# Patient Record
Sex: MALE | Race: WHITE | NOT HISPANIC OR LATINO | URBAN - METROPOLITAN AREA
[De-identification: names, ages, dates, MRNs, and addresses within clinical notes are randomized per-mention and may not be internally consistent; named-entity substitution may affect disease eponyms.]

---

## 2017-04-07 ENCOUNTER — OUTPATIENT (OUTPATIENT)
Dept: OUTPATIENT SERVICES | Facility: HOSPITAL | Age: 77
LOS: 1 days | Discharge: HOME | End: 2017-04-07

## 2017-06-27 DIAGNOSIS — R79.9 ABNORMAL FINDING OF BLOOD CHEMISTRY, UNSPECIFIED: ICD-10-CM

## 2017-07-31 ENCOUNTER — OUTPATIENT (OUTPATIENT)
Dept: OUTPATIENT SERVICES | Facility: HOSPITAL | Age: 77
LOS: 1 days | Discharge: HOME | End: 2017-07-31

## 2017-07-31 DIAGNOSIS — G81.10 SPASTIC HEMIPLEGIA AFFECTING UNSPECIFIED SIDE: ICD-10-CM

## 2017-07-31 DIAGNOSIS — I69.320 APHASIA FOLLOWING CEREBRAL INFARCTION: ICD-10-CM

## 2017-07-31 DIAGNOSIS — I69.390 APRAXIA FOLLOWING CEREBRAL INFARCTION: ICD-10-CM

## 2017-08-02 ENCOUNTER — INPATIENT (INPATIENT)
Facility: HOSPITAL | Age: 77
LOS: 7 days | Discharge: NURSING, REHAB OR LONG-TERM CARE | End: 2017-08-10
Attending: HOSPITALIST | Admitting: FAMILY MEDICINE

## 2017-08-02 DIAGNOSIS — J18.9 PNEUMONIA, UNSPECIFIED ORGANISM: ICD-10-CM

## 2017-08-10 PROBLEM — Z00.00 ENCOUNTER FOR PREVENTIVE HEALTH EXAMINATION: Status: ACTIVE | Noted: 2017-08-10

## 2017-08-15 DIAGNOSIS — J69.0 PNEUMONITIS DUE TO INHALATION OF FOOD AND VOMIT: ICD-10-CM

## 2017-08-15 DIAGNOSIS — E78.5 HYPERLIPIDEMIA, UNSPECIFIED: ICD-10-CM

## 2017-08-15 DIAGNOSIS — R06.02 SHORTNESS OF BREATH: ICD-10-CM

## 2017-08-15 DIAGNOSIS — N40.0 BENIGN PROSTATIC HYPERPLASIA WITHOUT LOWER URINARY TRACT SYMPTOMS: ICD-10-CM

## 2017-08-15 DIAGNOSIS — I69.320 APHASIA FOLLOWING CEREBRAL INFARCTION: ICD-10-CM

## 2017-08-15 DIAGNOSIS — I12.9 HYPERTENSIVE CHRONIC KIDNEY DISEASE WITH STAGE 1 THROUGH STAGE 4 CHRONIC KIDNEY DISEASE, OR UNSPECIFIED CHRONIC KIDNEY DISEASE: ICD-10-CM

## 2017-08-15 DIAGNOSIS — E78.00 PURE HYPERCHOLESTEROLEMIA, UNSPECIFIED: ICD-10-CM

## 2017-08-15 DIAGNOSIS — I70.223 ATHEROSCLEROSIS OF NATIVE ARTERIES OF EXTREMITIES WITH REST PAIN, BILATERAL LEGS: ICD-10-CM

## 2017-08-15 DIAGNOSIS — R31.9 HEMATURIA, UNSPECIFIED: ICD-10-CM

## 2017-08-15 DIAGNOSIS — I69.351 HEMIPLEGIA AND HEMIPARESIS FOLLOWING CEREBRAL INFARCTION AFFECTING RIGHT DOMINANT SIDE: ICD-10-CM

## 2017-08-15 DIAGNOSIS — N18.2 CHRONIC KIDNEY DISEASE, STAGE 2 (MILD): ICD-10-CM

## 2017-08-15 DIAGNOSIS — Z87.891 PERSONAL HISTORY OF NICOTINE DEPENDENCE: ICD-10-CM

## 2017-08-15 DIAGNOSIS — M54.89 OTHER DORSALGIA: ICD-10-CM

## 2017-08-15 DIAGNOSIS — N17.9 ACUTE KIDNEY FAILURE, UNSPECIFIED: ICD-10-CM

## 2017-08-16 DIAGNOSIS — Z78.1 PHYSICAL RESTRAINT STATUS: ICD-10-CM

## 2017-08-22 ENCOUNTER — OUTPATIENT (OUTPATIENT)
Dept: OUTPATIENT SERVICES | Facility: HOSPITAL | Age: 77
LOS: 1 days | Discharge: HOME | End: 2017-08-22

## 2017-08-22 DIAGNOSIS — I10 ESSENTIAL (PRIMARY) HYPERTENSION: ICD-10-CM

## 2017-08-22 DIAGNOSIS — J18.9 PNEUMONIA, UNSPECIFIED ORGANISM: ICD-10-CM

## 2017-08-23 ENCOUNTER — APPOINTMENT (OUTPATIENT)
Dept: VASCULAR SURGERY | Facility: CLINIC | Age: 77
End: 2017-08-23

## 2017-12-10 ENCOUNTER — EMERGENCY (EMERGENCY)
Facility: HOSPITAL | Age: 77
LOS: 0 days | Discharge: HOME | End: 2017-12-10

## 2017-12-10 DIAGNOSIS — R21 RASH AND OTHER NONSPECIFIC SKIN ERUPTION: ICD-10-CM

## 2017-12-10 DIAGNOSIS — Z79.01 LONG TERM (CURRENT) USE OF ANTICOAGULANTS: ICD-10-CM

## 2017-12-10 DIAGNOSIS — I10 ESSENTIAL (PRIMARY) HYPERTENSION: ICD-10-CM

## 2017-12-10 DIAGNOSIS — Z79.82 LONG TERM (CURRENT) USE OF ASPIRIN: ICD-10-CM

## 2017-12-10 DIAGNOSIS — J18.9 PNEUMONIA, UNSPECIFIED ORGANISM: ICD-10-CM

## 2017-12-10 DIAGNOSIS — L22 DIAPER DERMATITIS: ICD-10-CM

## 2017-12-10 DIAGNOSIS — E78.00 PURE HYPERCHOLESTEROLEMIA, UNSPECIFIED: ICD-10-CM

## 2017-12-10 DIAGNOSIS — Z88.0 ALLERGY STATUS TO PENICILLIN: ICD-10-CM

## 2017-12-10 DIAGNOSIS — E78.5 HYPERLIPIDEMIA, UNSPECIFIED: ICD-10-CM

## 2017-12-10 DIAGNOSIS — Z79.899 OTHER LONG TERM (CURRENT) DRUG THERAPY: ICD-10-CM

## 2018-01-03 ENCOUNTER — OUTPATIENT (OUTPATIENT)
Dept: OUTPATIENT SERVICES | Facility: HOSPITAL | Age: 78
LOS: 1 days | Discharge: HOME | End: 2018-01-03

## 2018-01-03 DIAGNOSIS — I69.020 APHASIA FOLLOWING NONTRAUMATIC SUBARACHNOID HEMORRHAGE: ICD-10-CM

## 2018-01-03 DIAGNOSIS — I69.320 APHASIA FOLLOWING CEREBRAL INFARCTION: ICD-10-CM

## 2018-01-03 DIAGNOSIS — G81.90 HEMIPLEGIA, UNSPECIFIED AFFECTING UNSPECIFIED SIDE: ICD-10-CM

## 2018-01-05 ENCOUNTER — INPATIENT (INPATIENT)
Facility: HOSPITAL | Age: 78
LOS: 6 days | Discharge: HOME HEALTH | End: 2018-01-12
Attending: HOSPITALIST | Admitting: FAMILY MEDICINE

## 2018-01-05 DIAGNOSIS — I10 ESSENTIAL (PRIMARY) HYPERTENSION: ICD-10-CM

## 2018-01-05 DIAGNOSIS — J18.9 PNEUMONIA, UNSPECIFIED ORGANISM: ICD-10-CM

## 2018-01-05 DIAGNOSIS — R21 RASH AND OTHER NONSPECIFIC SKIN ERUPTION: ICD-10-CM

## 2018-01-17 DIAGNOSIS — I50.33 ACUTE ON CHRONIC DIASTOLIC (CONGESTIVE) HEART FAILURE: ICD-10-CM

## 2018-01-17 DIAGNOSIS — R13.10 DYSPHAGIA, UNSPECIFIED: ICD-10-CM

## 2018-01-17 DIAGNOSIS — R21 RASH AND OTHER NONSPECIFIC SKIN ERUPTION: ICD-10-CM

## 2018-01-17 DIAGNOSIS — Z79.82 LONG TERM (CURRENT) USE OF ASPIRIN: ICD-10-CM

## 2018-01-17 DIAGNOSIS — I07.1 RHEUMATIC TRICUSPID INSUFFICIENCY: ICD-10-CM

## 2018-01-17 DIAGNOSIS — Z88.0 ALLERGY STATUS TO PENICILLIN: ICD-10-CM

## 2018-01-17 DIAGNOSIS — I11.0 HYPERTENSIVE HEART DISEASE WITH HEART FAILURE: ICD-10-CM

## 2018-01-17 DIAGNOSIS — Z79.01 LONG TERM (CURRENT) USE OF ANTICOAGULANTS: ICD-10-CM

## 2018-01-17 DIAGNOSIS — E86.0 DEHYDRATION: ICD-10-CM

## 2018-01-17 DIAGNOSIS — M19.90 UNSPECIFIED OSTEOARTHRITIS, UNSPECIFIED SITE: ICD-10-CM

## 2018-01-17 DIAGNOSIS — N40.0 BENIGN PROSTATIC HYPERPLASIA WITHOUT LOWER URINARY TRACT SYMPTOMS: ICD-10-CM

## 2018-01-17 DIAGNOSIS — K59.00 CONSTIPATION, UNSPECIFIED: ICD-10-CM

## 2018-01-17 DIAGNOSIS — L40.9 PSORIASIS, UNSPECIFIED: ICD-10-CM

## 2018-01-17 DIAGNOSIS — E78.5 HYPERLIPIDEMIA, UNSPECIFIED: ICD-10-CM

## 2018-01-17 DIAGNOSIS — Z74.01 BED CONFINEMENT STATUS: ICD-10-CM

## 2018-01-17 DIAGNOSIS — I73.9 PERIPHERAL VASCULAR DISEASE, UNSPECIFIED: ICD-10-CM

## 2018-01-17 DIAGNOSIS — R00.0 TACHYCARDIA, UNSPECIFIED: ICD-10-CM

## 2018-01-17 DIAGNOSIS — D72.829 ELEVATED WHITE BLOOD CELL COUNT, UNSPECIFIED: ICD-10-CM

## 2018-01-17 DIAGNOSIS — Z87.891 PERSONAL HISTORY OF NICOTINE DEPENDENCE: ICD-10-CM

## 2018-01-17 DIAGNOSIS — J96.01 ACUTE RESPIRATORY FAILURE WITH HYPOXIA: ICD-10-CM

## 2018-01-17 DIAGNOSIS — I69.351 HEMIPLEGIA AND HEMIPARESIS FOLLOWING CEREBRAL INFARCTION AFFECTING RIGHT DOMINANT SIDE: ICD-10-CM

## 2018-01-17 DIAGNOSIS — R50.9 FEVER, UNSPECIFIED: ICD-10-CM

## 2018-01-17 DIAGNOSIS — J81.1 CHRONIC PULMONARY EDEMA: ICD-10-CM

## 2018-01-31 ENCOUNTER — OUTPATIENT (OUTPATIENT)
Dept: OUTPATIENT SERVICES | Facility: HOSPITAL | Age: 78
LOS: 1 days | Discharge: HOME | End: 2018-01-31

## 2018-01-31 DIAGNOSIS — G81.90 HEMIPLEGIA, UNSPECIFIED AFFECTING UNSPECIFIED SIDE: ICD-10-CM

## 2018-01-31 DIAGNOSIS — I69.320 APHASIA FOLLOWING CEREBRAL INFARCTION: ICD-10-CM

## 2018-01-31 DIAGNOSIS — I69.020 APHASIA FOLLOWING NONTRAUMATIC SUBARACHNOID HEMORRHAGE: ICD-10-CM

## 2018-02-04 DIAGNOSIS — J18.9 PNEUMONIA, UNSPECIFIED ORGANISM: ICD-10-CM

## 2018-02-04 DIAGNOSIS — I10 ESSENTIAL (PRIMARY) HYPERTENSION: ICD-10-CM

## 2018-02-04 DIAGNOSIS — R21 RASH AND OTHER NONSPECIFIC SKIN ERUPTION: ICD-10-CM

## 2018-03-31 ENCOUNTER — INPATIENT (INPATIENT)
Facility: HOSPITAL | Age: 78
LOS: 4 days | Discharge: HOME | End: 2018-04-05
Attending: INTERNAL MEDICINE

## 2018-03-31 VITALS
HEIGHT: 66 IN | RESPIRATION RATE: 20 BRPM | DIASTOLIC BLOOD PRESSURE: 100 MMHG | SYSTOLIC BLOOD PRESSURE: 180 MMHG | HEART RATE: 90 BPM | WEIGHT: 169.98 LBS

## 2018-03-31 DIAGNOSIS — E78.00 PURE HYPERCHOLESTEROLEMIA, UNSPECIFIED: ICD-10-CM

## 2018-03-31 DIAGNOSIS — I10 ESSENTIAL (PRIMARY) HYPERTENSION: ICD-10-CM

## 2018-03-31 DIAGNOSIS — R56.9 UNSPECIFIED CONVULSIONS: ICD-10-CM

## 2018-03-31 DIAGNOSIS — N40.0 BENIGN PROSTATIC HYPERPLASIA WITHOUT LOWER URINARY TRACT SYMPTOMS: ICD-10-CM

## 2018-03-31 LAB
ALBUMIN SERPL ELPH-MCNC: 4.1 G/DL — SIGNIFICANT CHANGE UP (ref 3.5–5.2)
ALP SERPL-CCNC: 85 U/L — SIGNIFICANT CHANGE UP (ref 30–115)
ALT FLD-CCNC: 30 U/L — SIGNIFICANT CHANGE UP (ref 0–41)
ANION GAP SERPL CALC-SCNC: 19 MMOL/L — HIGH (ref 7–14)
APTT BLD: 31.4 SEC — SIGNIFICANT CHANGE UP (ref 27–39.2)
AST SERPL-CCNC: 13 U/L — SIGNIFICANT CHANGE UP (ref 0–41)
BASE EXCESS BLDV CALC-SCNC: 0.4 MMOL/L — SIGNIFICANT CHANGE UP (ref -2–2)
BASOPHILS # BLD AUTO: 0.02 K/UL — SIGNIFICANT CHANGE UP (ref 0–0.2)
BASOPHILS NFR BLD AUTO: 0.1 % — SIGNIFICANT CHANGE UP (ref 0–1)
BILIRUB SERPL-MCNC: 0.7 MG/DL — SIGNIFICANT CHANGE UP (ref 0.2–1.2)
BUN SERPL-MCNC: 39 MG/DL — HIGH (ref 10–20)
CA-I SERPL-SCNC: 1.24 MMOL/L — SIGNIFICANT CHANGE UP (ref 1.12–1.3)
CALCIUM SERPL-MCNC: 9.7 MG/DL — SIGNIFICANT CHANGE UP (ref 8.5–10.1)
CHLORIDE SERPL-SCNC: 99 MMOL/L — SIGNIFICANT CHANGE UP (ref 98–110)
CHOLEST SERPL-MCNC: 160 MG/DL — SIGNIFICANT CHANGE UP (ref 100–200)
CK MB BLD-MCNC: 6 % — HIGH (ref 0–4)
CK MB CFR SERPL CALC: 2.1 NG/ML — SIGNIFICANT CHANGE UP (ref 0.6–6.3)
CK SERPL-CCNC: 38 U/L — SIGNIFICANT CHANGE UP (ref 0–225)
CO2 SERPL-SCNC: 22 MMOL/L — SIGNIFICANT CHANGE UP (ref 17–32)
CREAT SERPL-MCNC: 1.3 MG/DL — SIGNIFICANT CHANGE UP (ref 0.7–1.5)
EOSINOPHIL # BLD AUTO: 0.08 K/UL — SIGNIFICANT CHANGE UP (ref 0–0.7)
EOSINOPHIL NFR BLD AUTO: 0.6 % — SIGNIFICANT CHANGE UP (ref 0–8)
GAS PNL BLDV: 136 MMOL/L — SIGNIFICANT CHANGE UP (ref 136–145)
GAS PNL BLDV: SIGNIFICANT CHANGE UP
GLUCOSE SERPL-MCNC: 144 MG/DL — HIGH (ref 70–99)
HCO3 BLDV-SCNC: 27 MMOL/L — SIGNIFICANT CHANGE UP (ref 22–29)
HCT VFR BLD CALC: 40.7 % — LOW (ref 42–52)
HCT VFR BLD CALC: 45.5 % — SIGNIFICANT CHANGE UP (ref 42–52)
HCT VFR BLDA CALC: 51.9 % — HIGH (ref 34–44)
HGB BLD CALC-MCNC: 16.9 G/DL — SIGNIFICANT CHANGE UP (ref 14–18)
HGB BLD-MCNC: 14 G/DL — SIGNIFICANT CHANGE UP (ref 14–18)
HGB BLD-MCNC: 15.4 G/DL — SIGNIFICANT CHANGE UP (ref 14–18)
HOROWITZ INDEX BLDV+IHG-RTO: 21 — SIGNIFICANT CHANGE UP
IMM GRANULOCYTES NFR BLD AUTO: 1 % — HIGH (ref 0.1–0.3)
INR BLD: 1.03 RATIO — SIGNIFICANT CHANGE UP (ref 0.65–1.3)
LACTATE BLDV-MCNC: 3.5 MMOL/L — HIGH (ref 0.5–1.6)
LYMPHOCYTES # BLD AUTO: 1.61 K/UL — SIGNIFICANT CHANGE UP (ref 1.2–3.4)
LYMPHOCYTES # BLD AUTO: 12.1 % — LOW (ref 20.5–51.1)
MAGNESIUM SERPL-MCNC: 1.9 MG/DL — SIGNIFICANT CHANGE UP (ref 1.8–2.4)
MCHC RBC-ENTMCNC: 30.4 PG — SIGNIFICANT CHANGE UP (ref 27–31)
MCHC RBC-ENTMCNC: 30.6 PG — SIGNIFICANT CHANGE UP (ref 27–31)
MCHC RBC-ENTMCNC: 33.8 G/DL — SIGNIFICANT CHANGE UP (ref 32–37)
MCHC RBC-ENTMCNC: 34.4 G/DL — SIGNIFICANT CHANGE UP (ref 32–37)
MCV RBC AUTO: 88.9 FL — SIGNIFICANT CHANGE UP (ref 80–94)
MCV RBC AUTO: 89.7 FL — SIGNIFICANT CHANGE UP (ref 80–94)
MONOCYTES # BLD AUTO: 0.62 K/UL — HIGH (ref 0.1–0.6)
MONOCYTES NFR BLD AUTO: 4.6 % — SIGNIFICANT CHANGE UP (ref 1.7–9.3)
NEUTROPHILS # BLD AUTO: 10.9 K/UL — HIGH (ref 1.4–6.5)
NEUTROPHILS NFR BLD AUTO: 81.6 % — HIGH (ref 42.2–75.2)
NRBC # BLD: 0 /100 WBCS — SIGNIFICANT CHANGE UP (ref 0–0)
NRBC # BLD: 0 /100 WBCS — SIGNIFICANT CHANGE UP (ref 0–0)
PCO2 BLDV: 47 MMHG — SIGNIFICANT CHANGE UP (ref 41–51)
PH BLDV: 7.36 — SIGNIFICANT CHANGE UP (ref 7.26–7.43)
PLATELET # BLD AUTO: 310 K/UL — SIGNIFICANT CHANGE UP (ref 130–400)
PLATELET # BLD AUTO: 395 K/UL — SIGNIFICANT CHANGE UP (ref 130–400)
PO2 BLDV: 43 MMHG — HIGH (ref 20–40)
POTASSIUM BLDV-SCNC: 4.4 MMOL/L — SIGNIFICANT CHANGE UP (ref 3.3–5.6)
POTASSIUM SERPL-MCNC: 5.1 MMOL/L — HIGH (ref 3.5–5)
POTASSIUM SERPL-SCNC: 5.1 MMOL/L — HIGH (ref 3.5–5)
PROT SERPL-MCNC: 6.3 G/DL — SIGNIFICANT CHANGE UP (ref 6–8)
PROTHROM AB SERPL-ACNC: 11.1 SEC — SIGNIFICANT CHANGE UP (ref 9.95–12.87)
RBC # BLD: 4.58 M/UL — LOW (ref 4.7–6.1)
RBC # BLD: 5.07 M/UL — SIGNIFICANT CHANGE UP (ref 4.7–6.1)
RBC # FLD: 14.3 % — SIGNIFICANT CHANGE UP (ref 11.5–14.5)
RBC # FLD: 14.4 % — SIGNIFICANT CHANGE UP (ref 11.5–14.5)
SAO2 % BLDV: 77 % — SIGNIFICANT CHANGE UP
SODIUM SERPL-SCNC: 140 MMOL/L — SIGNIFICANT CHANGE UP (ref 135–146)
TROPONIN T SERPL-MCNC: <0.01 NG/ML — SIGNIFICANT CHANGE UP
WBC # BLD: 13.36 K/UL — HIGH (ref 4.8–10.8)
WBC # BLD: 16.93 K/UL — HIGH (ref 4.8–10.8)
WBC # FLD AUTO: 13.36 K/UL — HIGH (ref 4.8–10.8)
WBC # FLD AUTO: 16.93 K/UL — HIGH (ref 4.8–10.8)

## 2018-03-31 RX ORDER — LEVETIRACETAM 250 MG/1
500 TABLET, FILM COATED ORAL EVERY 12 HOURS
Qty: 0 | Refills: 0 | Status: DISCONTINUED | OUTPATIENT
Start: 2018-03-31 | End: 2018-04-02

## 2018-03-31 RX ORDER — CLOPIDOGREL BISULFATE 75 MG/1
75 TABLET, FILM COATED ORAL DAILY
Qty: 0 | Refills: 0 | Status: DISCONTINUED | OUTPATIENT
Start: 2018-03-31 | End: 2018-04-01

## 2018-03-31 RX ORDER — TRAMADOL HYDROCHLORIDE 50 MG/1
25 TABLET ORAL
Qty: 0 | Refills: 0 | Status: DISCONTINUED | OUTPATIENT
Start: 2018-03-31 | End: 2018-04-05

## 2018-03-31 RX ORDER — SODIUM CHLORIDE 9 MG/ML
1000 INJECTION INTRAMUSCULAR; INTRAVENOUS; SUBCUTANEOUS
Qty: 0 | Refills: 0 | Status: DISCONTINUED | OUTPATIENT
Start: 2018-03-31 | End: 2018-04-05

## 2018-03-31 RX ORDER — BACLOFEN 100 %
10 POWDER (GRAM) MISCELLANEOUS DAILY
Qty: 0 | Refills: 0 | Status: DISCONTINUED | OUTPATIENT
Start: 2018-03-31 | End: 2018-04-01

## 2018-03-31 RX ORDER — ATORVASTATIN CALCIUM 80 MG/1
40 TABLET, FILM COATED ORAL AT BEDTIME
Qty: 0 | Refills: 0 | Status: DISCONTINUED | OUTPATIENT
Start: 2018-03-31 | End: 2018-04-05

## 2018-03-31 RX ORDER — AMLODIPINE BESYLATE 2.5 MG/1
10 TABLET ORAL ONCE
Qty: 0 | Refills: 0 | Status: COMPLETED | OUTPATIENT
Start: 2018-03-31 | End: 2018-03-31

## 2018-03-31 RX ORDER — LOSARTAN POTASSIUM 100 MG/1
100 TABLET, FILM COATED ORAL ONCE
Qty: 0 | Refills: 0 | Status: COMPLETED | OUTPATIENT
Start: 2018-03-31 | End: 2018-03-31

## 2018-03-31 RX ORDER — HYDROCHLOROTHIAZIDE 25 MG
25 TABLET ORAL DAILY
Qty: 0 | Refills: 0 | Status: DISCONTINUED | OUTPATIENT
Start: 2018-03-31 | End: 2018-04-05

## 2018-03-31 RX ORDER — ASPIRIN/CALCIUM CARB/MAGNESIUM 324 MG
81 TABLET ORAL ONCE
Qty: 0 | Refills: 0 | Status: COMPLETED | OUTPATIENT
Start: 2018-03-31 | End: 2018-03-31

## 2018-03-31 RX ORDER — ENOXAPARIN SODIUM 100 MG/ML
40 INJECTION SUBCUTANEOUS AT BEDTIME
Qty: 0 | Refills: 0 | Status: DISCONTINUED | OUTPATIENT
Start: 2018-03-31 | End: 2018-04-01

## 2018-03-31 RX ORDER — FINASTERIDE 5 MG/1
5 TABLET, FILM COATED ORAL DAILY
Qty: 0 | Refills: 0 | Status: DISCONTINUED | OUTPATIENT
Start: 2018-03-31 | End: 2018-04-05

## 2018-03-31 RX ORDER — LEVETIRACETAM 250 MG/1
1000 TABLET, FILM COATED ORAL ONCE
Qty: 0 | Refills: 0 | Status: DISCONTINUED | OUTPATIENT
Start: 2018-03-31 | End: 2018-03-31

## 2018-03-31 RX ORDER — LOSARTAN POTASSIUM 100 MG/1
100 TABLET, FILM COATED ORAL DAILY
Qty: 0 | Refills: 0 | Status: DISCONTINUED | OUTPATIENT
Start: 2018-03-31 | End: 2018-04-05

## 2018-03-31 RX ORDER — HYDROCHLOROTHIAZIDE 25 MG
12.5 TABLET ORAL ONCE
Qty: 0 | Refills: 0 | Status: COMPLETED | OUTPATIENT
Start: 2018-03-31 | End: 2018-03-31

## 2018-03-31 RX ORDER — AMLODIPINE BESYLATE 2.5 MG/1
10 TABLET ORAL DAILY
Qty: 0 | Refills: 0 | Status: DISCONTINUED | OUTPATIENT
Start: 2018-03-31 | End: 2018-04-05

## 2018-03-31 RX ORDER — ASPIRIN/CALCIUM CARB/MAGNESIUM 324 MG
81 TABLET ORAL DAILY
Qty: 0 | Refills: 0 | Status: DISCONTINUED | OUTPATIENT
Start: 2018-03-31 | End: 2018-04-05

## 2018-03-31 RX ADMIN — CLOPIDOGREL BISULFATE 75 MILLIGRAM(S): 75 TABLET, FILM COATED ORAL at 08:41

## 2018-03-31 RX ADMIN — AMLODIPINE BESYLATE 10 MILLIGRAM(S): 2.5 TABLET ORAL at 10:33

## 2018-03-31 RX ADMIN — Medication 1 MILLIGRAM(S): at 07:11

## 2018-03-31 RX ADMIN — Medication 10 MILLIGRAM(S): at 21:20

## 2018-03-31 RX ADMIN — ATORVASTATIN CALCIUM 40 MILLIGRAM(S): 80 TABLET, FILM COATED ORAL at 21:20

## 2018-03-31 RX ADMIN — LOSARTAN POTASSIUM 100 MILLIGRAM(S): 100 TABLET, FILM COATED ORAL at 10:33

## 2018-03-31 RX ADMIN — SODIUM CHLORIDE 50 MILLILITER(S): 9 INJECTION INTRAMUSCULAR; INTRAVENOUS; SUBCUTANEOUS at 19:32

## 2018-03-31 RX ADMIN — Medication 81 MILLIGRAM(S): at 08:41

## 2018-03-31 RX ADMIN — TRAMADOL HYDROCHLORIDE 25 MILLIGRAM(S): 50 TABLET ORAL at 17:19

## 2018-03-31 RX ADMIN — Medication 12.5 MILLIGRAM(S): at 10:33

## 2018-03-31 RX ADMIN — Medication 120 MILLIGRAM(S): at 08:41

## 2018-03-31 NOTE — H&P ADULT - ASSESSMENT
78y male presented from home with constant tremors x 1 day releived  with ativan , history signfiicant for cva 	  eeg, neuro eval for additional studies     dnr / dni

## 2018-03-31 NOTE — H&P ADULT - PROBLEM SELECTOR PROBLEM 1
ACUTE RENAL FAILURE: due to urosepsis , uti continue with fluid and iv abx   Serum creatinine is increased    , approximating GFR is decreased    ml/min.   [There is  progression]. [No uremic symptoms]   [No evidence of anemia].  Fluid status stable.  Will continue to avoid nephrotoxic drugs.  Patient remains asymptomatic.   Continue current therapy.  hold   diuretic.  hold   ACE inhibitor.  hold   ARB. Seizure

## 2018-03-31 NOTE — STROKE CODE NOTE - NIH STROKE SCALE: 8. SENSORY, QM
(2) Severe to total sensory loss; patient is not aware of being touched in the face, arm, and leg (1) Mild-to-moderate sensory loss; patient feels pinprick is less sharp or is dull on the affected side; or there is a loss of superficial pain with pinprick, but patient is aware of being touched

## 2018-03-31 NOTE — STROKE CODE NOTE - NIH STROKE SCALE: 4. FACIAL PALSY, QM
(3) Complete paralysis of one or both sides (absence of facial movement in the upper and lower face) (1) Minor paralysis (flattened nasolabial fold, asymmetry on smiling)

## 2018-03-31 NOTE — ED PROVIDER NOTE - PROGRESS NOTE DETAILS
case dw Dr Mijares, pts prior hx precludes tpa or intervention at this time. pt to be admitted for further eval. CT pending. Pt present to the ED with Right sided facial droop and twitching. Stroke code was called. Hx of CNS bleed about one year ago. Pt has chronic right sided weakness. Care taker is wife. New symptoms are right facial weakness. This happen about 3-4 weeks ago per the daughter. CTscan is showing subacute cva changes. Of not pt has a right facial twitch and right body twitching. He is awake and answer question appropriately with a not. Smiles. Turns head. Pt is cooperative. Was given Ativan 1 mg earlier. List of meds, losartan 100 mg qd, asa 81 mg qd, baclofen 10 mg qd, Norvasc 10 mg qd, Finesteride 5 mg qd, Lipitor 40 mg qd, Plavix 75 mg qd, HCTZ 25 mg qd, prednisone in January. On exam S1S2 rrr, lungs clear, abdomen is soft nontender. Diaper.  Twitching may be seizures on the right side-partial as pt is awake, will give Keppra. Admission pending. Swallowing test >>if ok give daily meds. Wife states pt had keppra in the past and had a reaction. Will change to dilantin. Pt still with intermittent twitching. Pt is awake and alert and responds to commands. Blood pressure elevated. Will give his morning medication. Dilantin is infusing at this time. Spoke to Dr. Castañeda advised of admission>> stroke tele floor. Decrease in twitching. Pt is awake and alert. Will discuss with neuro.

## 2018-03-31 NOTE — ED PROVIDER NOTE - OBJECTIVE STATEMENT
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 As per wife pt was last seen without facial droop and tremors at 5am. No head injury or trauma. No fever or chills. No difficulty breathing.

## 2018-03-31 NOTE — CONSULT NOTE ADULT - SUBJECTIVE AND OBJECTIVE BOX
Neurology Consult    Patient is a 78y old  Male who presents with a chief complaint of Right side tremors (31 Mar 2018 14:42)    HPI:  79 yo M with pmhx CVA in 2/2018 with residual right hemiplegia and aphasia hemorrhagic stroke as per family presenting after family noted what looked like a right facial droop along with tremors to right upper extremity and right lower extremity. As per wife pt was last seen without facial droop and tremors at 5am. No head injury or trauma. No fever or chills. No difficulty breathing. Tremor was constant of right arm and leg , without relieving or aggravating factors, associted with right facial droop ,  	  baseline bedbound, and constant right sided 0/5  since cva (31 Mar 2018 12:56)    PAST MEDICAL & SURGICAL HISTORY:  Right sided weakness  High cholesterol  Enlarged prostate  HTN (hypertension)  Hemorrhagic stroke    FAMILY HISTORY:    Social History: (-) x 3    Allergies    penicillins (Unknown)    Intolerances    MEDICATIONS  (STANDING):  amLODIPine   Tablet 10 milliGRAM(s) Oral daily  aspirin  chewable 81 milliGRAM(s) Oral daily  atorvastatin 40 milliGRAM(s) Oral at bedtime  baclofen 10 milliGRAM(s) Oral daily  clopidogrel Tablet 75 milliGRAM(s) Oral daily  enoxaparin Injectable 40 milliGRAM(s) SubCutaneous at bedtime  finasteride 5 milliGRAM(s) Oral daily  hydrochlorothiazide 25 milliGRAM(s) Oral daily  losartan 100 milliGRAM(s) Oral daily    MEDICATIONS  (PRN):      Review of systems:    Constitutional: No fever, weight loss or fatigue    Eyes: No eye pain or discharge  ENMT:  No difficulty hearing; No sinus or throat pain  Neck: No pain or stiffness  Respiratory: No cough, wheezing, chills or hemoptysis  Cardiovascular: No chest pain, palpitations, shortness of breath, dyspnea on exertion  Gastrointestinal: No abdominal pain, nausea, vomiting or hematemesis; No diarrhea or constipation.   Genitourinary: No dysuria, frequency, hematuria or incontinence  Neurological: As per HPI  Skin: No rashes or lesions   Endocrine: No heat or cold intolerance; No hair loss  Musculoskeletal: No joint pain or swelling  Psychiatric: No depression, anxiety, mood swings  Heme/Lymph: No easy bruising or bleeding gums    Vital Signs Last 24 Hrs  T(C): 35.8 (31 Mar 2018 14:27), Max: 36.7 (31 Mar 2018 07:06)  T(F): 96.4 (31 Mar 2018 14:27), Max: 98.1 (31 Mar 2018 07:06)  HR: 74 (31 Mar 2018 14:27) (70 - 98)  BP: 137/55 (31 Mar 2018 14:27) (106/62 - 180/100)  BP(mean): --  RR: 16 (31 Mar 2018 14:27) (16 - 20)  SpO2: 96% (31 Mar 2018 10:48) (96% - 98%)    Neurologic Examination:  General:  Appearance is consistent with chronologic age.  No abnormal facies.   General: The patient is oriented to person, place, time and date.  Recent and remote memory intact.  Fund of knowledge is intact and normal.  Language with normal repetition, comprehension and naming.  Nondysarthric.    Cranial nerves: intact VA, VFF.  EOMI w/o nystagmus, skew or reported double vision.  PERRL.  No ptosis/weakness of eyelid closure.  Facial sensation is normal with normal bite.  No facial asymmetry.  Hearing grossly intact b/l.  Palate elevates midline.  Tongue midline.  Motor examination:   Normal tone, bulk and range of motion.  No tenderness, twitching, tremors or involuntary movements.  Formal Muscle Strength Testing: (MRC grade R/L) 5/5 UE; 5/5 LE.  No observable drift.  Reflexes:   2+ b/l pectoralis, biceps, triceps, brachioradialis, patella and Achilles.  Plantar response downgoing b/l.  Jaw jerk, Elizabeth, clonus absent.  Sensory examination:   Intact to light touch and pinprick, pain, temperature and proprioception and vibration in all extremities.  Cerebellum:   FTN/HKS intact with normal VIKAS in all limbs.  No dysmetria or dysdiadokinesia.  Gait narrow based and normal.    Labs:   CBC Full  -  ( 31 Mar 2018 07:10 )  WBC Count : 13.36 K/uL  Hemoglobin : 15.4 g/dL  Hematocrit : 45.5 %  Platelet Count - Automated : 395 K/uL  Mean Cell Volume : 89.7 fL  Mean Cell Hemoglobin : 30.4 pg  Mean Cell Hemoglobin Concentration : 33.8 g/dL  Auto Neutrophil # : 10.90 K/uL  Auto Lymphocyte # : 1.61 K/uL  Auto Monocyte # : 0.62 K/uL  Auto Eosinophil # : 0.08 K/uL  Auto Basophil # : 0.02 K/uL  Auto Neutrophil % : 81.6 %  Auto Lymphocyte % : 12.1 %  Auto Monocyte % : 4.6 %  Auto Eosinophil % : 0.6 %  Auto Basophil % : 0.1 %    03-31    140  |  99  |  39<H>  ----------------------------<  144<H>  5.1<H>   |  22  |  1.3    Ca    9.7      31 Mar 2018 07:10  Mg     1.9     03-31    TPro  6.3  /  Alb  4.1  /  TBili  0.7  /  DBili  x   /  AST  13  /  ALT  30  /  AlkPhos  85  03-31    LIVER FUNCTIONS - ( 31 Mar 2018 07:10 )  Alb: 4.1 g/dL / Pro: 6.3 g/dL / ALK PHOS: 85 U/L / ALT: 30 U/L / AST: 13 U/L / GGT: x           PT/INR - ( 31 Mar 2018 07:10 )   PT: 11.10 sec;   INR: 1.03 ratio         PTT - ( 31 Mar 2018 07:10 )  PTT:31.4 sec    Neuroimaging:  EXAM:  CT BRAIN        PROCEDURE DATE:  03/31/2018    INTERPRETATION:  CLINICAL HISTORY/REASON FOR EXAM: Stroke code.    TECHNIQUE: Contiguous axial CT images of the head were obtained from the   base of the skull to the vertex without IV stroke contrast.    COMPARISON: 2/9/2017. MRI brain 2/6/2017    FINDINGS: Study limited by motion artifact.     Compared to the study from one year prior, there is notable progression   of cerebral atrophy, particularly of the left cerebral convexity, with   extensive white matter tract disease consistent with the widespread   multifocal infarcts seenin February 2017.    There are small age-indeterminate strokes of the anterior and posterior   left frontal region, with no large territorial infarct.    Stable left occipital periventricular infarct.    No acute intracranial hemorrhage or midline shift or extra-axial fluid   collection    There is exvacuodilatation of the bilateral lateral ventricles.    Bones, paranasal sinuses, globes stable.    IMPRESSION:    Wedge-shaped age-indeterminate, likely subacute infarcts of the left   anterior and posterior frontal regions superimposed on severe cortical   and white matter atrophy secondary to left hemispheric multifocal   infarcts seen in February 2017. No acute intracranial    Further evaluation with MRI would be of benefit.    Dr. Joon Hessdiscussed preliminary findings with MCKENZIE MAYBERRY PA   on 3/31/2018 6:57 AM with readback.       (03-31-18 @ 06:38)        Assessment:  This is a 78y Male with h/o     Plan:   -   03-31-18 @ 16:26 Neurology Consult    Patient is a 78y old  Male who presents with a chief complaint of Right side tremors (31 Mar 2018 14:42)    WIFE NOTED SHAKING OF THE RUE SPREADING TO R FACE AND LEG LASTING HOURS FOLLOWED BY SPREAD TO CONTRALATERAL SIDE.  CURRENTLY BACK TO BASELINE AFTER ATIVAN.    HPI:  79 yo M with pmhx CVA in 2/2018 with residual right hemiplegia and aphasia hemorrhagic stroke as per family presenting after family noted what looked like a right facial droop along with tremors to right upper extremity and right lower extremity. As per wife pt was last seen without facial droop and tremors at 5am. No head injury or trauma. No fever or chills. No difficulty breathing. Tremor was constant of right arm and leg , without relieving or aggravating factors, associted with right facial droop ,  	  baseline bedbound, and constant right sided 0/5  since cva (31 Mar 2018 12:56)      PAST MEDICAL & SURGICAL HISTORY:  Right sided weakness  High cholesterol  Enlarged prostate  HTN (hypertension)  Hemorrhagic stroke    FAMILY HISTORY:    Social History: (-) x 3    Allergies    penicillins (Unknown)    Intolerances    MEDICATIONS  (STANDING):  amLODIPine   Tablet 10 milliGRAM(s) Oral daily  aspirin  chewable 81 milliGRAM(s) Oral daily  atorvastatin 40 milliGRAM(s) Oral at bedtime  baclofen 10 milliGRAM(s) Oral daily  clopidogrel Tablet 75 milliGRAM(s) Oral daily  enoxaparin Injectable 40 milliGRAM(s) SubCutaneous at bedtime  finasteride 5 milliGRAM(s) Oral daily  hydrochlorothiazide 25 milliGRAM(s) Oral daily  losartan 100 milliGRAM(s) Oral daily    MEDICATIONS  (PRN):      Review of systems:    Constitutional: No fever, weight loss or fatigue    Eyes: No eye pain or discharge  ENMT:  No difficulty hearing; No sinus or throat pain  Neck: No pain or stiffness  Respiratory: No cough, wheezing, chills or hemoptysis  Cardiovascular: No chest pain, palpitations, shortness of breath, dyspnea on exertion  Gastrointestinal: No abdominal pain, nausea, vomiting or hematemesis; No diarrhea or constipation.   Genitourinary: No dysuria, frequency, hematuria or incontinence  Neurological: As per HPI  Skin: No rashes or lesions   Endocrine: No heat or cold intolerance; No hair loss  Musculoskeletal: No joint pain or swelling  Psychiatric: No depression, anxiety, mood swings  Heme/Lymph: No easy bruising or bleeding gums    Vital Signs Last 24 Hrs  T(C): 35.8 (31 Mar 2018 14:27), Max: 36.7 (31 Mar 2018 07:06)  T(F): 96.4 (31 Mar 2018 14:27), Max: 98.1 (31 Mar 2018 07:06)  HR: 74 (31 Mar 2018 14:27) (70 - 98)  BP: 137/55 (31 Mar 2018 14:27) (106/62 - 180/100)  BP(mean): --  RR: 16 (31 Mar 2018 14:27) (16 - 20)  SpO2: 96% (31 Mar 2018 10:48) (96% - 98%)    Neurologic Examination:  General:  Appearance is consistent with chronologic age.  No abnormal facies.   General: single word sentences with preseveration.  follows commands.  severe aphasia. moderate dysarthria.  Cranial nerves: intact VA, VFF.  EOMI w/o nystagmus, skew or reported double vision.  PERRL.  No ptosis/weakness of eyelid closure.  Facial sensation is normal with normal bite.  R NLF.  Motor examination:   spastic R hemiplegia.  LUE/LLE 5/5  Reflexes:   3+/2+ b/l pectoralis, biceps, triceps, brachioradialis, patella and Achilles.  Plantar response upgoing b/l.  Jaw jerk, Elizabeth, clonus absent.  Sensory examination:   grossly intact  bedbound    Labs:   CBC Full  -  ( 31 Mar 2018 07:10 )  WBC Count : 13.36 K/uL  Hemoglobin : 15.4 g/dL  Hematocrit : 45.5 %  Platelet Count - Automated : 395 K/uL  Mean Cell Volume : 89.7 fL  Mean Cell Hemoglobin : 30.4 pg  Mean Cell Hemoglobin Concentration : 33.8 g/dL  Auto Neutrophil # : 10.90 K/uL  Auto Lymphocyte # : 1.61 K/uL  Auto Monocyte # : 0.62 K/uL  Auto Eosinophil # : 0.08 K/uL  Auto Basophil # : 0.02 K/uL  Auto Neutrophil % : 81.6 %  Auto Lymphocyte % : 12.1 %  Auto Monocyte % : 4.6 %  Auto Eosinophil % : 0.6 %  Auto Basophil % : 0.1 %    03-31    140  |  99  |  39<H>  ----------------------------<  144<H>  5.1<H>   |  22  |  1.3    Ca    9.7      31 Mar 2018 07:10  Mg     1.9     03-31    TPro  6.3  /  Alb  4.1  /  TBili  0.7  /  DBili  x   /  AST  13  /  ALT  30  /  AlkPhos  85  03-31    LIVER FUNCTIONS - ( 31 Mar 2018 07:10 )  Alb: 4.1 g/dL / Pro: 6.3 g/dL / ALK PHOS: 85 U/L / ALT: 30 U/L / AST: 13 U/L / GGT: x           PT/INR - ( 31 Mar 2018 07:10 )   PT: 11.10 sec;   INR: 1.03 ratio         PTT - ( 31 Mar 2018 07:10 )  PTT:31.4 sec    Neuroimaging:  EXAM:  CT BRAIN        PROCEDURE DATE:  03/31/2018    INTERPRETATION:  CLINICAL HISTORY/REASON FOR EXAM: Stroke code.    TECHNIQUE: Contiguous axial CT images of the head were obtained from the   base of the skull to the vertex without IV stroke contrast.    COMPARISON: 2/9/2017. MRI brain 2/6/2017    FINDINGS: Study limited by motion artifact.     Compared to the study from one year prior, there is notable progression   of cerebral atrophy, particularly of the left cerebral convexity, with   extensive white matter tract disease consistent with the widespread   multifocal infarcts seenin February 2017.  There are small age-indeterminate strokes of the anterior and posterior   left frontal region, with no large territorial infarct.  Stable left occipital periventricular infarct.  No acute intracranial hemorrhage or midline shift or extra-axial fluid   collection  There is exvacuodilatation of the bilateral lateral ventricles.  Bones, paranasal sinuses, globes stable.  IMPRESSION:  Wedge-shaped age-indeterminate, likely subacute infarcts of the left   anterior and posterior frontal regions superimposed on severe cortical   and white matter atrophy secondary to left hemispheric multifocal   infarcts seen in February 2017. No acute intracranial  Further evaluation with MRI would be of benefit.  Dr. Joon Hessdiscussed preliminary findings with MCKENZIE MAYBERRY PA   on 3/31/2018 6:57 AM with readback.  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  Doubt new stroke since pt back to baseline.     Plan:   - Start Keppra 500 mg IV/PO BID  - cont ASA/Plavix for now  - f/u REEG results  - seizure precautions  - if EEG (-) and pt baseline, may d/c with outpt f/u  - replete Mg, keep > 2.0    03-31-18 @ 16:26

## 2018-03-31 NOTE — ED ADULT NURSE NOTE - PMH
Enlarged prostate    Hemorrhagic stroke    High cholesterol    HTN (hypertension)    Right sided weakness

## 2018-03-31 NOTE — ED PROVIDER NOTE - ATTENDING CONTRIBUTION TO CARE
pt bib ems for right sided facial droop and twitching. started at 5am in front of family, it resolved then came back again with ems. pt has phx of hemorraghic stroke per family last year. he has a residual right sided hemiplegia and aphasia. no trauma. no fevers. pt in nad, +right sided facial twitching and tremor to right arm and leg. follows commands but is non verbal. right sided hemiplegia, ctab, rrr, ab soft, nd. no rash. will get CT head, labs, dw neuro, monitor.

## 2018-03-31 NOTE — H&P ADULT - NSHPPHYSICALEXAM_GEN_ALL_CORE
Lying in no acute distress  Pupils equal and reactive to light and accommodation  Supple Neck  Clear to auscultation bilaterally  s1s2 regular rate and rhythm  + bowel sounds nontnender  no cyanosis or edema  awake alert and oriented x 3  0/5 right ue 0/5 right le 3/5 left ue 3/5 left le  cn II-XII intact

## 2018-03-31 NOTE — H&P ADULT - NSHPLABSRESULTS_GEN_ALL_CORE
15.4   13.36 )-----------( 395      ( 31 Mar 2018 07:10 )             45.5   03-31    140  |  99  |  39<H>  ----------------------------<  144<H>  5.1<H>   |  22  |  1.3    Ca    9.7      31 Mar 2018 07:10  Mg     1.9     03-31    TPro  6.3  /  Alb  4.1  /  TBili  0.7  /  DBili  x   /  AST  13  /  ALT  30  /  AlkPhos  85  03-31      < from: CT Head No Cont (03.31.18 @ 06:38) >    IMPRESSION:    Wedge-shaped age-indeterminate, likely subacute infarcts of the left   anterior and posterior frontal regions superimposed on severe cortical   and white matter atrophy secondary to left hemispheric multifocal   infarcts seen in February 2017. No acute intracranial    Further evaluation with MRI would be of benefit.    Dr. Joon Hessdiscussed preliminary findings with MCKENZIE MAYBERRY PA   on 3/31/2018 6:57 AM with readback.    < from: Xray Chest 1 View- PORTABLE-Routine (03.31.18 @ 07:20) >    No radiographic evidence of acute cardiopulmonary disease.    EKG < from: 12 Lead ECG (03.31.18 @ 07:02) >    Diagnosis Line Normal sinus rhythm with sinus arrhythmia  Nonspecific ST abnormality  Abnormal ECG    < end of copied text >

## 2018-03-31 NOTE — ED ADULT NURSE NOTE - OBJECTIVE STATEMENT
Pt non-verbal; spouse present at bedside; verbalized that pt started shaking uncontrollable; spouse gave 2 Tylenol. Pt is alert and oriented.

## 2018-03-31 NOTE — ED PROVIDER NOTE - NS ED ROS FT
Review of Systems:  	•	CONSTITUTIONAL - no fever, no diaphoresis, no chills  	•	SKIN - no rash  	•	HEMATOLOGIC - no bleeding, no bruising  	•	EYES - no eye pain, no blurry vision  	•	ENT - no congestion  	•	RESPIRATORY - no shortness of breath, no cough  	•	CARDIAC - no chest pain  	•	GI - no abd pain, no vomiting, no diarrhea, no constipation  	•	GENITO-URINARY - no foul urine odor  	•	MUSCULOSKELETAL - no swelling, no redness  	•	NEUROLOGIC - +aphasic and right sided weakness which is chronic as per family, +twitching, +right facial droop, no headache, no paresthesias, no LOC

## 2018-03-31 NOTE — ED PROVIDER NOTE - PHYSICAL EXAMINATION
VITAL SIGNS: I have reviewed nursing notes and confirm.  CONSTITUTIONAL: Well-developed; well-nourished; in no acute distress.  SKIN: Skin exam is warm and dry, no acute rash.  HEAD: Normocephalic; atraumatic.  EYES: PERRL, EOM intact; conjunctiva and sclera clear.  ENT: No nasal discharge; airway clear.   NECK: Supple; non tender.  CARD: S1, S2 normal; no murmurs, gallops, or rubs. Regular rate and rhythm.  RESP: Clear to auscultation bilaterally. No wheezes, rales or rhonchi.  ABD: Normal bowel sounds; soft; non-distended; non-tender.   EXT: Right upper extremity and lower extremity contracted. No edema.  LYMPH: No acute cervical adenopathy.  NEURO: Awake and alert following commands however non-verbal. +Aphasic. +Right facial twitching. +Tremors to RUE and RLE. +Right sided oswaldo-plegia. Decreased motor and sensation to right sided (chronic).

## 2018-03-31 NOTE — H&P ADULT - HISTORY OF PRESENT ILLNESS
77 yo M with pmhx CVA in 2/2018 with residual right hemiplegia and aphasia hemorrhagic stroke as per family presenting after family noted what looked like a right facial droop along with tremors to right upper extremity and right lower extremity. As per wife pt was last seen without facial droop and tremors at 5am. No head injury or trauma. No fever or chills. No difficulty breathing. Tremor was constant of right arm and leg , without relieving or aggravating factors, associted with right facial droop ,  	  baseline bedbound, and constant right sided 0/5  since cva

## 2018-04-01 DIAGNOSIS — K92.0 HEMATEMESIS: ICD-10-CM

## 2018-04-01 LAB
ALBUMIN SERPL ELPH-MCNC: 3.8 G/DL — SIGNIFICANT CHANGE UP (ref 3.5–5.2)
ALP SERPL-CCNC: 79 U/L — SIGNIFICANT CHANGE UP (ref 30–115)
ALT FLD-CCNC: 25 U/L — SIGNIFICANT CHANGE UP (ref 0–41)
ANION GAP SERPL CALC-SCNC: 14 MMOL/L — SIGNIFICANT CHANGE UP (ref 7–14)
AST SERPL-CCNC: 13 U/L — SIGNIFICANT CHANGE UP (ref 0–41)
BILIRUB SERPL-MCNC: 1.2 MG/DL — SIGNIFICANT CHANGE UP (ref 0.2–1.2)
BUN SERPL-MCNC: 20 MG/DL — SIGNIFICANT CHANGE UP (ref 10–20)
CALCIUM SERPL-MCNC: 9.2 MG/DL — SIGNIFICANT CHANGE UP (ref 8.5–10.1)
CHLORIDE SERPL-SCNC: 97 MMOL/L — LOW (ref 98–110)
CO2 SERPL-SCNC: 28 MMOL/L — SIGNIFICANT CHANGE UP (ref 17–32)
CREAT SERPL-MCNC: 0.9 MG/DL — SIGNIFICANT CHANGE UP (ref 0.7–1.5)
GLUCOSE SERPL-MCNC: 94 MG/DL — SIGNIFICANT CHANGE UP (ref 70–99)
HCT VFR BLD CALC: 42.9 % — SIGNIFICANT CHANGE UP (ref 42–52)
HCT VFR BLD CALC: 43 % — SIGNIFICANT CHANGE UP (ref 42–52)
HGB BLD-MCNC: 14.4 G/DL — SIGNIFICANT CHANGE UP (ref 14–18)
HGB BLD-MCNC: 14.7 G/DL — SIGNIFICANT CHANGE UP (ref 14–18)
MAGNESIUM SERPL-MCNC: 1.9 MG/DL — SIGNIFICANT CHANGE UP (ref 1.8–2.4)
MCHC RBC-ENTMCNC: 29.7 PG — SIGNIFICANT CHANGE UP (ref 27–31)
MCHC RBC-ENTMCNC: 30.4 PG — SIGNIFICANT CHANGE UP (ref 27–31)
MCHC RBC-ENTMCNC: 33.6 G/DL — SIGNIFICANT CHANGE UP (ref 32–37)
MCHC RBC-ENTMCNC: 34.2 G/DL — SIGNIFICANT CHANGE UP (ref 32–37)
MCV RBC AUTO: 88.5 FL — SIGNIFICANT CHANGE UP (ref 80–94)
MCV RBC AUTO: 88.8 FL — SIGNIFICANT CHANGE UP (ref 80–94)
NRBC # BLD: 0 /100 WBCS — SIGNIFICANT CHANGE UP (ref 0–0)
NRBC # BLD: 0 /100 WBCS — SIGNIFICANT CHANGE UP (ref 0–0)
PLATELET # BLD AUTO: 302 K/UL — SIGNIFICANT CHANGE UP (ref 130–400)
PLATELET # BLD AUTO: 317 K/UL — SIGNIFICANT CHANGE UP (ref 130–400)
POTASSIUM SERPL-MCNC: 3.8 MMOL/L — SIGNIFICANT CHANGE UP (ref 3.5–5)
POTASSIUM SERPL-SCNC: 3.8 MMOL/L — SIGNIFICANT CHANGE UP (ref 3.5–5)
PROT SERPL-MCNC: 5.7 G/DL — LOW (ref 6–8)
RBC # BLD: 4.84 M/UL — SIGNIFICANT CHANGE UP (ref 4.7–6.1)
RBC # BLD: 4.85 M/UL — SIGNIFICANT CHANGE UP (ref 4.7–6.1)
RBC # FLD: 14.1 % — SIGNIFICANT CHANGE UP (ref 11.5–14.5)
RBC # FLD: 14.3 % — SIGNIFICANT CHANGE UP (ref 11.5–14.5)
SODIUM SERPL-SCNC: 139 MMOL/L — SIGNIFICANT CHANGE UP (ref 135–146)
WBC # BLD: 12.38 K/UL — HIGH (ref 4.8–10.8)
WBC # BLD: 14.45 K/UL — HIGH (ref 4.8–10.8)
WBC # FLD AUTO: 12.38 K/UL — HIGH (ref 4.8–10.8)
WBC # FLD AUTO: 14.45 K/UL — HIGH (ref 4.8–10.8)

## 2018-04-01 RX ORDER — PANTOPRAZOLE SODIUM 20 MG/1
40 TABLET, DELAYED RELEASE ORAL ONCE
Qty: 0 | Refills: 0 | Status: COMPLETED | OUTPATIENT
Start: 2018-04-01 | End: 2018-04-01

## 2018-04-01 RX ORDER — ENOXAPARIN SODIUM 100 MG/ML
40 INJECTION SUBCUTANEOUS AT BEDTIME
Qty: 0 | Refills: 0 | Status: DISCONTINUED | OUTPATIENT
Start: 2018-04-01 | End: 2018-04-05

## 2018-04-01 RX ORDER — BACLOFEN 100 %
10 POWDER (GRAM) MISCELLANEOUS EVERY 12 HOURS
Qty: 0 | Refills: 0 | Status: DISCONTINUED | OUTPATIENT
Start: 2018-04-01 | End: 2018-04-05

## 2018-04-01 RX ORDER — PANTOPRAZOLE SODIUM 20 MG/1
40 TABLET, DELAYED RELEASE ORAL
Qty: 0 | Refills: 0 | Status: DISCONTINUED | OUTPATIENT
Start: 2018-04-01 | End: 2018-04-05

## 2018-04-01 RX ADMIN — Medication 10 MILLIGRAM(S): at 21:17

## 2018-04-01 RX ADMIN — PANTOPRAZOLE SODIUM 40 MILLIGRAM(S): 20 TABLET, DELAYED RELEASE ORAL at 13:42

## 2018-04-01 RX ADMIN — LEVETIRACETAM 420 MILLIGRAM(S): 250 TABLET, FILM COATED ORAL at 05:56

## 2018-04-01 RX ADMIN — ATORVASTATIN CALCIUM 40 MILLIGRAM(S): 80 TABLET, FILM COATED ORAL at 21:16

## 2018-04-01 RX ADMIN — FINASTERIDE 5 MILLIGRAM(S): 5 TABLET, FILM COATED ORAL at 11:39

## 2018-04-01 RX ADMIN — PANTOPRAZOLE SODIUM 40 MILLIGRAM(S): 20 TABLET, DELAYED RELEASE ORAL at 18:22

## 2018-04-01 RX ADMIN — ENOXAPARIN SODIUM 40 MILLIGRAM(S): 100 INJECTION SUBCUTANEOUS at 21:17

## 2018-04-01 RX ADMIN — Medication 81 MILLIGRAM(S): at 11:39

## 2018-04-01 RX ADMIN — LOSARTAN POTASSIUM 100 MILLIGRAM(S): 100 TABLET, FILM COATED ORAL at 05:56

## 2018-04-01 RX ADMIN — AMLODIPINE BESYLATE 10 MILLIGRAM(S): 2.5 TABLET ORAL at 05:55

## 2018-04-01 RX ADMIN — Medication 10 MILLIGRAM(S): at 11:39

## 2018-04-01 RX ADMIN — Medication 25 MILLIGRAM(S): at 05:56

## 2018-04-01 RX ADMIN — LEVETIRACETAM 420 MILLIGRAM(S): 250 TABLET, FILM COATED ORAL at 17:54

## 2018-04-01 NOTE — PROGRESS NOTE ADULT - ASSESSMENT
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     Plan:   - Increase Keppra to 1000 mg PO BID  - cont ASA/Plavix for now  - repeat REEG in AM (4/2)  - seizure precautions

## 2018-04-01 NOTE — CONSULT NOTE ADULT - PROBLEM SELECTOR RECOMMENDATION 9
NPO after midnight in case patient changes his mind regarding upper endoscopy  PPI, f/u CBC, Continue ASA and plavix ASAP after endoscopy

## 2018-04-01 NOTE — PROGRESS NOTE ADULT - SUBJECTIVE AND OBJECTIVE BOX
SUBJECTIVE:    Patient is a 78y old  Male who presents with a chief complaint of Right side tremors (31 Mar 2018 14:42)    Currently admitted to medicine with the primary diagnosis of CVA (cerebral vascular accident)     Today is hospital day 1d. This morning he is resting comfortably in bed and reports no new issues or overnight events.     PAST MEDICAL & SURGICAL HISTORY  PAST MEDICAL & SURGICAL HISTORY:  Right sided weakness  High cholesterol  Enlarged prostate  HTN (hypertension)  Hemorrhagic stroke    SOCIAL HISTORY:    ALLERGIES:  penicillins (Unknown)    MEDICATIONS:  STANDING MEDICATIONS  amLODIPine   Tablet 10 milliGRAM(s) Oral daily  aspirin  chewable 81 milliGRAM(s) Oral daily  atorvastatin 40 milliGRAM(s) Oral at bedtime  baclofen 10 milliGRAM(s) Oral daily  clopidogrel Tablet 75 milliGRAM(s) Oral daily  enoxaparin Injectable 40 milliGRAM(s) SubCutaneous at bedtime  finasteride 5 milliGRAM(s) Oral daily  hydrochlorothiazide 25 milliGRAM(s) Oral daily  levETIRAcetam  IVPB 500 milliGRAM(s) IV Intermittent every 12 hours  losartan 100 milliGRAM(s) Oral daily  sodium chloride 0.9%. 1000 milliLiter(s) IV Continuous <Continuous>    PRN MEDICATIONS  traMADol 25 milliGRAM(s) Oral two times a day PRN    VITALS:   T(F): 96  HR: 65  BP: 152/70  RR: 18  SpO2: 96%    LABS:                        14.7   12.38 )-----------( 302      ( 01 Apr 2018 08:05 )             43.0     04-01    139  |  97<L>  |  20  ----------------------------<  94  3.8   |  28  |  0.9    Ca    9.2      01 Apr 2018 08:05  Mg     1.9     04-01    TPro  5.7<L>  /  Alb  3.8  /  TBili  1.2  /  DBili  x   /  AST  13  /  ALT  25  /  AlkPhos  79  04-01    PT/INR - ( 31 Mar 2018 07:10 )   PT: 11.10 sec;   INR: 1.03 ratio         PTT - ( 31 Mar 2018 07:10 )  PTT:31.4 sec          CARDIAC MARKERS ( 31 Mar 2018 07:10 )  x     / <0.01 ng/mL / 38 U/L / x     / 2.1 ng/mL      RADIOLOGY:    CT H NC: 1.  Wedge-shaped age-indeterminate, likely subacute to chronic infarcts   of the left anterior and posterior frontal regions superimposed on severe   cortical and white matter atrophy secondary to left hemispheric   multifocal infarcts seen in February 2017.     2.  No acute mass effect or intracranial hemorrhage.    3.  Further evaluation with MRI may be helpful if clinically indicated    EEG: Abnormal routine EEG due to the presence of left hemispheric focal   slowing and left hemispheric sharp transients    PHYSICAL EXAM:  GEN: No acute distress  HEENT:   LUNGS: Clear to auscultation bilaterally   HEART: S1/S2 present. RRR.   ABD: Soft, non-tender, non-distended. Bowel sounds present  EXT: no p. edema  NEURO:  single word sentences.  follows commands.  severe aphasia.  Cranial nerves: EOMI w/o nystagmus.  PERRLA.  No ptosis/weakness of eyelid closure.  Facial sensation is normal with normal bite.  Motor examination:   spastic R hemiplegia.  LUE/LLE 5/5 SUBJECTIVE:    Patient is a 78y old  Male who presents with a chief complaint of Right side tremors (31 Mar 2018 14:42)    Currently admitted to medicine with the primary diagnosis of CVA (cerebral vascular accident)     Today is hospital day 1d. This morning he is resting comfortably in bed. Pt had 1 episode of vomiting bright red blood per family. Repeat cbc done without change.     PAST MEDICAL & SURGICAL HISTORY  PAST MEDICAL & SURGICAL HISTORY:  Right sided weakness  High cholesterol  Enlarged prostate  HTN (hypertension)  Hemorrhagic stroke  B/L carotid stenosis     SOCIAL HISTORY:    ALLERGIES:  penicillins (Unknown)    MEDICATIONS:  STANDING MEDICATIONS  amLODIPine   Tablet 10 milliGRAM(s) Oral daily  aspirin  chewable 81 milliGRAM(s) Oral daily  atorvastatin 40 milliGRAM(s) Oral at bedtime  baclofen 10 milliGRAM(s) Oral daily  clopidogrel Tablet 75 milliGRAM(s) Oral daily  enoxaparin Injectable 40 milliGRAM(s) SubCutaneous at bedtime  finasteride 5 milliGRAM(s) Oral daily  hydrochlorothiazide 25 milliGRAM(s) Oral daily  levETIRAcetam  IVPB 500 milliGRAM(s) IV Intermittent every 12 hours  losartan 100 milliGRAM(s) Oral daily  sodium chloride 0.9%. 1000 milliLiter(s) IV Continuous <Continuous>    PRN MEDICATIONS  traMADol 25 milliGRAM(s) Oral two times a day PRN    VITALS:   T(F): 96  HR: 65  BP: 152/70  RR: 18  SpO2: 96%    LABS:                        14.7   12.38 )-----------( 302      ( 01 Apr 2018 08:05 )             43.0     04-01    139  |  97<L>  |  20  ----------------------------<  94  3.8   |  28  |  0.9    Ca    9.2      01 Apr 2018 08:05  Mg     1.9     04-01    TPro  5.7<L>  /  Alb  3.8  /  TBili  1.2  /  DBili  x   /  AST  13  /  ALT  25  /  AlkPhos  79  04-01    PT/INR - ( 31 Mar 2018 07:10 )   PT: 11.10 sec;   INR: 1.03 ratio         PTT - ( 31 Mar 2018 07:10 )  PTT:31.4 sec          CARDIAC MARKERS ( 31 Mar 2018 07:10 )  x     / <0.01 ng/mL / 38 U/L / x     / 2.1 ng/mL      RADIOLOGY:    CT H NC: 1.  Wedge-shaped age-indeterminate, likely subacute to chronic infarcts   of the left anterior and posterior frontal regions superimposed on severe   cortical and white matter atrophy secondary to left hemispheric   multifocal infarcts seen in February 2017.     2.  No acute mass effect or intracranial hemorrhage.    3.  Further evaluation with MRI may be helpful if clinically indicated    EEG: Abnormal routine EEG due to the presence of left hemispheric focal   slowing and left hemispheric sharp transients    PHYSICAL EXAM:  GEN: No acute distress  HEENT:   LUNGS: Clear to auscultation bilaterally   HEART: S1/S2 present. RRR.   ABD: Soft, non-tender, non-distended. Bowel sounds present  EXT: no p. edema  NEURO:  single word sentences.  follows commands.  severe aphasia.  Cranial nerves: EOMI w/o nystagmus.  PERRLA.  Right facial droop + . Facial sensation is normal with normal bite.  Motor examination:   spastic R hemiplegia.  LUE/LLE 5/5

## 2018-04-01 NOTE — CONSULT NOTE ADULT - ASSESSMENT
Hematemesis one time with stable VS and HGB no abdominal pain, patient is high risk for CVA of Plavix and upper endoscopy is recommended( patient is refusing for the moment decision made in his wife presence)

## 2018-04-01 NOTE — CONSULT NOTE ADULT - SUBJECTIVE AND OBJECTIVE BOX
Chief Complaint:  Patient is a 78y old  Male who presents with h/o hematemesis, patient on plavix with h/o CVA and bilateral carotid stenosis      HPI:    Allergies:  penicillins (Unknown)      Home Medications:    Hospital Medications:  amLODIPine   Tablet 10 milliGRAM(s) Oral daily  aspirin  chewable 81 milliGRAM(s) Oral daily  atorvastatin 40 milliGRAM(s) Oral at bedtime  baclofen 10 milliGRAM(s) Oral daily  finasteride 5 milliGRAM(s) Oral daily  hydrochlorothiazide 25 milliGRAM(s) Oral daily  levETIRAcetam  IVPB 500 milliGRAM(s) IV Intermittent every 12 hours  losartan 100 milliGRAM(s) Oral daily  pantoprazole  Injectable 40 milliGRAM(s) IV Push two times a day  pantoprazole  Injectable 40 milliGRAM(s) IV Push once  sodium chloride 0.9%. 1000 milliLiter(s) IV Continuous <Continuous>  traMADol 25 milliGRAM(s) Oral two times a day PRN      PMHX/PSHX:  Right sided weakness  High cholesterol  Enlarged prostate  HTN (hypertension)  Hemorrhagic stroke      Family history:      Social History:     ROS:     General:  No wt loss, fevers, chills, night sweats, fatigue,   Eyes:  Good vision, no reported pain  ENT:  No sore throat, pain, runny nose, dysphagia  CV:  No pain, palpitations, hypo/hypertension  Resp:  No dyspnea, cough, tachypnea, wheezing  GI:  See HPI no melena, no abdominal pain  :  No pain, bleeding, incontinence, nocturia  Muscle:  No pain, weakness  Neuro: see H&P  Psych:  No fatigue, insomnia, mood problems, depression  Endocrine:  No polyuria, polydipsia, cold/heat intolerance  Heme:  No petechiae, ecchymosis, easy bruisability  Skin:  No rash, edema      PHYSICAL EXAM:     GENERAL:  Appears stated age, well-groomed, well-nourished, no distress  HEENT:  NC/AT,  conjunctivae clear and pink,  no JVD  CHEST:  Full & symmetric excursion, no increased effort, breath sounds clear  HEART:  Regular rhythm, S1, S2, no murmur/rub/S3/S4, no abdominal bruit, no edema  ABDOMEN:  Soft, non-tender, non-distended, normoactive bowel sounds,  no masses ,  EXTREMITIES:  no cyanosis ,clubbing or edema  SKIN:  No rash/erythema/ecchymoses/petechiae/wounds/abscess/warm/dry  NEURO:  Alert, oriented    Vital Signs:  Vital Signs Last 24 Hrs  T(C): 35.6 (01 Apr 2018 05:47), Max: 36.1 (31 Mar 2018 22:10)  T(F): 96 (01 Apr 2018 05:47), Max: 97 (31 Mar 2018 22:10)  HR: 65 (01 Apr 2018 05:47) (65 - 74)  BP: 152/70 (01 Apr 2018 05:47) (132/68 - 152/70)  BP(mean): --  RR: 18 (01 Apr 2018 05:47) (16 - 18)  SpO2: --  Daily     Daily     LABS:                        14.7   12.38 )-----------( 302      ( 01 Apr 2018 08:05 )             43.0     04-01    139  |  97<L>  |  20  ----------------------------<  94  3.8   |  28  |  0.9    Ca    9.2      01 Apr 2018 08:05  Mg     1.9     04-01    TPro  5.7<L>  /  Alb  3.8  /  TBili  1.2  /  DBili  x   /  AST  13  /  ALT  25  /  AlkPhos  79  04-01    LIVER FUNCTIONS - ( 01 Apr 2018 08:05 )  Alb: 3.8 g/dL / Pro: 5.7 g/dL / ALK PHOS: 79 U/L / ALT: 25 U/L / AST: 13 U/L / GGT: x           PT/INR - ( 31 Mar 2018 07:10 )   PT: 11.10 sec;   INR: 1.03 ratio         PTT - ( 31 Mar 2018 07:10 )  PTT:31.4 sec        Imaging:

## 2018-04-01 NOTE — PROGRESS NOTE ADULT - PROBLEM SELECTOR PLAN 5
-1 episode overnight  - Hemodynamically stable, no drop in Hb  - start IV Protonix 40 bid  - Hold plavix and lovenox   - SCD's for PPx   - GI f/u   -Repeat CBC -1 episode overnight  - Hemodynamically stable, no drop in Hb  - start IV Protonix 40 bid  - NPO  - GI recommending EGD pt wants to think about it till tomorrow. Kept NPO.  - CBC at 3pm and 4am.  - Hold plavix and lovenox   - SCD's for PPx   - GI f/u   -Repeat CBC

## 2018-04-01 NOTE — PROGRESS NOTE ADULT - SUBJECTIVE AND OBJECTIVE BOX
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 As per wife pt was last seen without facial droop and tremors at 5am. No head injury or trauma. No fever or chills. No difficulty breathing. Tremor was constant of right arm and leg , without relieving or aggravating factors, associted with right facial droop ,  	  baseline bedbound, and constant right sided 0/5  since cva (31 Mar 2018 12:56)    PAST MEDICAL & SURGICAL HISTORY:  Right sided weakness  High cholesterol  Enlarged prostate  HTN (hypertension)  Hemorrhagic stroke    Vital Signs Last 24 Hrs  T(C): 35.6 (01 Apr 2018 05:47), Max: 36.1 (31 Mar 2018 22:10)  T(F): 96 (01 Apr 2018 05:47), Max: 97 (31 Mar 2018 22:10)  HR: 65 (01 Apr 2018 05:47) (65 - 74)  BP: 152/70 (01 Apr 2018 05:47) (132/68 - 152/70)  BP(mean): --  RR: 18 (01 Apr 2018 05:47) (16 - 18)  SpO2: --    Neurological Exam:   Mental status: Awake, alert and oriented x3.  Recent and remote memory intact.  Naming, repetition and comprehension intact.  Attention/concentration intact.  No dysarthria, no aphasia.  Fund of knowledge appropriate.    Cranial nerves: Pupils equally round and reactive to light, visual fields full, no nystagmus, extraocular muscles intact, V1 through V3 intact bilaterally and symmetric, face symmetric, hearing intact to finger rub, palate elevation symmetric, tongue was midline.  Motor:  MRC grading 5/5 b/l UE/LE.   strength 5/5.  Normal tone and bulk.  No abnormal movements.    Sensation: Intact to light touch, proprioception, and pinprick.   Coordination: No dysmetria on finger-to-nose and heel-to-shin.  No dysdiadokinesia.  Reflexes: 2+ in bilateral UE/LE, downgoing toes bilaterally. (-) Stout.  Gait: Narrow and steady. No ataxia.  Romberg negative    amLODIPine   Tablet 10 milliGRAM(s) Oral daily  aspirin  chewable 81 milliGRAM(s) Oral daily  atorvastatin 40 milliGRAM(s) Oral at bedtime  baclofen 10 milliGRAM(s) Oral daily  enoxaparin Injectable 40 milliGRAM(s) SubCutaneous at bedtime  finasteride 5 milliGRAM(s) Oral daily  hydrochlorothiazide 25 milliGRAM(s) Oral daily  levETIRAcetam  IVPB 500 milliGRAM(s) IV Intermittent every 12 hours  losartan 100 milliGRAM(s) Oral daily  pantoprazole  Injectable 40 milliGRAM(s) IV Push two times a day  sodium chloride 0.9%. 1000 milliLiter(s) IV Continuous <Continuous>  traMADol 25 milliGRAM(s) Oral two times a day PRN      Labs:  CBC Full  -  ( 01 Apr 2018 08:05 )  WBC Count : 12.38 K/uL  Hemoglobin : 14.7 g/dL  Hematocrit : 43.0 %  Platelet Count - Automated : 302 K/uL  Mean Cell Volume : 88.8 fL  Mean Cell Hemoglobin : 30.4 pg  Mean Cell Hemoglobin Concentration : 34.2 g/dL    04-01    139  |  97<L>  |  20  ----------------------------<  94  3.8   |  28  |  0.9    Ca    9.2      01 Apr 2018 08:05  Mg     1.9     04-01    TPro  5.7<L>  /  Alb  3.8  /  TBili  1.2  /  DBili  x   /  AST  13  /  ALT  25  /  AlkPhos  79  04-01    LIVER FUNCTIONS - ( 01 Apr 2018 08:05 )  Alb: 3.8 g/dL / Pro: 5.7 g/dL / ALK PHOS: 79 U/L / ALT: 25 U/L / AST: 13 U/L / GGT: x           PT/INR - ( 31 Mar 2018 07:10 )   PT: 11.10 sec;   INR: 1.03 ratio       PTT - ( 31 Mar 2018 07:10 )  PTT:31.4 sec    < from: EEG (03.31.18 @ 14:40) >  IMPRESSIONS  Abnormal routine EEG due to the presence of left hemispheric focal   slowing and left hemispheric sharp transients    < end of copied text > Neurology Progress Note    Interval History:  CURRENTLY DOING WELL, TOLERATING MEDICATIONS.  AT HIS BASELINE.  NO NEW FOCAL DEFICITS.  NO EVENTS OVERNIGHT. PER FAMILY HAS HAD INTERMITTENT RUE/RLE SHAKING OVER THE LAST FEW MONTHS USUALLY RESOLVES SPONTANEOUSLY.    HPI:  77 yo M with pmhx CVA in 2/2018 with residual right hemiplegia and aphasia hemorrhagic stroke as per family presenting after family noted what looked like a right facial droop along with tremors to right upper extremity and right lower extremity. As per wife pt was last seen without facial droop and tremors at 5am. No head injury or trauma. No fever or chills. No difficulty breathing. Tremor was constant of right arm and leg , without relieving or aggravating factors, associted with right facial droop ,  	  baseline bedbound, and constant right sided 0/5  since cva (31 Mar 2018 12:56)    PAST MEDICAL & SURGICAL HISTORY:  Right sided weakness  High cholesterol  Enlarged prostate  HTN (hypertension)  Hemorrhagic stroke    Vital Signs Last 24 Hrs  T(C): 35.6 (01 Apr 2018 05:47), Max: 36.1 (31 Mar 2018 22:10)  T(F): 96 (01 Apr 2018 05:47), Max: 97 (31 Mar 2018 22:10)  HR: 65 (01 Apr 2018 05:47) (65 - 74)  BP: 152/70 (01 Apr 2018 05:47) (132/68 - 152/70)  BP(mean): --  RR: 18 (01 Apr 2018 05:47) (16 - 18)  SpO2: --    Neurological Exam:   Mental status: Awake, alert and oriented x3.  Recent and remote memory intact.  Naming, repetition and comprehension intact.  Attention/concentration intact.  No dysarthria, no aphasia.  Fund of knowledge appropriate.    Cranial nerves: Pupils equally round and reactive to light, visual fields full, no nystagmus, extraocular muscles intact, V1 through V3 intact bilaterally and symmetric, face symmetric, hearing intact to finger rub, palate elevation symmetric, tongue was midline.  Motor:  MRC grading 5/5 b/l UE/LE.   strength 5/5.  Normal tone and bulk.  No abnormal movements.    Sensation: Intact to light touch, proprioception, and pinprick.   Coordination: No dysmetria on finger-to-nose and heel-to-shin.  No dysdiadokinesia.  Reflexes: 2+ in bilateral UE/LE, downgoing toes bilaterally. (-) Stout.  Gait: Narrow and steady. No ataxia.  Romberg negative    amLODIPine   Tablet 10 milliGRAM(s) Oral daily  aspirin  chewable 81 milliGRAM(s) Oral daily  atorvastatin 40 milliGRAM(s) Oral at bedtime  baclofen 10 milliGRAM(s) Oral daily  enoxaparin Injectable 40 milliGRAM(s) SubCutaneous at bedtime  finasteride 5 milliGRAM(s) Oral daily  hydrochlorothiazide 25 milliGRAM(s) Oral daily  levETIRAcetam  IVPB 500 milliGRAM(s) IV Intermittent every 12 hours  losartan 100 milliGRAM(s) Oral daily  pantoprazole  Injectable 40 milliGRAM(s) IV Push two times a day  sodium chloride 0.9%. 1000 milliLiter(s) IV Continuous <Continuous>  traMADol 25 milliGRAM(s) Oral two times a day PRN      Labs:  CBC Full  -  ( 01 Apr 2018 08:05 )  WBC Count : 12.38 K/uL  Hemoglobin : 14.7 g/dL  Hematocrit : 43.0 %  Platelet Count - Automated : 302 K/uL  Mean Cell Volume : 88.8 fL  Mean Cell Hemoglobin : 30.4 pg  Mean Cell Hemoglobin Concentration : 34.2 g/dL    04-01    139  |  97<L>  |  20  ----------------------------<  94  3.8   |  28  |  0.9    Ca    9.2      01 Apr 2018 08:05  Mg     1.9     04-01    TPro  5.7<L>  /  Alb  3.8  /  TBili  1.2  /  DBili  x   /  AST  13  /  ALT  25  /  AlkPhos  79  04-01    LIVER FUNCTIONS - ( 01 Apr 2018 08:05 )  Alb: 3.8 g/dL / Pro: 5.7 g/dL / ALK PHOS: 79 U/L / ALT: 25 U/L / AST: 13 U/L / GGT: x           PT/INR - ( 31 Mar 2018 07:10 )   PT: 11.10 sec;   INR: 1.03 ratio       PTT - ( 31 Mar 2018 07:10 )  PTT:31.4 sec    < from: EEG (03.31.18 @ 14:40) >  IMPRESSIONS  Abnormal routine EEG due to the presence of left hemispheric focal   slowing and left hemispheric sharp transients    < end of copied text >

## 2018-04-01 NOTE — PROGRESS NOTE ADULT - ASSESSMENT
78y male presented from home with constant tremors x 1 day releived  with ativan , history signfiicant for cva (hemorrhagic) with residual right hemiplegia and aphasia 	  DNR/DNI 78y male presented from home with constant tremors x 1 day releived  with ativan , history signfiicant for cva (hemorrhagic) with residual right hemiplegia and aphasia 	  DNR/DNI. Developed witnessed hematemasis while on ASA, Plavix and so far declined EGD.

## 2018-04-02 LAB
ANION GAP SERPL CALC-SCNC: 15 MMOL/L — HIGH (ref 7–14)
BUN SERPL-MCNC: 17 MG/DL — SIGNIFICANT CHANGE UP (ref 10–20)
CALCIUM SERPL-MCNC: 9.3 MG/DL — SIGNIFICANT CHANGE UP (ref 8.5–10.1)
CHLORIDE SERPL-SCNC: 95 MMOL/L — LOW (ref 98–110)
CO2 SERPL-SCNC: 28 MMOL/L — SIGNIFICANT CHANGE UP (ref 17–32)
CREAT SERPL-MCNC: 0.9 MG/DL — SIGNIFICANT CHANGE UP (ref 0.7–1.5)
GLUCOSE SERPL-MCNC: 80 MG/DL — SIGNIFICANT CHANGE UP (ref 70–99)
HCT VFR BLD CALC: 46.8 % — SIGNIFICANT CHANGE UP (ref 42–52)
HGB BLD-MCNC: 15.7 G/DL — SIGNIFICANT CHANGE UP (ref 14–18)
MAGNESIUM SERPL-MCNC: 1.8 MG/DL — SIGNIFICANT CHANGE UP (ref 1.8–2.4)
MCHC RBC-ENTMCNC: 29.9 PG — SIGNIFICANT CHANGE UP (ref 27–31)
MCHC RBC-ENTMCNC: 33.5 G/DL — SIGNIFICANT CHANGE UP (ref 32–37)
MCV RBC AUTO: 89.1 FL — SIGNIFICANT CHANGE UP (ref 80–94)
NRBC # BLD: 0 /100 WBCS — SIGNIFICANT CHANGE UP (ref 0–0)
PLATELET # BLD AUTO: 285 K/UL — SIGNIFICANT CHANGE UP (ref 130–400)
POTASSIUM SERPL-MCNC: 3.6 MMOL/L — SIGNIFICANT CHANGE UP (ref 3.5–5)
POTASSIUM SERPL-SCNC: 3.6 MMOL/L — SIGNIFICANT CHANGE UP (ref 3.5–5)
RBC # BLD: 5.25 M/UL — SIGNIFICANT CHANGE UP (ref 4.7–6.1)
RBC # FLD: 14 % — SIGNIFICANT CHANGE UP (ref 11.5–14.5)
SODIUM SERPL-SCNC: 138 MMOL/L — SIGNIFICANT CHANGE UP (ref 135–146)
WBC # BLD: 11.24 K/UL — HIGH (ref 4.8–10.8)
WBC # FLD AUTO: 11.24 K/UL — HIGH (ref 4.8–10.8)

## 2018-04-02 RX ORDER — LEVETIRACETAM 250 MG/1
1000 TABLET, FILM COATED ORAL
Qty: 0 | Refills: 0 | Status: DISCONTINUED | OUTPATIENT
Start: 2018-04-02 | End: 2018-04-05

## 2018-04-02 RX ORDER — MAGNESIUM OXIDE 400 MG ORAL TABLET 241.3 MG
400 TABLET ORAL ONCE
Qty: 0 | Refills: 0 | Status: COMPLETED | OUTPATIENT
Start: 2018-04-02 | End: 2018-04-02

## 2018-04-02 RX ADMIN — Medication 25 MILLIGRAM(S): at 05:35

## 2018-04-02 RX ADMIN — LEVETIRACETAM 420 MILLIGRAM(S): 250 TABLET, FILM COATED ORAL at 05:35

## 2018-04-02 RX ADMIN — ENOXAPARIN SODIUM 40 MILLIGRAM(S): 100 INJECTION SUBCUTANEOUS at 21:47

## 2018-04-02 RX ADMIN — MAGNESIUM OXIDE 400 MG ORAL TABLET 400 MILLIGRAM(S): 241.3 TABLET ORAL at 11:34

## 2018-04-02 RX ADMIN — LOSARTAN POTASSIUM 100 MILLIGRAM(S): 100 TABLET, FILM COATED ORAL at 05:35

## 2018-04-02 RX ADMIN — PANTOPRAZOLE SODIUM 40 MILLIGRAM(S): 20 TABLET, DELAYED RELEASE ORAL at 18:19

## 2018-04-02 RX ADMIN — ATORVASTATIN CALCIUM 40 MILLIGRAM(S): 80 TABLET, FILM COATED ORAL at 21:46

## 2018-04-02 RX ADMIN — Medication 10 MILLIGRAM(S): at 18:19

## 2018-04-02 RX ADMIN — PANTOPRAZOLE SODIUM 40 MILLIGRAM(S): 20 TABLET, DELAYED RELEASE ORAL at 13:52

## 2018-04-02 RX ADMIN — FINASTERIDE 5 MILLIGRAM(S): 5 TABLET, FILM COATED ORAL at 11:36

## 2018-04-02 RX ADMIN — LEVETIRACETAM 1000 MILLIGRAM(S): 250 TABLET, FILM COATED ORAL at 18:19

## 2018-04-02 RX ADMIN — SODIUM CHLORIDE 50 MILLILITER(S): 9 INJECTION INTRAMUSCULAR; INTRAVENOUS; SUBCUTANEOUS at 23:55

## 2018-04-02 RX ADMIN — Medication 81 MILLIGRAM(S): at 11:35

## 2018-04-02 RX ADMIN — AMLODIPINE BESYLATE 10 MILLIGRAM(S): 2.5 TABLET ORAL at 05:34

## 2018-04-02 RX ADMIN — Medication 10 MILLIGRAM(S): at 05:35

## 2018-04-02 NOTE — SWALLOW BEDSIDE ASSESSMENT ADULT - NS SPL SWALLOW CLINIC TRIAL FT
tolerated trials above without overt s.s of aspiration or penetration continued to use consecutive swallow to management PO.

## 2018-04-02 NOTE — PROGRESS NOTE ADULT - ASSESSMENT
78y male presented from home with constant tremors x 1 day releived  with ativan , history signfiicant for cva (hemorrhagic) with residual right hemiplegia and aphasia 	  DNR/DNI. Developed witnessed hematemasis while on ASA, Plavix   now agreeable to egd

## 2018-04-02 NOTE — SWALLOW BEDSIDE ASSESSMENT ADULT - ASR SWALLOW ASPIRATION MONITOR
pneumonia/oral hygiene/position upright (90Y)/fever/throat clearing/gurgly voice/upper respiratory infection/change of breathing pattern/cough

## 2018-04-02 NOTE — SWALLOW BEDSIDE ASSESSMENT ADULT - COMMENTS
Previous VFSS on 01/09/18 demonstrated recommendations for regular with thin liquids alternating solids with liquids and checking oral cavity post PO

## 2018-04-02 NOTE — SWALLOW BEDSIDE ASSESSMENT ADULT - SLP PERTINENT HISTORY OF CURRENT PROBLEM
admit with ?cva and seizure activity. During admission +bloody emesis.; patient with h/o CVA aphasia, apraxia, and dysarthria.  Cognitive impairment.

## 2018-04-02 NOTE — PROGRESS NOTE ADULT - SUBJECTIVE AND OBJECTIVE BOX
GI Followup Note:   Pt seen and examined at bedside.   78y year old  Male seen  by GI  for  a single episode of hematemesis several days ago      Subjective:      REVIEW OF SYSTEMS:  Constitutional: No fever, weight loss or fatigue  Cardiovascular: No chest pain, palpitations, dizziness or leg swelling  Gastrointestinal: No abdominal or epigastric pain. No nausea, vomiting or hematemesis; No diarrhea or constipation. No melena or hematochezia.  Skin: No itching, burning, rashes or lesions     Allergies: penicillins (Unknown)      Medications:   amLODIPine   Tablet 10 milliGRAM(s) Oral daily  aspirin  chewable 81 milliGRAM(s) Oral daily  atorvastatin 40 milliGRAM(s) Oral at bedtime  baclofen 10 milliGRAM(s) Oral every 12 hours  enoxaparin Injectable 40 milliGRAM(s) SubCutaneous at bedtime  finasteride 5 milliGRAM(s) Oral daily  hydrochlorothiazide 25 milliGRAM(s) Oral daily  levETIRAcetam 1000 milliGRAM(s) Oral two times a day  losartan 100 milliGRAM(s) Oral daily  pantoprazole  Injectable 40 milliGRAM(s) IV Push two times a day  sodium chloride 0.9%. 1000 milliLiter(s) IV Continuous <Continuous>  traMADol 25 milliGRAM(s) Oral two times a day PRN      PHYSICAL EXAM:    Vital Signs Last 24 Hrs  T(C): 36.1 (02 Apr 2018 05:48), Max: 36.6 (01 Apr 2018 14:12)  T(F): 96.9 (02 Apr 2018 05:48), Max: 97.8 (01 Apr 2018 14:12)  HR: 76 (02 Apr 2018 05:48) (66 - 76)  BP: 160/74 (02 Apr 2018 05:48) (133/61 - 160/74)  BP(mean): --  RR: 16 (02 Apr 2018 05:48) (16 - 16)  SpO2: --  GENERAL:  Appears stated age, well-groomed, well-nourished, no distress  HEENT:  NC/AT,  conjunctivae clear and pink, no thyromegaly, nodules, adenopathy, no JVD, sclera -anicteric  CHEST:  Full & symmetric excursion, no increased effort, breath sounds clear  HEART:  Regular rhythm, S1, S2, no murmur/rub/S3/S4, no abdominal bruit, no edema  ABDOMEN:  Soft, non-tender, non-distended, normoactive bowel sounds,  no masses ,no hepato-splenomegaly, no signs of chronic liver disease  EXTREMITIES:  no cyanosis,clubbing or edema  SKIN:  No rash/erythema/ecchymoses/petechiae/wounds/abscess/warm/dry      LABS:                        15.7   11.24 )-----------( 285      ( 02 Apr 2018 07:07 )             46.8     04-02    138  |  95<L>  |  17  ----------------------------<  80  3.6   |  28  |  0.9    Ca    9.3      02 Apr 2018 07:07  Mg     1.9     04-01    TPro  5.7<L>  /  Alb  3.8  /  TBili  1.2  /  DBili  x   /  AST  13  /  ALT  25  /  AlkPhos  79  04-01

## 2018-04-02 NOTE — SWALLOW BEDSIDE ASSESSMENT ADULT - SWALLOW EVAL: CURRENT DIET
clear liquids per GI; Per  SLP may trial and recommend puree no chewables at this time due to GI restrictions/.

## 2018-04-03 DIAGNOSIS — B37.9 CANDIDIASIS, UNSPECIFIED: ICD-10-CM

## 2018-04-03 LAB
ANION GAP SERPL CALC-SCNC: 12 MMOL/L — SIGNIFICANT CHANGE UP (ref 7–14)
BUN SERPL-MCNC: 17 MG/DL — SIGNIFICANT CHANGE UP (ref 10–20)
CALCIUM SERPL-MCNC: 8.7 MG/DL — SIGNIFICANT CHANGE UP (ref 8.5–10.1)
CHLORIDE SERPL-SCNC: 101 MMOL/L — SIGNIFICANT CHANGE UP (ref 98–110)
CO2 SERPL-SCNC: 28 MMOL/L — SIGNIFICANT CHANGE UP (ref 17–32)
CREAT SERPL-MCNC: 1 MG/DL — SIGNIFICANT CHANGE UP (ref 0.7–1.5)
GLUCOSE SERPL-MCNC: 102 MG/DL — HIGH (ref 70–99)
HCT VFR BLD CALC: 42.3 % — SIGNIFICANT CHANGE UP (ref 42–52)
HGB BLD-MCNC: 14.5 G/DL — SIGNIFICANT CHANGE UP (ref 14–18)
MAGNESIUM SERPL-MCNC: 1.8 MG/DL — SIGNIFICANT CHANGE UP (ref 1.8–2.4)
MCHC RBC-ENTMCNC: 30.3 PG — SIGNIFICANT CHANGE UP (ref 27–31)
MCHC RBC-ENTMCNC: 34.3 G/DL — SIGNIFICANT CHANGE UP (ref 32–37)
MCV RBC AUTO: 88.5 FL — SIGNIFICANT CHANGE UP (ref 80–94)
NRBC # BLD: 0 /100 WBCS — SIGNIFICANT CHANGE UP (ref 0–0)
PLATELET # BLD AUTO: 228 K/UL — SIGNIFICANT CHANGE UP (ref 130–400)
POTASSIUM SERPL-MCNC: 3.7 MMOL/L — SIGNIFICANT CHANGE UP (ref 3.5–5)
POTASSIUM SERPL-SCNC: 3.7 MMOL/L — SIGNIFICANT CHANGE UP (ref 3.5–5)
RBC # BLD: 4.78 M/UL — SIGNIFICANT CHANGE UP (ref 4.7–6.1)
RBC # FLD: 13.9 % — SIGNIFICANT CHANGE UP (ref 11.5–14.5)
SODIUM SERPL-SCNC: 141 MMOL/L — SIGNIFICANT CHANGE UP (ref 135–146)
WBC # BLD: 10.31 K/UL — SIGNIFICANT CHANGE UP (ref 4.8–10.8)
WBC # FLD AUTO: 10.31 K/UL — SIGNIFICANT CHANGE UP (ref 4.8–10.8)

## 2018-04-03 RX ORDER — ACETAMINOPHEN 500 MG
650 TABLET ORAL EVERY 6 HOURS
Qty: 0 | Refills: 0 | Status: DISCONTINUED | OUTPATIENT
Start: 2018-04-03 | End: 2018-04-05

## 2018-04-03 RX ORDER — NYSTATIN 500MM UNIT
500000 POWDER (EA) MISCELLANEOUS ONCE
Qty: 0 | Refills: 0 | Status: COMPLETED | OUTPATIENT
Start: 2018-04-03 | End: 2018-04-03

## 2018-04-03 RX ORDER — MAGNESIUM SULFATE 500 MG/ML
2 VIAL (ML) INJECTION ONCE
Qty: 0 | Refills: 0 | Status: COMPLETED | OUTPATIENT
Start: 2018-04-03 | End: 2018-04-03

## 2018-04-03 RX ADMIN — ATORVASTATIN CALCIUM 40 MILLIGRAM(S): 80 TABLET, FILM COATED ORAL at 22:14

## 2018-04-03 RX ADMIN — PANTOPRAZOLE SODIUM 40 MILLIGRAM(S): 20 TABLET, DELAYED RELEASE ORAL at 22:14

## 2018-04-03 RX ADMIN — FINASTERIDE 5 MILLIGRAM(S): 5 TABLET, FILM COATED ORAL at 19:26

## 2018-04-03 RX ADMIN — LOSARTAN POTASSIUM 100 MILLIGRAM(S): 100 TABLET, FILM COATED ORAL at 06:46

## 2018-04-03 RX ADMIN — Medication 650 MILLIGRAM(S): at 20:36

## 2018-04-03 RX ADMIN — LEVETIRACETAM 1000 MILLIGRAM(S): 250 TABLET, FILM COATED ORAL at 07:01

## 2018-04-03 RX ADMIN — Medication 10 MILLIGRAM(S): at 06:45

## 2018-04-03 RX ADMIN — Medication 25 MILLIGRAM(S): at 06:46

## 2018-04-03 RX ADMIN — ENOXAPARIN SODIUM 40 MILLIGRAM(S): 100 INJECTION SUBCUTANEOUS at 22:14

## 2018-04-03 RX ADMIN — PANTOPRAZOLE SODIUM 40 MILLIGRAM(S): 20 TABLET, DELAYED RELEASE ORAL at 07:02

## 2018-04-03 RX ADMIN — Medication 10 MILLIGRAM(S): at 19:26

## 2018-04-03 RX ADMIN — Medication 650 MILLIGRAM(S): at 22:00

## 2018-04-03 RX ADMIN — AMLODIPINE BESYLATE 10 MILLIGRAM(S): 2.5 TABLET ORAL at 06:46

## 2018-04-03 RX ADMIN — Medication 81 MILLIGRAM(S): at 12:54

## 2018-04-03 RX ADMIN — Medication 500000 UNIT(S): at 19:29

## 2018-04-03 RX ADMIN — Medication 50 GRAM(S): at 12:54

## 2018-04-03 RX ADMIN — Medication 200 MILLIGRAM(S): at 20:36

## 2018-04-03 RX ADMIN — LEVETIRACETAM 1000 MILLIGRAM(S): 250 TABLET, FILM COATED ORAL at 19:26

## 2018-04-03 NOTE — PROGRESS NOTE ADULT - SUBJECTIVE AND OBJECTIVE BOX
YAHAIRA MONTIEL  78y  [employer]-[facility] [address] [place] [address] 1      Patient is a 78y old  Male who presents with a chief complaint of Right side tremors (31 Mar 2018 14:42)      INTERVAL HPI/OVERNIGHT EVENTS:    no events overnight, evlaluated by speech and swallow, on modified diet , awaiting egd    REVIEW OF SYSTEMS:  CONSTITUTIONAL: No fever, weight loss, or fatigue  EYES: No eye pain, visual disturbances, or discharge  ENMT:  No difficulty hearing, tinnitus, vertigo; No sinus or throat pain  NECK: No pain or stiffness  BREASTS: No pain, masses, or nipple discharge  RESPIRATORY: No cough, wheezing, chills or hemoptysis; No shortness of breath  CARDIOVASCULAR: No chest pain, palpitations, dizziness, or leg swelling  GASTROINTESTINAL: No abdominal or epigastric pain. No nausea, vomiting, or hematemesis; No diarrhea or constipation. No melena or hematochezia.  GENITOURINARY: No dysuria, frequency, hematuria, or incontinence  NEUROLOGICAL: No headaches, memory loss, loss of strength, numbness, or tremors  SKIN: No itching, burning, rashes, or lesions   LYMPH NODES: No enlarged glands  ENDOCRINE: No heat or cold intolerance; No hair loss  MUSCULOSKELETAL: No joint pain or swelling; No muscle, back, or extremity pain  PSYCHIATRIC: No depression, anxiety, mood swings, or difficulty sleeping  HEME/LYMPH: No easy bruising, or bleeding gums  ALLERY AND IMMUNOLOGIC: No hives or eczema  FAMILY HISTORY:    T(C): 35.9 (04-03-18 @ 05:36), Max: 36.7 (04-02-18 @ 22:52)  HR: 64 (04-03-18 @ 05:36) (63 - 79)  BP: 141/69 (04-03-18 @ 05:36) (133/62 - 141/69)  RR: 16 (04-03-18 @ 05:36) (16 - 18)  SpO2: --  Wt(kg): --Vital Signs Last 24 Hrs  T(C): 35.9 (03 Apr 2018 05:36), Max: 36.7 (02 Apr 2018 22:52)  T(F): 96.6 (03 Apr 2018 05:36), Max: 98 (02 Apr 2018 22:52)  HR: 64 (03 Apr 2018 05:36) (63 - 79)  BP: 141/69 (03 Apr 2018 05:36) (133/62 - 141/69)  BP(mean): --  RR: 16 (03 Apr 2018 05:36) (16 - 18)  SpO2: --    PHYSICAL EXAM:  ENMT-  white plaque on left lateral tongue   LUNGS: Clear to auscultation bilaterally   HEART: S1/S2 present. RRR.   ABD: Soft, non-tender, non-distended. Bowel sounds present  EXT: no p. edema  NEURO:  single word sentences.  follows commands.  severe aphasia.  Cranial nerves: EOMI w/o nystagmus.  PERRLA.  Right facial droop + . Facial sensation is normal with normal bite.  Motor examination:   spastic R hemiplegia.  LUE/LLE 5/5    Consultant(s) Notes Reviewed:  [x ] YES  [ ] NO  Care Discussed with Consultants/Other Providers [ x] YES  [ ] NO    LABS:                            14.5   10.31 )-----------( 228      ( 03 Apr 2018 06:25 )             42.3   04-02    138  |  95<L>  |  17  ----------------------------<  80  3.6   |  28  |  0.9    Ca    9.3      02 Apr 2018 07:07  Mg     1.8     04-02              amLODIPine   Tablet 10 milliGRAM(s) Oral daily  aspirin  chewable 81 milliGRAM(s) Oral daily  atorvastatin 40 milliGRAM(s) Oral at bedtime  baclofen 10 milliGRAM(s) Oral every 12 hours  enoxaparin Injectable 40 milliGRAM(s) SubCutaneous at bedtime  finasteride 5 milliGRAM(s) Oral daily  hydrochlorothiazide 25 milliGRAM(s) Oral daily  levETIRAcetam 1000 milliGRAM(s) Oral two times a day  losartan 100 milliGRAM(s) Oral daily  nystatin    Suspension [id number] Unit(s) Oral once  pantoprazole  Injectable 40 milliGRAM(s) IV Push two times a day  sodium chloride 0.9%. 1000 milliLiter(s) IV Continuous <Continuous>  traMADol 25 milliGRAM(s) Oral two times a day PRN      HEALTH ISSUES - PROBLEM Dx:  Hematemesis/vomiting blood: Hematemesis/vomiting blood  Hematemesis, presence of nausea not specified: Hematemesis, presence of nausea not specified  HTN (hypertension): HTN (hypertension)  Enlarged prostate: Enlarged prostate  High cholesterol: High cholesterol  Seizure: Seizure          Case Discussed with House Staff   45 minutes spent on total encounter; more than 50% of the visit was spent counseling and/or coordinating care by the attending physician.

## 2018-04-03 NOTE — PROGRESS NOTE ADULT - SUBJECTIVE AND OBJECTIVE BOX
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 As per wife pt was last seen without facial droop and tremors at 5am. No head injury or trauma. No fever or chills. No difficulty breathing. Tremor was constant of right arm and leg , without relieving or aggravating factors, associted with right facial droop ,  	  baseline bedbound, and constant right sided 0/5  since cva (31 Mar 2018 12:56)      PAST MEDICAL & SURGICAL HISTORY:  Right sided weakness  High cholesterol  Enlarged prostate  HTN (hypertension)  Hemorrhagic stroke          Vital Signs Last 24 Hrs  T(C): 35.9 (03 Apr 2018 05:36), Max: 36.7 (02 Apr 2018 22:52)  T(F): 96.6 (03 Apr 2018 05:36), Max: 98 (02 Apr 2018 22:52)  HR: 64 (03 Apr 2018 05:36) (63 - 79)  BP: 141/69 (03 Apr 2018 05:36) (133/62 - 141/69)  BP(mean): --  RR: 16 (03 Apr 2018 05:36) (16 - 18)  SpO2: --    Neurological Exam:   Mental status: Awake, alert and oriented. Follows commands.  severe expressive aphasia- perseverates one word only  Cranial nerves: Pupils equally round and reactive to light, visual fields full, no nystagmus, extraocular muscles intact, V1 through V3 intact bilaterally and symmetric, R NLF  Motor:  RHP.  L intact.  Sensation: grossly intact  Coordination: No dysmetria on finger-to-nose and heel-to-shin.  No dysdiadokinesia.  Reflexes: 3+R UE/LE, 2+ LUE/LE    amLODIPine   Tablet 10 milliGRAM(s) Oral daily  aspirin  chewable 81 milliGRAM(s) Oral daily  atorvastatin 40 milliGRAM(s) Oral at bedtime  baclofen 10 milliGRAM(s) Oral every 12 hours  enoxaparin Injectable 40 milliGRAM(s) SubCutaneous at bedtime  finasteride 5 milliGRAM(s) Oral daily  hydrochlorothiazide 25 milliGRAM(s) Oral daily  levETIRAcetam 1000 milliGRAM(s) Oral two times a day  losartan 100 milliGRAM(s) Oral daily  nystatin    Suspension 631085 Unit(s) Oral once  pantoprazole  Injectable 40 milliGRAM(s) IV Push two times a day  sodium chloride 0.9%. 1000 milliLiter(s) IV Continuous <Continuous>  traMADol 25 milliGRAM(s) Oral two times a day PRN    Labs:  CBC Full  -  ( 03 Apr 2018 06:25 )  WBC Count : 10.31 K/uL  Hemoglobin : 14.5 g/dL  Hematocrit : 42.3 %  Platelet Count - Automated : 228 K/uL  Mean Cell Volume : 88.5 fL  Mean Cell Hemoglobin : 30.3 pg  Mean Cell Hemoglobin Concentration : 34.3 g/dL  Auto Neutrophil # : x  Auto Lymphocyte # : x  Auto Monocyte # : x  Auto Eosinophil # : x  Auto Basophil # : x  Auto Neutrophil % : x  Auto Lymphocyte % : x  Auto Monocyte % : x  Auto Eosinophil % : x  Auto Basophil % : x    04-03    141  |  101  |  17  ----------------------------<  102<H>  3.7   |  28  |  1.0    Ca    8.7      03 Apr 2018 06:25  Mg     1.8     04-03

## 2018-04-04 ENCOUNTER — RESULT REVIEW (OUTPATIENT)
Age: 78
End: 2018-04-04

## 2018-04-04 LAB
ALBUMIN SERPL ELPH-MCNC: 3.4 G/DL — LOW (ref 3.5–5.2)
ALP SERPL-CCNC: 108 U/L — SIGNIFICANT CHANGE UP (ref 30–115)
ALT FLD-CCNC: 17 U/L — SIGNIFICANT CHANGE UP (ref 0–41)
ANION GAP SERPL CALC-SCNC: 14 MMOL/L — SIGNIFICANT CHANGE UP (ref 7–14)
AST SERPL-CCNC: 11 U/L — SIGNIFICANT CHANGE UP (ref 0–41)
BILIRUB SERPL-MCNC: 0.4 MG/DL — SIGNIFICANT CHANGE UP (ref 0.2–1.2)
BUN SERPL-MCNC: 15 MG/DL — SIGNIFICANT CHANGE UP (ref 10–20)
CALCIUM SERPL-MCNC: 8.3 MG/DL — LOW (ref 8.5–10.1)
CHLORIDE SERPL-SCNC: 100 MMOL/L — SIGNIFICANT CHANGE UP (ref 98–110)
CO2 SERPL-SCNC: 25 MMOL/L — SIGNIFICANT CHANGE UP (ref 17–32)
CREAT SERPL-MCNC: 1.1 MG/DL — SIGNIFICANT CHANGE UP (ref 0.7–1.5)
GLUCOSE SERPL-MCNC: 113 MG/DL — HIGH (ref 70–99)
HCT VFR BLD CALC: 41.8 % — LOW (ref 42–52)
HGB BLD-MCNC: 13.8 G/DL — LOW (ref 14–18)
MAGNESIUM SERPL-MCNC: 1.9 MG/DL — SIGNIFICANT CHANGE UP (ref 1.8–2.4)
MCHC RBC-ENTMCNC: 29.8 PG — SIGNIFICANT CHANGE UP (ref 27–31)
MCHC RBC-ENTMCNC: 33 G/DL — SIGNIFICANT CHANGE UP (ref 32–37)
MCV RBC AUTO: 90.3 FL — SIGNIFICANT CHANGE UP (ref 80–94)
NRBC # BLD: 0 /100 WBCS — SIGNIFICANT CHANGE UP (ref 0–0)
PLATELET # BLD AUTO: 257 K/UL — SIGNIFICANT CHANGE UP (ref 130–400)
POTASSIUM SERPL-MCNC: 3.6 MMOL/L — SIGNIFICANT CHANGE UP (ref 3.5–5)
POTASSIUM SERPL-SCNC: 3.6 MMOL/L — SIGNIFICANT CHANGE UP (ref 3.5–5)
PROT SERPL-MCNC: 5.4 G/DL — LOW (ref 6–8)
RBC # BLD: 4.63 M/UL — LOW (ref 4.7–6.1)
RBC # FLD: 14.2 % — SIGNIFICANT CHANGE UP (ref 11.5–14.5)
SODIUM SERPL-SCNC: 139 MMOL/L — SIGNIFICANT CHANGE UP (ref 135–146)
WBC # BLD: 13.57 K/UL — HIGH (ref 4.8–10.8)
WBC # FLD AUTO: 13.57 K/UL — HIGH (ref 4.8–10.8)

## 2018-04-04 RX ORDER — NYSTATIN 500MM UNIT
500000 POWDER (EA) MISCELLANEOUS ONCE
Qty: 0 | Refills: 0 | Status: COMPLETED | OUTPATIENT
Start: 2018-04-04 | End: 2018-04-04

## 2018-04-04 RX ORDER — LACTULOSE 10 G/15ML
30 SOLUTION ORAL ONCE
Qty: 0 | Refills: 0 | Status: COMPLETED | OUTPATIENT
Start: 2018-04-04 | End: 2018-04-04

## 2018-04-04 RX ADMIN — ENOXAPARIN SODIUM 40 MILLIGRAM(S): 100 INJECTION SUBCUTANEOUS at 21:55

## 2018-04-04 RX ADMIN — Medication 10 MILLIGRAM(S): at 05:07

## 2018-04-04 RX ADMIN — AMLODIPINE BESYLATE 10 MILLIGRAM(S): 2.5 TABLET ORAL at 05:07

## 2018-04-04 RX ADMIN — Medication 10 MILLIGRAM(S): at 18:22

## 2018-04-04 RX ADMIN — PANTOPRAZOLE SODIUM 40 MILLIGRAM(S): 20 TABLET, DELAYED RELEASE ORAL at 18:24

## 2018-04-04 RX ADMIN — LEVETIRACETAM 1000 MILLIGRAM(S): 250 TABLET, FILM COATED ORAL at 05:07

## 2018-04-04 RX ADMIN — LOSARTAN POTASSIUM 100 MILLIGRAM(S): 100 TABLET, FILM COATED ORAL at 05:07

## 2018-04-04 RX ADMIN — SODIUM CHLORIDE 50 MILLILITER(S): 9 INJECTION INTRAMUSCULAR; INTRAVENOUS; SUBCUTANEOUS at 11:31

## 2018-04-04 RX ADMIN — Medication 25 MILLIGRAM(S): at 05:07

## 2018-04-04 RX ADMIN — SODIUM CHLORIDE 50 MILLILITER(S): 9 INJECTION INTRAMUSCULAR; INTRAVENOUS; SUBCUTANEOUS at 01:10

## 2018-04-04 RX ADMIN — ATORVASTATIN CALCIUM 40 MILLIGRAM(S): 80 TABLET, FILM COATED ORAL at 21:55

## 2018-04-04 RX ADMIN — LACTULOSE 30 GRAM(S): 10 SOLUTION ORAL at 07:04

## 2018-04-04 RX ADMIN — Medication 200 MILLIGRAM(S): at 05:06

## 2018-04-04 RX ADMIN — FINASTERIDE 5 MILLIGRAM(S): 5 TABLET, FILM COATED ORAL at 12:57

## 2018-04-04 RX ADMIN — LEVETIRACETAM 1000 MILLIGRAM(S): 250 TABLET, FILM COATED ORAL at 18:22

## 2018-04-04 RX ADMIN — Medication 500000 UNIT(S): at 07:05

## 2018-04-04 RX ADMIN — PANTOPRAZOLE SODIUM 40 MILLIGRAM(S): 20 TABLET, DELAYED RELEASE ORAL at 05:07

## 2018-04-04 NOTE — PRE-ANESTHESIA EVALUATION ADULT - NSANTHOSAYNRD_GEN_A_CORE
No. EMMA screening performed.  STOP BANG Legend: 0-2 = LOW Risk; 3-4 = INTERMEDIATE Risk; 5-8 = HIGH Risk

## 2018-04-04 NOTE — PROGRESS NOTE ADULT - PROBLEM SELECTOR PLAN 1
increased keppra per neurology recommendations, will follow up neuro for need of eeg, Increased keppra 1g bid - per neurology recommendations.   Repeat EEG per Neuro follow report

## 2018-04-04 NOTE — PROGRESS NOTE ADULT - ASSESSMENT
78y male presented from home with constant tremors x 1 day releived  with ativan , history signfiicant for cva (hemorrhagic) with residual right hemiplegia and aphasia 	  DNR/DNI. Developed witnessed hematemasis while on ASA, Plavix for stroke with bilateral severe known carotid atherosclerosis. Well known to Neurology Group Dr. Higuera, Maryana Holden.  Currently awaiting EGD.  Case discussed in detail with wife at bedside.  All questions answered. Wife worried about 350$ co/pay per hospitalization day. Told her d/c will depend on EGD report and Neuro input on dual or non-dual anti-platelete therapy. Wife aggrees.

## 2018-04-04 NOTE — PROGRESS NOTE ADULT - SUBJECTIVE AND OBJECTIVE BOX
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 As per wife pt was last seen without facial droop and tremors at 5am. No head injury or trauma. No fever or chills. No difficulty breathing. Tremor was constant of right arm and leg , without relieving or aggravating factors, associted with right facial droop ,  	  baseline bedbound, and constant right sided 0/5  since cva (31 Mar 2018 12:56)      PAST MEDICAL & SURGICAL HISTORY:  Right sided weakness  High cholesterol  Enlarged prostate  HTN (hypertension)  Hemorrhagic stroke          Vital Signs Last 24 Hrs  T(C): 37.2 (04 Apr 2018 06:09), Max: 37.4 (03 Apr 2018 21:32)  T(F): 98.9 (04 Apr 2018 06:09), Max: 99.3 (03 Apr 2018 21:32)  HR: 71 (04 Apr 2018 06:09) (71 - 80)  BP: 149/68 (04 Apr 2018 06:09) (113/55 - 149/68)  BP(mean): --  RR: 16 (04 Apr 2018 06:09) (16 - 16)  SpO2: --    Neurological Exam:   Mental status: Awake, alert and oriented. Follows commands.  severe expressive aphasia- perseverates one word only  Cranial nerves: Pupils equally round and reactive to light, visual fields full, no nystagmus, extraocular muscles intact, V1 through V3 intact bilaterally and symmetric, R NLF    acetaminophen   Tablet. 650 milliGRAM(s) Oral every 6 hours PRN  amLODIPine   Tablet 10 milliGRAM(s) Oral daily  aspirin  chewable 81 milliGRAM(s) Oral daily  atorvastatin 40 milliGRAM(s) Oral at bedtime  baclofen 10 milliGRAM(s) Oral every 12 hours  enoxaparin Injectable 40 milliGRAM(s) SubCutaneous at bedtime  finasteride 5 milliGRAM(s) Oral daily  guaiFENesin    Syrup 200 milliGRAM(s) Oral every 6 hours PRN  hydrochlorothiazide 25 milliGRAM(s) Oral daily  levETIRAcetam 1000 milliGRAM(s) Oral two times a day  losartan 100 milliGRAM(s) Oral daily  pantoprazole  Injectable 40 milliGRAM(s) IV Push two times a day  sodium chloride 0.9%. 1000 milliLiter(s) IV Continuous <Continuous>  traMADol 25 milliGRAM(s) Oral two times a day PRN      Labs:  CBC Full  -  ( 04 Apr 2018 06:24 )  WBC Count : 13.57 K/uL  Hemoglobin : 13.8 g/dL  Hematocrit : 41.8 %  Platelet Count - Automated : 257 K/uL  Mean Cell Volume : 90.3 fL  Mean Cell Hemoglobin : 29.8 pg  Mean Cell Hemoglobin Concentration : 33.0 g/dL    4-04    139  |  100  |  15  ----------------------------<  113<H>  3.6   |  25  |  1.1    Ca    8.3<L>      04 Apr 2018 06:24  Mg     1.9     04-04    TPro  5.4<L>  /  Alb  3.4<L>  /  TBili  0.4  /  DBili  x   /  AST  11  /  ALT  17  /  AlkPhos  108  04-04    LIVER FUNCTIONS - ( 04 Apr 2018 06:24 )  Alb: 3.4 g/dL / Pro: 5.4 g/dL / ALK PHOS: 108 U/L / ALT: 17 U/L / AST: 11 U/L / GGT: x

## 2018-04-04 NOTE — PROGRESS NOTE ADULT - ASSESSMENT
This is a 78y Male with h/o hemorrhagic CVA w/ residual spastic RHP and expressive aphasia currently with suspected simple partial seizures with secondary generalization.     Plan:  - continue keppra 1000 mg BID  - repeat REEG  - if EEG improved, may d/c with outpt f/u in 2 - 4 wks

## 2018-04-04 NOTE — PROGRESS NOTE ADULT - SUBJECTIVE AND OBJECTIVE BOX
EGD performed. please refer to paper report in paper chart.   No adverse events.  No bleeding noted.  A fundic erosion was noted, likely secondary to retching, probable cause of his previous hematemesis. Currently in healing.    Rec: PPI PO daily.  Resume feeding.  Anticoagulants and antiplatelets could be used as well if indicated.

## 2018-04-04 NOTE — PROGRESS NOTE ADULT - PROBLEM SELECTOR PROBLEM 5
Hematemesis, presence of nausea not specified

## 2018-04-04 NOTE — PROGRESS NOTE ADULT - SUBJECTIVE AND OBJECTIVE BOX
YAHAIRA MONTIEL  78y  [facility]-[address] [place] [address] 1      Patient is a 78y old  Male who presents with a chief complaint of Right side tremors (31 Mar 2018 14:42)      INTERVAL HPI/OVERNIGHT EVENTS:    no events overnight, evlaluated by speech and swallow, on modified diet , awaiting egd scheduled for today. NPO    REVIEW OF SYSTEMS:  CONSTITUTIONAL: No fever, weight loss, or fatigue  EYES: No eye pain, visual disturbances, or discharge  ENMT:  No difficulty hearing, tinnitus, vertigo; No sinus or throat pain  NECK: No pain or stiffness  BREASTS: No pain, masses, or nipple discharge  RESPIRATORY: No cough, wheezing, chills or hemoptysis; No shortness of breath  CARDIOVASCULAR: No chest pain, palpitations, dizziness, or leg swelling  GASTROINTESTINAL: No abdominal or epigastric pain. No nausea, vomiting, or hematemesis; No diarrhea or constipation. No melena or hematochezia.  GENITOURINARY: No dysuria, frequency, hematuria, or incontinence  NEUROLOGICAL: No headaches, memory loss, loss of strength, numbness, or tremors  SKIN: No itching, burning, rashes, or lesions   LYMPH NODES: No enlarged glands  ENDOCRINE: No heat or cold intolerance; No hair loss  MUSCULOSKELETAL: No joint pain or swelling; No muscle, back, or extremity pain  PSYCHIATRIC: No depression, anxiety, mood swings, or difficulty sleeping  HEME/LYMPH: No easy bruising, or bleeding gums  ALLERY AND IMMUNOLOGIC: No hives or eczema  FAMILY HISTORY:    Vital Signs Last 24 Hrs  T(C): 37.2 (04 Apr 2018 06:09), Max: 37.4 (03 Apr 2018 21:32)  T(F): 98.9 (04 Apr 2018 06:09), Max: 99.3 (03 Apr 2018 21:32)  HR: 71 (04 Apr 2018 06:09) (71 - 80)  BP: 149/68 (04 Apr 2018 06:09) (113/55 - 149/68)  RR: 16 (04 Apr 2018 06:09) (16 - 16)    PHYSICAL EXAM:  ENMT-  white plaque on left lateral tongue   LUNGS: Clear to auscultation bilaterally   HEART: S1/S2 present. RRR.   ABD: Soft, non-tender, non-distended. Bowel sounds present  EXT: no pedal edema  NEURO:  single word sentences.  follows commands.  severe aphasia.  Cranial nerves: EOMI w/o nystagmus.  PERRLA.  Right facial droop + . Facial sensation is normal with normal bite.  Motor examination:   spastic R hemiplegia.  TABITHA/BRIDGETT 5/5    Consultant(s) Notes Reviewed:  [x ] YES  [ ] NO  Care Discussed with Consultants/Other Providers [ x] YES  [ ] NO    LABS:                          13.8   13.57 )-----------( 257      ( 04 Apr 2018 06:24 )             41.8                           14.5   10.31 )-----------( 228      ( 03 Apr 2018 06:25 )             42.3     04-04    139  |  100  |  15  ----------------------------<  113<H>  3.6   |  25  |  1.1    Ca    8.3<L>      04 Apr 2018 06:24  Mg     1.9     04-04    TPro  5.4<L>  /  Alb  3.4<L>  /  TBili  0.4  /  DBili  x   /  AST  11  /  ALT  17  /  AlkPhos  108  04-04    04-02    138  |  95<L>  |  17  ----------------------------<  80  3.6   |  28  |  0.9    Ca    9.3      02 Apr 2018 07:07  Mg     1.8     04-02    MEDICATIONS  (STANDING):  amLODIPine   Tablet 10 milliGRAM(s) Oral daily  aspirin  chewable 81 milliGRAM(s) Oral daily  atorvastatin 40 milliGRAM(s) Oral at bedtime  baclofen 10 milliGRAM(s) Oral every 12 hours  enoxaparin Injectable 40 milliGRAM(s) SubCutaneous at bedtime  finasteride 5 milliGRAM(s) Oral daily  hydrochlorothiazide 25 milliGRAM(s) Oral daily  levETIRAcetam 1000 milliGRAM(s) Oral two times a day  losartan 100 milliGRAM(s) Oral daily  pantoprazole  Injectable 40 milliGRAM(s) IV Push two times a day  sodium chloride 0.9%. 1000 milliLiter(s) (50 mL/Hr) IV Continuous <Continuous>    MEDICATIONS  (PRN):  acetaminophen   Tablet. 650 milliGRAM(s) Oral every 6 hours PRN Moderate Pain (4 - 6)  guaiFENesin    Syrup 200 milliGRAM(s) Oral every 6 hours PRN Cough  traMADol 25 milliGRAM(s) Oral two times a day PRN Mild Pain (1 - 3)    HEALTH ISSUES - PROBLEM Dx:  Hematemesis/vomiting blood: Hematemesis/vomiting blood  Hematemesis, presence of nausea not specified: Hematemesis, presence of nausea not specified  HTN (hypertension): HTN (hypertension)  Enlarged prostate: Enlarged prostate  High cholesterol: High cholesterol  Seizure: Seizure    Case Discussed with House Staff   45 minutes spent on total encounter; more than 50% of the visit was spent counseling and/or coordinating care by the attending physician.

## 2018-04-05 ENCOUNTER — TRANSCRIPTION ENCOUNTER (OUTPATIENT)
Age: 78
End: 2018-04-05

## 2018-04-05 VITALS
DIASTOLIC BLOOD PRESSURE: 56 MMHG | TEMPERATURE: 97 F | SYSTOLIC BLOOD PRESSURE: 120 MMHG | RESPIRATION RATE: 16 BRPM | HEART RATE: 78 BPM

## 2018-04-05 LAB
HCT VFR BLD CALC: 39.8 % — LOW (ref 42–52)
HGB BLD-MCNC: 13.4 G/DL — LOW (ref 14–18)
MCHC RBC-ENTMCNC: 30.3 PG — SIGNIFICANT CHANGE UP (ref 27–31)
MCHC RBC-ENTMCNC: 33.7 G/DL — SIGNIFICANT CHANGE UP (ref 32–37)
MCV RBC AUTO: 90 FL — SIGNIFICANT CHANGE UP (ref 80–94)
NRBC # BLD: 0 /100 WBCS — SIGNIFICANT CHANGE UP (ref 0–0)
PLATELET # BLD AUTO: 208 K/UL — SIGNIFICANT CHANGE UP (ref 130–400)
RBC # BLD: 4.42 M/UL — LOW (ref 4.7–6.1)
RBC # FLD: 14.1 % — SIGNIFICANT CHANGE UP (ref 11.5–14.5)
WBC # BLD: 12.3 K/UL — HIGH (ref 4.8–10.8)
WBC # FLD AUTO: 12.3 K/UL — HIGH (ref 4.8–10.8)

## 2018-04-05 RX ORDER — ASPIRIN/CALCIUM CARB/MAGNESIUM 324 MG
1 TABLET ORAL
Qty: 0 | Refills: 0 | COMMUNITY

## 2018-04-05 RX ORDER — PANTOPRAZOLE SODIUM 20 MG/1
1 TABLET, DELAYED RELEASE ORAL
Qty: 30 | Refills: 0 | OUTPATIENT
Start: 2018-04-05 | End: 2018-05-04

## 2018-04-05 RX ORDER — LEVETIRACETAM 250 MG/1
1 TABLET, FILM COATED ORAL
Qty: 60 | Refills: 0 | OUTPATIENT
Start: 2018-04-05 | End: 2018-05-04

## 2018-04-05 RX ORDER — BACLOFEN 100 %
0 POWDER (GRAM) MISCELLANEOUS
Qty: 0 | Refills: 0 | COMMUNITY

## 2018-04-05 RX ADMIN — Medication 10 MILLIGRAM(S): at 05:43

## 2018-04-05 RX ADMIN — PANTOPRAZOLE SODIUM 40 MILLIGRAM(S): 20 TABLET, DELAYED RELEASE ORAL at 05:44

## 2018-04-05 RX ADMIN — Medication 81 MILLIGRAM(S): at 13:01

## 2018-04-05 RX ADMIN — SODIUM CHLORIDE 50 MILLILITER(S): 9 INJECTION INTRAMUSCULAR; INTRAVENOUS; SUBCUTANEOUS at 05:47

## 2018-04-05 RX ADMIN — LEVETIRACETAM 1000 MILLIGRAM(S): 250 TABLET, FILM COATED ORAL at 05:43

## 2018-04-05 RX ADMIN — Medication 25 MILLIGRAM(S): at 05:43

## 2018-04-05 RX ADMIN — FINASTERIDE 5 MILLIGRAM(S): 5 TABLET, FILM COATED ORAL at 13:01

## 2018-04-05 RX ADMIN — Medication 10 MILLIGRAM(S): at 17:11

## 2018-04-05 RX ADMIN — LEVETIRACETAM 1000 MILLIGRAM(S): 250 TABLET, FILM COATED ORAL at 17:11

## 2018-04-05 RX ADMIN — LOSARTAN POTASSIUM 100 MILLIGRAM(S): 100 TABLET, FILM COATED ORAL at 05:43

## 2018-04-05 RX ADMIN — AMLODIPINE BESYLATE 10 MILLIGRAM(S): 2.5 TABLET ORAL at 05:43

## 2018-04-05 RX ADMIN — PANTOPRAZOLE SODIUM 40 MILLIGRAM(S): 20 TABLET, DELAYED RELEASE ORAL at 17:11

## 2018-04-05 NOTE — DISCHARGE NOTE ADULT - PATIENT PORTAL LINK FT
You can access the [facility], offered by [facility], by registering with the following website: [email or web address]

## 2018-04-05 NOTE — DISCHARGE NOTE ADULT - PLAN OF CARE
resolved This is due to Gastritis take protonix   stop prednisone and ASA stable follow with neurology   BP control statins losartan keppra fenesteride

## 2018-04-05 NOTE — DISCHARGE NOTE ADULT - MEDICATION SUMMARY - MEDICATIONS TO STOP TAKING
I will STOP taking the medications listed below when I get home from the hospital:    predniSONE    aspirin 81 mg oral tablet, chewable  -- 1 tab(s) by mouth once a day

## 2018-04-05 NOTE — DISCHARGE NOTE ADULT - SECONDARY DIAGNOSIS.
Cerebrovascular accident (CVA), unspecified mechanism High cholesterol Hypertension, unspecified type Seizure Enlarged prostate

## 2018-04-05 NOTE — DISCHARGE NOTE ADULT - CARE PLAN
Principal Discharge DX:	Hematemesis, presence of nausea not specified  Goal:	resolved  Assessment and plan of treatment:	This is due to Gastritis take protonix   stop prednisone and ASA  Secondary Diagnosis:	Cerebrovascular accident (CVA), unspecified mechanism  Goal:	stable  Assessment and plan of treatment:	follow with neurology   BP control  Secondary Diagnosis:	High cholesterol  Goal:	stable  Assessment and plan of treatment:	statins  Secondary Diagnosis:	Hypertension, unspecified type  Goal:	stable  Assessment and plan of treatment:	losartan  Secondary Diagnosis:	Seizure  Goal:	stable  Assessment and plan of treatment:	keppra  Secondary Diagnosis:	Enlarged prostate  Goal:	stable  Assessment and plan of treatment:	fenesteride

## 2018-04-05 NOTE — DISCHARGE NOTE ADULT - HOSPITAL COURSE
Pt admitted for UGI - hematemasis EGD found to have Gastritis and Alesia Garvin Tear biopsies done and pt needs f/u in GI clinic for results. Pt was as ASA, Plavix and Prednisone. ASA was stopped after discussion with Dr. Mijares. Unclear if pt has had stents or not but plavix continued. Prednisone was stopped. Pt treated with Protonix IV bid for duration of hospitalization. CBC remained stable no transfusions given. Pt seen by both GI and Neuro. Neuro did EEG which showed slowing brain waves and Dr. Mijares increased Keppra to 1g bid. Rx for pronix and keppra sent to pharmacy. pt ready for d/c nurse aware.

## 2018-04-05 NOTE — DISCHARGE NOTE ADULT - MEDICATION SUMMARY - MEDICATIONS TO TAKE
I will START or STAY ON the medications listed below when I get home from the hospital:    finasteride 5 mg oral tablet  -- 1 tab(s) by mouth once a day  -- Indication: For Enlarged prostate    losartan 100 mg oral tablet  -- 1 tab(s) by mouth once a day  -- Indication: For High blood pressure    levETIRAcetam 1000 mg oral tablet  -- 1 tab(s) by mouth 2 times a day  seizures  -- Indication: For Seizure    atorvastatin 40 mg oral tablet  -- 1 tab(s) by mouth once a day  -- Indication: For High cholesterol    clopidogrel 75 mg oral tablet  -- 1 tab(s) by mouth once a day  -- Indication: For CVA    amLODIPine 10 mg oral tablet  -- 1 tab(s) by mouth once a day  -- Indication: For HTN (hypertension)    hydroCHLOROthiazide 25 mg oral tablet  -- 1 tab(s) by mouth once a day  -- Indication: For HTN (hypertension)    baclofen 10 mg oral tablet  -- 1 tab(s) by mouth once a day  Spasms - stroke h/x  -- Indication: For CVA    Protonix 40 mg oral delayed release tablet  -- 1 tab(s) by mouth once a day   UGIB / Gastritis   -- It is very important that you take or use this exactly as directed.  Do not skip doses or discontinue unless directed by your doctor.  Obtain medical advice before taking any non-prescription drugs as some may affect the action of this medication.  Swallow whole.  Do not crush.    -- Indication: For Gastritis / Alesia Garvin Tear from vomitting

## 2018-04-05 NOTE — DISCHARGE NOTE ADULT - CARE PROVIDER_API CALL
Eber Mijares), Neurology; Neuromuscular Medicine  [address]  [place], [place] [id number]  Phone: [phone]  Fax: [phone]    Alonso Merrill), Gastroenterology; Internal Medicine  [address] [place] [id number]  Phone: [phone]  Fax: [phone]

## 2018-04-05 NOTE — DISCHARGE NOTE ADULT - CARE PROVIDERS DIRECT ADDRESSES
[email or web address]

## 2018-04-06 LAB — SURGICAL PATHOLOGY STUDY: SIGNIFICANT CHANGE UP

## 2018-04-10 DIAGNOSIS — I10 ESSENTIAL (PRIMARY) HYPERTENSION: ICD-10-CM

## 2018-04-10 DIAGNOSIS — I69.151 HEMIPLEGIA AND HEMIPARESIS FOLLOWING NONTRAUMATIC INTRACEREBRAL HEMORRHAGE AFFECTING RIGHT DOMINANT SIDE: ICD-10-CM

## 2018-04-10 DIAGNOSIS — E78.00 PURE HYPERCHOLESTEROLEMIA, UNSPECIFIED: ICD-10-CM

## 2018-04-10 DIAGNOSIS — Z66 DO NOT RESUSCITATE: ICD-10-CM

## 2018-04-10 DIAGNOSIS — I69.123: ICD-10-CM

## 2018-04-10 DIAGNOSIS — Z74.01 BED CONFINEMENT STATUS: ICD-10-CM

## 2018-04-10 DIAGNOSIS — K29.61 OTHER GASTRITIS WITH BLEEDING: ICD-10-CM

## 2018-04-10 DIAGNOSIS — G40.109 LOCALIZATION-RELATED (FOCAL) (PARTIAL) SYMPTOMATIC EPILEPSY AND EPILEPTIC SYNDROMES WITH SIMPLE PARTIAL SEIZURES, NOT INTRACTABLE, WITHOUT STATUS EPILEPTICUS: ICD-10-CM

## 2018-04-10 DIAGNOSIS — I69.120 APHASIA FOLLOWING NONTRAUMATIC INTRACEREBRAL HEMORRHAGE: ICD-10-CM

## 2018-04-10 DIAGNOSIS — I63.9 CEREBRAL INFARCTION, UNSPECIFIED: ICD-10-CM

## 2018-04-10 DIAGNOSIS — K29.60 OTHER GASTRITIS WITHOUT BLEEDING: ICD-10-CM

## 2018-04-10 DIAGNOSIS — N40.0 BENIGN PROSTATIC HYPERPLASIA WITHOUT LOWER URINARY TRACT SYMPTOMS: ICD-10-CM

## 2018-04-10 DIAGNOSIS — I69.192 FACIAL WEAKNESS FOLLOWING NONTRAUMATIC INTRACEREBRAL HEMORRHAGE: ICD-10-CM

## 2018-04-11 ENCOUNTER — TRANSCRIPTION ENCOUNTER (OUTPATIENT)
Age: 78
End: 2018-04-11

## 2018-09-13 ENCOUNTER — EMERGENCY (EMERGENCY)
Facility: HOSPITAL | Age: 78
LOS: 0 days | Discharge: HOME | End: 2018-09-13
Attending: EMERGENCY MEDICINE | Admitting: EMERGENCY MEDICINE

## 2018-09-13 VITALS
RESPIRATION RATE: 18 BRPM | SYSTOLIC BLOOD PRESSURE: 127 MMHG | HEIGHT: 64 IN | TEMPERATURE: 99 F | WEIGHT: 145.06 LBS | OXYGEN SATURATION: 97 % | DIASTOLIC BLOOD PRESSURE: 70 MMHG | HEART RATE: 84 BPM

## 2018-09-13 VITALS
OXYGEN SATURATION: 100 % | HEART RATE: 89 BPM | DIASTOLIC BLOOD PRESSURE: 68 MMHG | RESPIRATION RATE: 18 BRPM | SYSTOLIC BLOOD PRESSURE: 133 MMHG

## 2018-09-13 DIAGNOSIS — I10 ESSENTIAL (PRIMARY) HYPERTENSION: ICD-10-CM

## 2018-09-13 DIAGNOSIS — Z79.899 OTHER LONG TERM (CURRENT) DRUG THERAPY: ICD-10-CM

## 2018-09-13 DIAGNOSIS — Z88.0 ALLERGY STATUS TO PENICILLIN: ICD-10-CM

## 2018-09-13 DIAGNOSIS — E78.00 PURE HYPERCHOLESTEROLEMIA, UNSPECIFIED: ICD-10-CM

## 2018-09-13 DIAGNOSIS — H26.9 UNSPECIFIED CATARACT: Chronic | ICD-10-CM

## 2018-09-13 DIAGNOSIS — R05 COUGH: ICD-10-CM

## 2018-09-13 DIAGNOSIS — N40.0 BENIGN PROSTATIC HYPERPLASIA WITHOUT LOWER URINARY TRACT SYMPTOMS: ICD-10-CM

## 2018-09-13 PROBLEM — I61.9 NONTRAUMATIC INTRACEREBRAL HEMORRHAGE, UNSPECIFIED: Chronic | Status: ACTIVE | Noted: 2018-03-31

## 2018-09-13 PROBLEM — R53.1 WEAKNESS: Chronic | Status: ACTIVE | Noted: 2018-03-31

## 2018-09-13 LAB
ALBUMIN SERPL ELPH-MCNC: 3.8 G/DL — SIGNIFICANT CHANGE UP (ref 3.5–5.2)
ALP SERPL-CCNC: 94 U/L — SIGNIFICANT CHANGE UP (ref 30–115)
ALT FLD-CCNC: 14 U/L — SIGNIFICANT CHANGE UP (ref 0–41)
ANION GAP SERPL CALC-SCNC: 15 MMOL/L — HIGH (ref 7–14)
AST SERPL-CCNC: 14 U/L — SIGNIFICANT CHANGE UP (ref 0–41)
BASE EXCESS BLDV CALC-SCNC: 3.7 MMOL/L — HIGH (ref -2–2)
BILIRUB SERPL-MCNC: 0.5 MG/DL — SIGNIFICANT CHANGE UP (ref 0.2–1.2)
BUN SERPL-MCNC: 30 MG/DL — HIGH (ref 10–20)
CA-I SERPL-SCNC: 1.23 MMOL/L — SIGNIFICANT CHANGE UP (ref 1.12–1.3)
CALCIUM SERPL-MCNC: 9.3 MG/DL — SIGNIFICANT CHANGE UP (ref 8.5–10.1)
CHLORIDE SERPL-SCNC: 99 MMOL/L — SIGNIFICANT CHANGE UP (ref 98–110)
CO2 SERPL-SCNC: 26 MMOL/L — SIGNIFICANT CHANGE UP (ref 17–32)
CREAT SERPL-MCNC: 1.3 MG/DL — SIGNIFICANT CHANGE UP (ref 0.7–1.5)
GAS PNL BLDV: 140 MMOL/L — SIGNIFICANT CHANGE UP (ref 136–145)
GAS PNL BLDV: SIGNIFICANT CHANGE UP
GLUCOSE SERPL-MCNC: 114 MG/DL — HIGH (ref 70–99)
HCO3 BLDV-SCNC: 30 MMOL/L — HIGH (ref 22–29)
HCT VFR BLD CALC: 43.5 % — SIGNIFICANT CHANGE UP (ref 42–52)
HCT VFR BLDA CALC: 45.8 % — HIGH (ref 34–44)
HGB BLD CALC-MCNC: 14.9 G/DL — SIGNIFICANT CHANGE UP (ref 14–18)
HGB BLD-MCNC: 14.3 G/DL — SIGNIFICANT CHANGE UP (ref 14–18)
HOROWITZ INDEX BLDV+IHG-RTO: 21 — SIGNIFICANT CHANGE UP
LACTATE BLDV-MCNC: 1.7 MMOL/L — HIGH (ref 0.5–1.6)
LACTATE SERPL-SCNC: <0.2 MMOL/L — LOW (ref 0.5–2.2)
MCHC RBC-ENTMCNC: 29.5 PG — SIGNIFICANT CHANGE UP (ref 27–31)
MCHC RBC-ENTMCNC: 32.9 G/DL — SIGNIFICANT CHANGE UP (ref 32–37)
MCV RBC AUTO: 89.9 FL — SIGNIFICANT CHANGE UP (ref 80–94)
NRBC # BLD: 0 /100 WBCS — SIGNIFICANT CHANGE UP (ref 0–0)
PCO2 BLDV: 51 MMHG — SIGNIFICANT CHANGE UP (ref 41–51)
PH BLDV: 7.38 — SIGNIFICANT CHANGE UP (ref 7.26–7.43)
PLATELET # BLD AUTO: 343 K/UL — SIGNIFICANT CHANGE UP (ref 130–400)
PO2 BLDV: 32 MMHG — SIGNIFICANT CHANGE UP (ref 20–40)
POTASSIUM BLDV-SCNC: 3.2 MMOL/L — LOW (ref 3.3–5.6)
POTASSIUM SERPL-MCNC: 3.8 MMOL/L — SIGNIFICANT CHANGE UP (ref 3.5–5)
POTASSIUM SERPL-SCNC: 3.8 MMOL/L — SIGNIFICANT CHANGE UP (ref 3.5–5)
PROT SERPL-MCNC: 6.5 G/DL — SIGNIFICANT CHANGE UP (ref 6–8)
RBC # BLD: 4.84 M/UL — SIGNIFICANT CHANGE UP (ref 4.7–6.1)
RBC # FLD: 13 % — SIGNIFICANT CHANGE UP (ref 11.5–14.5)
SAO2 % BLDV: 58 % — SIGNIFICANT CHANGE UP
SODIUM SERPL-SCNC: 140 MMOL/L — SIGNIFICANT CHANGE UP (ref 135–146)
WBC # BLD: 8.7 K/UL — SIGNIFICANT CHANGE UP (ref 4.8–10.8)
WBC # FLD AUTO: 8.7 K/UL — SIGNIFICANT CHANGE UP (ref 4.8–10.8)

## 2018-09-13 RX ORDER — IPRATROPIUM/ALBUTEROL SULFATE 18-103MCG
3 AEROSOL WITH ADAPTER (GRAM) INHALATION ONCE
Qty: 0 | Refills: 0 | Status: COMPLETED | OUTPATIENT
Start: 2018-09-13 | End: 2018-09-13

## 2018-09-13 RX ORDER — ALBUTEROL 90 UG/1
1 AEROSOL, METERED ORAL ONCE
Qty: 0 | Refills: 0 | Status: DISCONTINUED | OUTPATIENT
Start: 2018-09-13 | End: 2018-09-13

## 2018-09-13 RX ORDER — SODIUM CHLORIDE 9 MG/ML
1000 INJECTION INTRAMUSCULAR; INTRAVENOUS; SUBCUTANEOUS ONCE
Qty: 0 | Refills: 0 | Status: COMPLETED | OUTPATIENT
Start: 2018-09-13 | End: 2018-09-13

## 2018-09-13 RX ORDER — ALBUTEROL 90 UG/1
2.5 AEROSOL, METERED ORAL ONCE
Qty: 0 | Refills: 0 | Status: DISCONTINUED | OUTPATIENT
Start: 2018-09-13 | End: 2018-09-13

## 2018-09-13 RX ADMIN — Medication 3 MILLILITER(S): at 15:25

## 2018-09-13 RX ADMIN — SODIUM CHLORIDE 1000 MILLILITER(S): 9 INJECTION INTRAMUSCULAR; INTRAVENOUS; SUBCUTANEOUS at 16:15

## 2018-09-13 RX ADMIN — SODIUM CHLORIDE 1000 MILLILITER(S): 9 INJECTION INTRAMUSCULAR; INTRAVENOUS; SUBCUTANEOUS at 13:19

## 2018-09-13 NOTE — ED PROVIDER NOTE - PROGRESS NOTE DETAILS
Discussed lab results with patient and family, still awaiting radiology formal read. Discussed with patient and family plan for nebulizer treatment and possible discharge

## 2018-09-13 NOTE — ED PROVIDER NOTE - MEDICAL DECISION MAKING DETAILS
Pt with persistent cough despite zpak, worse at night, on exam vital signs appreciated, nontoxic appearing with prolonged exp phase sl diminished right base, no w/r/r, cor reg abd snt neuro with right hemiparesis/aphasia, labs and studies reviewed, given neb with improved air entry and decreased cough, will d/c on albuterol, tessalon, and levaquin to f/u with pmd. Patient counseled regarding conditions which should prompt return.

## 2018-09-13 NOTE — ED ADULT NURSE NOTE - NSIMPLEMENTINTERV_GEN_ALL_ED
Implemented All Universal Safety Interventions:  [place] to call system. Call bell, personal items and telephone within reach. Instruct patient to call for assistance. Room bathroom lighting operational. Non-slip footwear when patient is off stretcher. Physically safe environment: no spills, clutter or unnecessary equipment. Stretcher in lowest position, wheels locked, appropriate side rails in place.

## 2018-09-13 NOTE — ED ADULT NURSE NOTE - NSFALLRSKASSESASSIST_ED_ALL_ED
no
What Is The Reason For Today's Visit?: Full Body Skin Examination
What Is The Reason For Today's Visit? (Being Monitored For X): concerning skin lesions on an annual basis

## 2018-09-13 NOTE — ED PROVIDER NOTE - PHYSICAL EXAMINATION
Physical Exam    Vital Signs: I have reviewed the initial vital signs.  Constitutional: well-nourished, appears stated age, no acute distress seated in wheelchair. Limited facial movement. Only answers to yes and no  Eyes: Conjunctiva pink, Sclera clear, PERRLA, EOMI.  Cardiovascular: S1 and S2, regular rate, regular rhythm, well-perfused extremities, radial pulses equal and 2+  Respiratory: unlabored respiratory effort, crackles at both bases and course lung sounds bilaterally  Gastrointestinal: soft, non-tender abdomen, no pulsatile mass, normal bowl sounds  Musculoskeletal: supple neck, no lower extremity edema, no midline tenderness  Integumentary: warm, dry, no rash  Neurologic: awake, alert, hemiplegia of upper and lower left side.   Psychiatric: appropriate mood, appropriate affect

## 2018-09-13 NOTE — ED PROVIDER NOTE - OBJECTIVE STATEMENT
78 year old male with history of hemiplegic stroke presenting with cough x 7 days. Patient was treated by PMD for pneumonia with Azithromycin and completed treatment on Tuesday. His has recurrent pneumonia with his last admission being in June. The cough has been progressing and antibiotics have not helped.  He admits to congestion. Denies fever, shortness of breath, chest pain, abdominal pain, nausea, vomiting, diarrhea., headache. Patient's wife states patient is at baseline.

## 2018-10-18 ENCOUNTER — INPATIENT (INPATIENT)
Facility: HOSPITAL | Age: 78
LOS: 6 days | Discharge: HOME HEALTH | End: 2018-10-25
Attending: INTERNAL MEDICINE | Admitting: INTERNAL MEDICINE
Payer: MEDICARE

## 2018-10-18 VITALS
OXYGEN SATURATION: 96 % | HEART RATE: 71 BPM | DIASTOLIC BLOOD PRESSURE: 65 MMHG | TEMPERATURE: 96 F | RESPIRATION RATE: 17 BRPM | SYSTOLIC BLOOD PRESSURE: 135 MMHG | WEIGHT: 157.63 LBS

## 2018-10-18 DIAGNOSIS — H26.9 UNSPECIFIED CATARACT: Chronic | ICD-10-CM

## 2018-10-18 LAB
ALBUMIN SERPL ELPH-MCNC: 3.9 G/DL — SIGNIFICANT CHANGE UP (ref 3.5–5.2)
ALP SERPL-CCNC: 95 U/L — SIGNIFICANT CHANGE UP (ref 30–115)
ALT FLD-CCNC: 15 U/L — SIGNIFICANT CHANGE UP (ref 0–41)
ANION GAP SERPL CALC-SCNC: 16 MMOL/L — HIGH (ref 7–14)
APTT BLD: 34.4 SEC — SIGNIFICANT CHANGE UP (ref 27–39.2)
AST SERPL-CCNC: 15 U/L — SIGNIFICANT CHANGE UP (ref 0–41)
BASOPHILS # BLD AUTO: 0.06 K/UL — SIGNIFICANT CHANGE UP (ref 0–0.2)
BASOPHILS NFR BLD AUTO: 0.6 % — SIGNIFICANT CHANGE UP (ref 0–1)
BILIRUB SERPL-MCNC: 0.4 MG/DL — SIGNIFICANT CHANGE UP (ref 0.2–1.2)
BUN SERPL-MCNC: 31 MG/DL — HIGH (ref 10–20)
CALCIUM SERPL-MCNC: 9.3 MG/DL — SIGNIFICANT CHANGE UP (ref 8.5–10.1)
CHLORIDE SERPL-SCNC: 100 MMOL/L — SIGNIFICANT CHANGE UP (ref 98–110)
CK MB CFR SERPL CALC: 1.2 NG/ML — SIGNIFICANT CHANGE UP (ref 0.6–6.3)
CK SERPL-CCNC: 63 U/L — SIGNIFICANT CHANGE UP (ref 0–225)
CO2 SERPL-SCNC: 25 MMOL/L — SIGNIFICANT CHANGE UP (ref 17–32)
CREAT SERPL-MCNC: 1.3 MG/DL — SIGNIFICANT CHANGE UP (ref 0.7–1.5)
EOSINOPHIL # BLD AUTO: 0.38 K/UL — SIGNIFICANT CHANGE UP (ref 0–0.7)
EOSINOPHIL NFR BLD AUTO: 3.8 % — SIGNIFICANT CHANGE UP (ref 0–8)
GLUCOSE SERPL-MCNC: 84 MG/DL — SIGNIFICANT CHANGE UP (ref 70–99)
HCT VFR BLD CALC: 41 % — LOW (ref 42–52)
HGB BLD-MCNC: 13.3 G/DL — LOW (ref 14–18)
IMM GRANULOCYTES NFR BLD AUTO: 0.6 % — HIGH (ref 0.1–0.3)
INR BLD: 1.18 RATIO — SIGNIFICANT CHANGE UP (ref 0.65–1.3)
LYMPHOCYTES # BLD AUTO: 1.7 K/UL — SIGNIFICANT CHANGE UP (ref 1.2–3.4)
LYMPHOCYTES # BLD AUTO: 17 % — LOW (ref 20.5–51.1)
MCHC RBC-ENTMCNC: 29.4 PG — SIGNIFICANT CHANGE UP (ref 27–31)
MCHC RBC-ENTMCNC: 32.4 G/DL — SIGNIFICANT CHANGE UP (ref 32–37)
MCV RBC AUTO: 90.5 FL — SIGNIFICANT CHANGE UP (ref 80–94)
MONOCYTES # BLD AUTO: 1.23 K/UL — HIGH (ref 0.1–0.6)
MONOCYTES NFR BLD AUTO: 12.3 % — HIGH (ref 1.7–9.3)
NEUTROPHILS # BLD AUTO: 6.56 K/UL — HIGH (ref 1.4–6.5)
NEUTROPHILS NFR BLD AUTO: 65.7 % — SIGNIFICANT CHANGE UP (ref 42.2–75.2)
NRBC # BLD: 0 /100 WBCS — SIGNIFICANT CHANGE UP (ref 0–0)
PLATELET # BLD AUTO: 384 K/UL — SIGNIFICANT CHANGE UP (ref 130–400)
POTASSIUM SERPL-MCNC: 4.3 MMOL/L — SIGNIFICANT CHANGE UP (ref 3.5–5)
POTASSIUM SERPL-SCNC: 4.3 MMOL/L — SIGNIFICANT CHANGE UP (ref 3.5–5)
PROT SERPL-MCNC: 6.7 G/DL — SIGNIFICANT CHANGE UP (ref 6–8)
PROTHROM AB SERPL-ACNC: 12.8 SEC — SIGNIFICANT CHANGE UP (ref 9.95–12.87)
RBC # BLD: 4.53 M/UL — LOW (ref 4.7–6.1)
RBC # FLD: 13.8 % — SIGNIFICANT CHANGE UP (ref 11.5–14.5)
SODIUM SERPL-SCNC: 141 MMOL/L — SIGNIFICANT CHANGE UP (ref 135–146)
TROPONIN T SERPL-MCNC: <0.01 NG/ML — SIGNIFICANT CHANGE UP
WBC # BLD: 9.99 K/UL — SIGNIFICANT CHANGE UP (ref 4.8–10.8)
WBC # FLD AUTO: 9.99 K/UL — SIGNIFICANT CHANGE UP (ref 4.8–10.8)

## 2018-10-18 RX ORDER — ENOXAPARIN SODIUM 100 MG/ML
40 INJECTION SUBCUTANEOUS DAILY
Qty: 0 | Refills: 0 | Status: DISCONTINUED | OUTPATIENT
Start: 2018-10-18 | End: 2018-10-23

## 2018-10-18 RX ORDER — BACLOFEN 100 %
10 POWDER (GRAM) MISCELLANEOUS DAILY
Qty: 0 | Refills: 0 | Status: DISCONTINUED | OUTPATIENT
Start: 2018-10-18 | End: 2018-10-19

## 2018-10-18 RX ORDER — ASPIRIN/CALCIUM CARB/MAGNESIUM 324 MG
325 TABLET ORAL ONCE
Qty: 0 | Refills: 0 | Status: DISCONTINUED | OUTPATIENT
Start: 2018-10-18 | End: 2018-10-18

## 2018-10-18 RX ORDER — PANTOPRAZOLE SODIUM 20 MG/1
40 TABLET, DELAYED RELEASE ORAL
Qty: 0 | Refills: 0 | Status: DISCONTINUED | OUTPATIENT
Start: 2018-10-18 | End: 2018-10-24

## 2018-10-18 RX ORDER — FINASTERIDE 5 MG/1
1 TABLET, FILM COATED ORAL
Qty: 0 | Refills: 0 | COMMUNITY

## 2018-10-18 RX ORDER — BACLOFEN 100 %
1 POWDER (GRAM) MISCELLANEOUS
Qty: 0 | Refills: 0 | COMMUNITY

## 2018-10-18 RX ORDER — LEVETIRACETAM 250 MG/1
1000 TABLET, FILM COATED ORAL
Qty: 0 | Refills: 0 | Status: DISCONTINUED | OUTPATIENT
Start: 2018-10-18 | End: 2018-10-24

## 2018-10-18 RX ORDER — ATORVASTATIN CALCIUM 80 MG/1
1 TABLET, FILM COATED ORAL
Qty: 0 | Refills: 0 | COMMUNITY

## 2018-10-18 RX ORDER — ATORVASTATIN CALCIUM 80 MG/1
80 TABLET, FILM COATED ORAL AT BEDTIME
Qty: 0 | Refills: 0 | Status: DISCONTINUED | OUTPATIENT
Start: 2018-10-18 | End: 2018-10-24

## 2018-10-18 RX ORDER — ASPIRIN AND DIPYRIDAMOLE 25; 200 MG/1; MG/1
1 CAPSULE, EXTENDED RELEASE ORAL
Qty: 0 | Refills: 0 | COMMUNITY

## 2018-10-18 RX ORDER — ASPIRIN AND DIPYRIDAMOLE 25; 200 MG/1; MG/1
1 CAPSULE, EXTENDED RELEASE ORAL
Qty: 0 | Refills: 0 | Status: DISCONTINUED | OUTPATIENT
Start: 2018-10-18 | End: 2018-10-23

## 2018-10-18 RX ORDER — ASPIRIN/CALCIUM CARB/MAGNESIUM 324 MG
200 TABLET ORAL ONCE
Qty: 0 | Refills: 0 | Status: DISCONTINUED | OUTPATIENT
Start: 2018-10-18 | End: 2018-10-18

## 2018-10-18 RX ORDER — AMLODIPINE BESYLATE 2.5 MG/1
1 TABLET ORAL
Qty: 0 | Refills: 0 | COMMUNITY

## 2018-10-18 RX ORDER — LOSARTAN POTASSIUM 100 MG/1
100 TABLET, FILM COATED ORAL DAILY
Qty: 0 | Refills: 0 | Status: DISCONTINUED | OUTPATIENT
Start: 2018-10-18 | End: 2018-10-24

## 2018-10-18 RX ORDER — LOSARTAN POTASSIUM 100 MG/1
1 TABLET, FILM COATED ORAL
Qty: 0 | Refills: 0 | COMMUNITY

## 2018-10-18 RX ORDER — FINASTERIDE 5 MG/1
5 TABLET, FILM COATED ORAL DAILY
Qty: 0 | Refills: 0 | Status: DISCONTINUED | OUTPATIENT
Start: 2018-10-18 | End: 2018-10-24

## 2018-10-18 RX ORDER — AMLODIPINE BESYLATE 2.5 MG/1
10 TABLET ORAL DAILY
Qty: 0 | Refills: 0 | Status: DISCONTINUED | OUTPATIENT
Start: 2018-10-18 | End: 2018-10-24

## 2018-10-18 RX ORDER — ASPIRIN/CALCIUM CARB/MAGNESIUM 324 MG
81 TABLET ORAL DAILY
Qty: 0 | Refills: 0 | Status: DISCONTINUED | OUTPATIENT
Start: 2018-10-18 | End: 2018-10-18

## 2018-10-18 RX ADMIN — PANTOPRAZOLE SODIUM 40 MILLIGRAM(S): 20 TABLET, DELAYED RELEASE ORAL at 18:04

## 2018-10-18 RX ADMIN — LEVETIRACETAM 1000 MILLIGRAM(S): 250 TABLET, FILM COATED ORAL at 18:03

## 2018-10-18 RX ADMIN — ENOXAPARIN SODIUM 40 MILLIGRAM(S): 100 INJECTION SUBCUTANEOUS at 18:03

## 2018-10-18 RX ADMIN — ATORVASTATIN CALCIUM 80 MILLIGRAM(S): 80 TABLET, FILM COATED ORAL at 22:07

## 2018-10-18 NOTE — H&P ADULT - ASSESSMENT
78y Male with h/o hemorrhagic CVA w/ residual spastic RHP and expressive aphasia presented to ED for c/o Right eye vision loss    #Vision loss -   cth reported negative  ASA 81 , lipitor 80 mg   neuro recs  PT/rehab    #htn  #hld  #dvt ppx  #code status- 78y Male with h/o hemorrhagic CVA w/ residual spastic RHP and expressive aphasia presented to ED for c/o Right eye vision loss    #Vision loss -   cth reported negative  ASA 81 , lipitor 80 mg   neuro recs  f/u tsh , lipid profile   Opthalmology eval   speech and swallow analysis   PT/rehab    #htn-stable   #hld-  #dvt ppx -  #code status- 78y Male with h/o hemorrhagic CVA w/ residual spastic RHP and expressive aphasia presented to ED for c/o Right eye vision loss    #Vision loss -   cth reported negative  ASA 81 , lipitor 80 mg   neuro recs  f/u tsh , lipid profile   Opthalmology eval   speech and swallow analysis   PT/rehab    #htn-stable   #hld-stable, f/u lipid profile  #dvt ppx -lovenox  #code status- full code, from home, lives with wife

## 2018-10-18 NOTE — H&P ADULT - HISTORY OF PRESENT ILLNESS
78y Male with h/o hemorrhagic CVA w/ residual spastic RHP and expressive aphasia presented to ED for c/o   baseline bedbound, and constant right sided 0/5  since cva 78y Male with h/o hemorrhagic CVA w/ residual spastic RHP and expressive aphasia presented to ED for c/o Right eye vision loss  As per family , patient suddenly developed Right eye vision loss , painless this morning of presentation. He kept on pointing at the right eye. Denies fever, chills, headache, nausea, vomiting .  baseline bedbound, and constant right sided 0/5  since cva

## 2018-10-18 NOTE — ED ADULT TRIAGE NOTE - CHIEF COMPLAINT QUOTE
Patient was brought in by family members, as per family members since 6 am this am he has complete loss of vision in his right eye, patient has a history of stroke with right sided paralysis and expressive aphasia, but family states his vision has been intact till today. FS/ 93. MD Quevedo at bedside examining patient

## 2018-10-18 NOTE — ED PROVIDER NOTE - OBJECTIVE STATEMENT
79y/o M 79y/o M w/ hx of 2 strokes in the past with baseline aphasic and r. sided paralysis neuro- elsherrieff, hx of htn, cholesterol on aspirin and plavix presents for vision changes.  pt woke up at 5am normal; then at 6am he started complaining of vision changes to right-- pt unable to express (which is baseline as per wife) however points at r. eye. no eythemema or discahrge from eye.

## 2018-10-18 NOTE — H&P ADULT - ATTENDING COMMENTS
Patient seen and evaluated independently medical resident note reviewed, I agree with plan and management, except as I have noted.

## 2018-10-18 NOTE — ED ADULT NURSE NOTE - INTERVENTIONS DEFINITIONS
Provide visual clues: red socks/Stretcher in lowest position, wheels locked, appropriate side rails in place/Provide visual cue, wrist band, yellow gown, etc./Monitor gait and stability/[place] to call system/Instruct patient to call for assistance/Monitor for mental status changes and reorient to person, place, and time/Call bell, personal items and telephone within reach/Non-slip footwear when patient is off stretcher

## 2018-10-18 NOTE — ED PROVIDER NOTE - PHYSICAL EXAMINATION
VITAL SIGNS: I have reviewed nursing notes and confirm.  CONSTITUTIONAL: Well-developed; well-nourished; in no acute distress  SKIN: skin exam is warm and dry, no acute rash.   HEAD: Normocephalic; atraumatic.  EYES:  EOM intact; conjunctiva and sclera clear.  ENT: No nasal discharge; airway clear. moist oral mucosa;    NECK: Supple; non tender.  CARD: S1, S2 normal; no murmurs, gallops, or rubs. Regular rate and rhythm. posterior tibial and radial pulses 2+  RESP: No wheezes, rales or rhonchi. cta b/l. no use of accessory muscles. no retractions  ABD: Normal bowel sounds; soft; non-distended; non-tender; no rebound.   EXT: Normal ROM. No  cyanosis or edema..  NEURO: baseline r. sided weakness as per family. aphasia at baseline as per family.  pt saying "no" to questions of vision changes-- family says that means "yes"

## 2018-10-18 NOTE — ED ADULT NURSE NOTE - NSFALLRSKINDICATORS_ED_ALL_ED
no conducted a detailed discussion... I had a detailed discussion with the patient and/or guardian regarding the historical points, exam findings, and any diagnostic results supporting the discharge/admit diagnosis.

## 2018-10-18 NOTE — H&P ADULT - NSHPLABSRESULTS_GEN_ALL_CORE
LABS:                        13.3   9.99  )-----------( 384      ( 18 Oct 2018 12:52 )             41.0     18 Oct 2018 12:52    141    |  100    |  31     ----------------------------<  84     4.3     |  25     |  1.3      Ca    9.3        18 Oct 2018 12:52    TPro  6.7    /  Alb  3.9    /  TBili  0.4    /  DBili  x      /  AST  15     /  ALT  15     /  AlkPhos  95     18 Oct 2018 12:52    PT/INR - ( 18 Oct 2018 12:52 )   PT: 12.80 sec;   INR: 1.18 ratio         PTT - ( 18 Oct 2018 12:52 )  PTT:34.4 sec    < from: CT Head No Cont (10.18.18 @ 13:00) >    1.  No evidence of acute intracranial hemorrhage or large territory   infarct. Stable exam since 3/31/2018.    2.  Stable severe chronic microvascular and chronic infarcts.    3.  Stable ventricular enlargement.    < end of copied text >

## 2018-10-18 NOTE — H&P ADULT - NSHPREVIEWOFSYSTEMS_GEN_ALL_CORE
REVIEW OF SYSTEMS:    CONSTITUTIONAL:   EYES/ENT:   NECK:   RESPIRATORY:   CARDIOVASCULAR:   GASTROINTESTINAL:   GENITOURINARY:   NEUROLOGICAL:   SKIN: REVIEW OF SYSTEMS:    CONSTITUTIONAL: NAD, lying in bed  EYES/ENT: Decreased vision right eye  NECK: no pain  RESPIRATORY: denies sob, cough   CARDIOVASCULAR: no chest pain or palpitations  GASTROINTESTINAL: no abdominal pain, nausea, vomiting, diarrhea  GENITOURINARY: no dysuria   NEUROLOGICAL: aphasia+ , decreased high vision right ; Right hemiplegia

## 2018-10-18 NOTE — ED ADULT NURSE REASSESSMENT NOTE - NS ED NURSE REASSESS COMMENT FT1
pt has right weakness to arm and leg, aphasic, this is baseline, woke up with vision loss to right eye, pt is alert and responsive.

## 2018-10-18 NOTE — ED PROVIDER NOTE - PROGRESS NOTE DETAILS
stroke code called--- IV access obtained-- now being wheel to CT Spoke to Dr. Best-- recommends aspirin, change atorvastatin 80 mg.  admit to CCU for neuro checks. CT negative.  bedside ultrasound of eye shows no signs of retinal detachment.

## 2018-10-18 NOTE — ED PROVIDER NOTE - ATTENDING CONTRIBUTION TO CARE
Pt with above PMH bib family for apparent acute visual loss since this am along with more prominent right facial droop which pt experiences intermittently since stroke, pt with baseline dense hemiplegia, unchanged, and aphasia limiting history but seems to have no light perception OD (answers no for yes and vice versa but not consistent), perrla, +response to visual threat, rest of PE as above, stroke code activated, d/w neuro, no tpa, admit monitored setting for q4 neuro checks

## 2018-10-18 NOTE — H&P ADULT - NSHPPHYSICALEXAM_GEN_ALL_CORE
General: NAD  HEENT: No icterus,. Moist mucous membranes , pinpoint b/l pupil but equal and reactive   Neck: No JVD noted. Supple  Cardio: S1, S2 noted, RRR. No murmurs, rubs or gallops  Resp: Clear to auscultation b/l. No adventitious sounds  Abdo: Soft, NT, bowel sounds present. No organomegaly  Extremities: No edema noted. Pulses present b/l  Neuro: awake and alert, follows simple commands . Decreased vision Right eye . RUE AND RLE 0/5 ; LUE AND LLE 5/5    Skin: Dry, no rashes

## 2018-10-19 LAB
ALBUMIN SERPL ELPH-MCNC: 3.7 G/DL — SIGNIFICANT CHANGE UP (ref 3.5–5.2)
ALP SERPL-CCNC: 92 U/L — SIGNIFICANT CHANGE UP (ref 30–115)
ALT FLD-CCNC: 13 U/L — SIGNIFICANT CHANGE UP (ref 0–41)
ANION GAP SERPL CALC-SCNC: 14 MMOL/L — SIGNIFICANT CHANGE UP (ref 7–14)
AST SERPL-CCNC: 9 U/L — SIGNIFICANT CHANGE UP (ref 0–41)
BASOPHILS # BLD AUTO: 0.05 K/UL — SIGNIFICANT CHANGE UP (ref 0–0.2)
BASOPHILS NFR BLD AUTO: 0.5 % — SIGNIFICANT CHANGE UP (ref 0–1)
BILIRUB SERPL-MCNC: 0.4 MG/DL — SIGNIFICANT CHANGE UP (ref 0.2–1.2)
BUN SERPL-MCNC: 26 MG/DL — HIGH (ref 10–20)
CALCIUM SERPL-MCNC: 9 MG/DL — SIGNIFICANT CHANGE UP (ref 8.5–10.1)
CHLORIDE SERPL-SCNC: 100 MMOL/L — SIGNIFICANT CHANGE UP (ref 98–110)
CHOLEST SERPL-MCNC: 118 MG/DL — SIGNIFICANT CHANGE UP (ref 100–200)
CO2 SERPL-SCNC: 26 MMOL/L — SIGNIFICANT CHANGE UP (ref 17–32)
CREAT SERPL-MCNC: 1 MG/DL — SIGNIFICANT CHANGE UP (ref 0.7–1.5)
EOSINOPHIL # BLD AUTO: 0.3 K/UL — SIGNIFICANT CHANGE UP (ref 0–0.7)
EOSINOPHIL NFR BLD AUTO: 3 % — SIGNIFICANT CHANGE UP (ref 0–8)
ERYTHROCYTE [SEDIMENTATION RATE] IN BLOOD: 66 MM/HR — HIGH (ref 0–10)
ESTIMATED AVERAGE GLUCOSE: 120 MG/DL — HIGH (ref 68–114)
GLUCOSE SERPL-MCNC: 92 MG/DL — SIGNIFICANT CHANGE UP (ref 70–99)
HBA1C BLD-MCNC: 5.8 % — HIGH (ref 4–5.6)
HCT VFR BLD CALC: 39.1 % — LOW (ref 42–52)
HDLC SERPL-MCNC: 26 MG/DL — LOW
HGB BLD-MCNC: 12.8 G/DL — LOW (ref 14–18)
IMM GRANULOCYTES NFR BLD AUTO: 0.4 % — HIGH (ref 0.1–0.3)
LIPID PNL WITH DIRECT LDL SERPL: 73 MG/DL — SIGNIFICANT CHANGE UP (ref 4–129)
LYMPHOCYTES # BLD AUTO: 1.29 K/UL — SIGNIFICANT CHANGE UP (ref 1.2–3.4)
LYMPHOCYTES # BLD AUTO: 13 % — LOW (ref 20.5–51.1)
MCHC RBC-ENTMCNC: 29.4 PG — SIGNIFICANT CHANGE UP (ref 27–31)
MCHC RBC-ENTMCNC: 32.7 G/DL — SIGNIFICANT CHANGE UP (ref 32–37)
MCV RBC AUTO: 89.7 FL — SIGNIFICANT CHANGE UP (ref 80–94)
MONOCYTES # BLD AUTO: 1.04 K/UL — HIGH (ref 0.1–0.6)
MONOCYTES NFR BLD AUTO: 10.5 % — HIGH (ref 1.7–9.3)
NEUTROPHILS # BLD AUTO: 7.18 K/UL — HIGH (ref 1.4–6.5)
NEUTROPHILS NFR BLD AUTO: 72.6 % — SIGNIFICANT CHANGE UP (ref 42.2–75.2)
PLATELET # BLD AUTO: 348 K/UL — SIGNIFICANT CHANGE UP (ref 130–400)
POTASSIUM SERPL-MCNC: 4.1 MMOL/L — SIGNIFICANT CHANGE UP (ref 3.5–5)
POTASSIUM SERPL-SCNC: 4.1 MMOL/L — SIGNIFICANT CHANGE UP (ref 3.5–5)
PROT SERPL-MCNC: 6.1 G/DL — SIGNIFICANT CHANGE UP (ref 6–8)
RBC # BLD: 4.36 M/UL — LOW (ref 4.7–6.1)
RBC # FLD: 13.5 % — SIGNIFICANT CHANGE UP (ref 11.5–14.5)
SODIUM SERPL-SCNC: 140 MMOL/L — SIGNIFICANT CHANGE UP (ref 135–146)
TOTAL CHOLESTEROL/HDL RATIO MEASUREMENT: 4.5 RATIO — SIGNIFICANT CHANGE UP (ref 4–5.5)
TRIGL SERPL-MCNC: 113 MG/DL — SIGNIFICANT CHANGE UP (ref 10–149)
TSH SERPL-MCNC: 2.86 UIU/ML — SIGNIFICANT CHANGE UP (ref 0.27–4.2)
WBC # BLD: 9.9 K/UL — SIGNIFICANT CHANGE UP (ref 4.8–10.8)
WBC # FLD AUTO: 9.9 K/UL — SIGNIFICANT CHANGE UP (ref 4.8–10.8)

## 2018-10-19 RX ORDER — BACLOFEN 100 %
10 POWDER (GRAM) MISCELLANEOUS EVERY 8 HOURS
Qty: 0 | Refills: 0 | Status: DISCONTINUED | OUTPATIENT
Start: 2018-10-19 | End: 2018-10-24

## 2018-10-19 RX ORDER — HYDROCHLOROTHIAZIDE 25 MG
25 TABLET ORAL DAILY
Qty: 0 | Refills: 0 | Status: DISCONTINUED | OUTPATIENT
Start: 2018-10-19 | End: 2018-10-24

## 2018-10-19 RX ADMIN — LOSARTAN POTASSIUM 100 MILLIGRAM(S): 100 TABLET, FILM COATED ORAL at 05:54

## 2018-10-19 RX ADMIN — ASPIRIN AND DIPYRIDAMOLE 1 CAPSULE(S): 25; 200 CAPSULE, EXTENDED RELEASE ORAL at 17:52

## 2018-10-19 RX ADMIN — AMLODIPINE BESYLATE 10 MILLIGRAM(S): 2.5 TABLET ORAL at 05:54

## 2018-10-19 RX ADMIN — Medication 116 MILLIGRAM(S): at 15:46

## 2018-10-19 RX ADMIN — LEVETIRACETAM 1000 MILLIGRAM(S): 250 TABLET, FILM COATED ORAL at 17:53

## 2018-10-19 RX ADMIN — ENOXAPARIN SODIUM 40 MILLIGRAM(S): 100 INJECTION SUBCUTANEOUS at 11:17

## 2018-10-19 RX ADMIN — LEVETIRACETAM 1000 MILLIGRAM(S): 250 TABLET, FILM COATED ORAL at 05:54

## 2018-10-19 RX ADMIN — FINASTERIDE 5 MILLIGRAM(S): 5 TABLET, FILM COATED ORAL at 11:17

## 2018-10-19 RX ADMIN — ATORVASTATIN CALCIUM 80 MILLIGRAM(S): 80 TABLET, FILM COATED ORAL at 22:07

## 2018-10-19 RX ADMIN — PANTOPRAZOLE SODIUM 40 MILLIGRAM(S): 20 TABLET, DELAYED RELEASE ORAL at 07:43

## 2018-10-19 RX ADMIN — ASPIRIN AND DIPYRIDAMOLE 1 CAPSULE(S): 25; 200 CAPSULE, EXTENDED RELEASE ORAL at 05:54

## 2018-10-19 RX ADMIN — Medication 10 MILLIGRAM(S): at 17:54

## 2018-10-19 NOTE — CONSULT NOTE ADULT - ASSESSMENT
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

## 2018-10-19 NOTE — PHYSICAL THERAPY INITIAL EVALUATION ADULT - GAIT DEVIATIONS NOTED, PT EVAL
increased time in double stance/decreased velocity of limb motion/decreased weight-shifting ability/decreased sowmya

## 2018-10-19 NOTE — PHYSICAL THERAPY INITIAL EVALUATION ADULT - IMPAIRED TRANSFERS: SIT/STAND, REHAB EVAL
narrow base of support/decreased endurance/impaired postural control/impaired motor control/decreased strength/impaired balance

## 2018-10-19 NOTE — CONSULT NOTE ADULT - SUBJECTIVE AND OBJECTIVE BOX
Neurology Consultation note    Name  YAHAIRA MONTIEL    HPI:  78y Male with h/o hemorrhagic CVA w/ residual spastic RHP and expressive aphasia presented to ED for c/o Right eye vision loss  As per family , patient suddenly developed Right eye vision loss , painless this morning of presentation. He kept on pointing at the right eye. Denies fever, chills, headache, nausea, vomiting .  baseline bedbound, and constant right sided 0/5  since cva (18 Oct 2018 15:28)      NEURO: 79 YO MAN WITH HX AS ABOVE P/W ACUTE VISION LOSS   TPA WAS NOT ADMINISTERED ON ADM AS PATIENT WAS OUT OF WINDOW  PATIENT IS STABLE TODAY WITH A NEW R FIELD CUT  HE FOLLOWS WITH DR DAVID VARGAS AS AN OUTPATIENT FOR HIS PRIOR CVA AND IS ON AGGRENOX  PATIENT ALSO ON KEPPRA FOR LOCALIZATION RELATED EPILEPSY    Vital Signs Last 24 Hrs  T(C): 36.4 (19 Oct 2018 07:01), Max: 36.4 (19 Oct 2018 07:01)  T(F): 97.6 (19 Oct 2018 07:01), Max: 97.6 (19 Oct 2018 07:01)  HR: 78 (19 Oct 2018 07:03) (63 - 84)  BP: 136/57 (19 Oct 2018 07:03) (134/63 - 145/65)  BP(mean): 82 (19 Oct 2018 07:03) (82 - 91)  RR: 20 (19 Oct 2018 07:01) (17 - 20)  SpO2: 93% (19 Oct 2018 07:03) (93% - 97%)    Neurological Exam:   Mental status: Awake, alert, CAN SAY ONE WORD ANSWERS BUT O/W WITH SIGNIFICANT EXPRESSIVE APHASIA, COMPREHENSION INTACT  Cranial nerves: Pupils equally round and reactive to light, R HH, no nystagmus, extraocular muscles intact, V1 through V3 intact bilaterally and symmetric, face symmetric, hearing intact to finger rub, palate elevation symmetric, tongue was midline.  Motor:  SPASTIC R HEMIPLEGIA  Sensation: Intact to light touch, proprioception, and pinprick.   Coordination: No dysmetria on finger-to-nose and heel-to-shin.  No dysdiadokinesia.  Reflexes: 2+ in bilateral UE/LE, downgoing toes bilaterally. (-) Stout.  Gait: Narrow and steady. No ataxia.  Romberg negative    Medications  amLODIPine   Tablet 10 milliGRAM(s) Oral daily  atorvastatin 80 milliGRAM(s) Oral at bedtime  baclofen 10 milliGRAM(s) Oral every 8 hours PRN  dipyridamole 200 mG/aspirin 25 mG 1 Capsule(s) Oral two times a day  enoxaparin Injectable 40 milliGRAM(s) SubCutaneous daily  finasteride 5 milliGRAM(s) Oral daily  hydrochlorothiazide 25 milliGRAM(s) Oral daily  levETIRAcetam 1000 milliGRAM(s) Oral two times a day  losartan 100 milliGRAM(s) Oral daily  pantoprazole    Tablet 40 milliGRAM(s) Oral before breakfast      Lab  10-19    140  |  100  |  26<H>  ----------------------------<  92  4.1   |  26  |  1.0    Ca    9.0      19 Oct 2018 05:22    TPro  6.1  /  Alb  3.7  /  TBili  0.4  /  DBili  x   /  AST  9   /  ALT  13  /  AlkPhos  92  10-19                          12.8   9.90  )-----------( 348      ( 19 Oct 2018 05:22 )             39.1     LIVER FUNCTIONS - ( 19 Oct 2018 05:22 )  Alb: 3.7 g/dL / Pro: 6.1 g/dL / ALK PHOS: 92 U/L / ALT: 13 U/L / AST: 9 U/L / GGT: x                   Radiology  NO ACUTE CHANGES  OLD CVA NOTED AND SEVERE MICROVASC CHANGES      Assessment:  79 YO MAN WITH THE AFOREMENTIONED HX P/W NEW RIGHT HOMONYMOUS HEMIANOPIA  ADMIT FOR CVA W/U    Plan:   NEURO CHECK Q 4 HRS  MRI BRAIN/MRA HEAD AND NECK  CONT AGGRENOX FOR NOW  INCREASE LIPITOR TO 80 MG QD  WILL FOLLOW  PLEASE CALL WITH ANY QUESTIONS Neurology Consultation note    Name  YAHAIRA MONTIEL    HPI:  78y Male with h/o hemorrhagic CVA w/ residual spastic RHP and expressive aphasia presented to ED for c/o Right eye vision loss  As per family , patient suddenly developed Right eye vision loss , painless this morning of presentation. He kept on pointing at the right eye. Denies fever, chills, headache, nausea, vomiting .  baseline bedbound, and constant right sided 0/5  since cva (18 Oct 2018 15:28)      NEURO: 77 YO MAN WITH HX AS ABOVE P/W ACUTE VISION LOSS   TPA WAS NOT ADMINISTERED ON ADM AS PATIENT WAS OUT OF WINDOW  PATIENT IS STABLE TODAY WITH A NEW R FIELD CUT  HE FOLLOWS WITH DR DAVID VARGAS AS AN OUTPATIENT FOR HIS PRIOR CVA AND IS ON AGGRENOX  PATIENT ALSO ON KEPPRA FOR LOCALIZATION RELATED EPILEPSY    Vital Signs Last 24 Hrs  T(C): 36.4 (19 Oct 2018 07:01), Max: 36.4 (19 Oct 2018 07:01)  T(F): 97.6 (19 Oct 2018 07:01), Max: 97.6 (19 Oct 2018 07:01)  HR: 78 (19 Oct 2018 07:03) (63 - 84)  BP: 136/57 (19 Oct 2018 07:03) (134/63 - 145/65)  BP(mean): 82 (19 Oct 2018 07:03) (82 - 91)  RR: 20 (19 Oct 2018 07:01) (17 - 20)  SpO2: 93% (19 Oct 2018 07:03) (93% - 97%)    Neurological Exam:   Mental status: Awake, alert, CAN SAY ONE WORD ANSWERS BUT O/W WITH SIGNIFICANT EXPRESSIVE APHASIA, COMPREHENSION INTACT  Cranial nerves: Pupils equally round and reactive to light, R HH, no nystagmus, extraocular muscles intact, V1 through V3 intact bilaterally and symmetric, face symmetric, hearing intact to finger rub, palate elevation symmetric, tongue was midline.  Motor:  SPASTIC R HEMIPLEGIA  Sensation: Intact to light touch, proprioception, and pinprick.   Coordination: No dysmetria on finger-to-nose and heel-to-shin.  No dysdiadokinesia.  Reflexes: 2+ in bilateral UE/LE, downgoing toes bilaterally. (-) Stout.  Gait: Narrow and steady. No ataxia.  Romberg negative    Medications  amLODIPine   Tablet 10 milliGRAM(s) Oral daily  atorvastatin 80 milliGRAM(s) Oral at bedtime  baclofen 10 milliGRAM(s) Oral every 8 hours PRN  dipyridamole 200 mG/aspirin 25 mG 1 Capsule(s) Oral two times a day  enoxaparin Injectable 40 milliGRAM(s) SubCutaneous daily  finasteride 5 milliGRAM(s) Oral daily  hydrochlorothiazide 25 milliGRAM(s) Oral daily  levETIRAcetam 1000 milliGRAM(s) Oral two times a day  losartan 100 milliGRAM(s) Oral daily  pantoprazole    Tablet 40 milliGRAM(s) Oral before breakfast      Lab  10-19    140  |  100  |  26<H>  ----------------------------<  92  4.1   |  26  |  1.0    Ca    9.0      19 Oct 2018 05:22    TPro  6.1  /  Alb  3.7  /  TBili  0.4  /  DBili  x   /  AST  9   /  ALT  13  /  AlkPhos  92  10-19                          12.8   9.90  )-----------( 348      ( 19 Oct 2018 05:22 )             39.1     LIVER FUNCTIONS - ( 19 Oct 2018 05:22 )  Alb: 3.7 g/dL / Pro: 6.1 g/dL / ALK PHOS: 92 U/L / ALT: 13 U/L / AST: 9 U/L / GGT: x                   Radiology  NO ACUTE CHANGES  OLD CVA NOTED AND SEVERE MICROVASC CHANGES      Assessment:  77 YO MAN WITH THE AFOREMENTIONED HX P/W NEW RIGHT HOMONYMOUS HEMIANOPIA  ADMIT FOR CVA W/U    Plan:   NEURO CHECK Q 4 HRS  MRI BRAIN/MRA HEAD AND NECK  CONT AGGRENOX FOR NOW  INCREASE LIPITOR TO 80 MG QD  cont keppra  WILL FOLLOW  PLEASE CALL WITH ANY QUESTIONS

## 2018-10-19 NOTE — CONSULT NOTE ADULT - SUBJECTIVE AND OBJECTIVE BOX
YAHAIRA MONTIEL    Male    Patient is a 78y old  Male who presents with a chief complaint of stroke (19 Oct 2018 08:17)      HPI:  78y Male with h/o hemorrhagic CVA w/ residual spastic RHP and expressive aphasia presented to ED for c/o Right eye vision loss  As per family , patient suddenly developed Right eye vision loss , painless this morning of presentation. He kept on pointing at the right eye. Denies fever, chills, headache, nausea, vomiting .  baseline bedbound, and constant right sided 0/5  since cva (18 Oct 2018 15:28)      Allergies    penicillins (Unknown)    Intolerances        Daily Height in cm: 162.56 (18 Oct 2018 16:13)    Daily     I&O's Summary    18 Oct 2018 07:01  -  19 Oct 2018 07:00  --------------------------------------------------------  IN: 120 mL / OUT: 0 mL / NET: 120 mL        FAMILY HISTORY:  No pertinent family history in first degree relatives        Marital Status:  ( x  )    (   ) Single    (   )    (  )   Lives with: (  ) alone  (  ) children   ( x ) spouse   (  ) parents  (  ) other  Recent Travel: no    Substance Use (street drugs): (  ) never used  (  ) other:  Tobacco Usage:  (   ) never smoked   (x   ) former smoker   (   ) current smoker  (     ) pack year  Alcohol Usage:        HEALTH ISSUES - PROBLEM Dx:          Vital Signs Last 24 Hrs  T(C): 36.4 (19 Oct 2018 07:01), Max: 36.4 (19 Oct 2018 07:01)  T(F): 97.6 (19 Oct 2018 07:01), Max: 97.6 (19 Oct 2018 07:01)  HR: 78 (19 Oct 2018 07:03) (62 - 84)  BP: 136/57 (19 Oct 2018 07:03) (134/63 - 153/74)  BP(mean): 82 (19 Oct 2018 07:03) (82 - 91)  RR: 20 (19 Oct 2018 07:01) (17 - 20)  SpO2: 93% (19 Oct 2018 07:03) (93% - 97%)    REVIEW OF SYSTEMS:  CONSTITUTIONAL: No fever, weight loss, or fatigue  EYES: No eye pain, ++visual disturbances, or discharge  NECK: No pain or stiffness  RESPIRATORY: No cough, wheezing, chills or hemoptysis; No shortness of breath  CARDIOVASCULAR: No chest pain, palpitations, dizziness, or leg swelling  GASTROINTESTINAL: No abdominal or epigastric pain. No nausea, vomiting, or hematemesis; No diarrhea or constipation. No melena or hematochezia.  GENITOURINARY: No dysuria, frequency, hematuria, or incontinence  NEUROLOGICAL: No headaches, memory loss, chronic NM issues   MUSCULOSKELETAL: No joint pain or swelling; No muscle, back, or extremity pain        PT/INR - ( 18 Oct 2018 12:52 )   PT: 12.80 sec;   INR: 1.18 ratio         PTT - ( 18 Oct 2018 12:52 )  PTT:34.4 sec                          12.8   9.90  )-----------( 348      ( 19 Oct 2018 05:22 )             39.1       10-19    140  |  100  |  26<H>  ----------------------------<  92  4.1   |  26  |  1.0    Ca    9.0      19 Oct 2018 05:22    TPro  6.1  /  Alb  3.7  /  TBili  0.4  /  DBili  x   /  AST  9   /  ALT  13  /  AlkPhos  92  10-19      CARDIAC MARKERS ( 18 Oct 2018 12:52 )  x     / <0.01 ng/mL / 63 U/L / x     / 1.2 ng/mL        LIVER FUNCTIONS - ( 19 Oct 2018 05:22 )  Alb: 3.7 g/dL / Pro: 6.1 g/dL / ALK PHOS: 92 U/L / ALT: 13 U/L / AST: 9 U/L / GGT: x                 CAPILLARY BLOOD GLUCOSE      POCT Blood Glucose.: 93 mg/dL (18 Oct 2018 12:36)                  Radiology: Radiology personally reviewed.  CT HEAD IMPRESSION:     1.  No evidence of acute intracranial hemorrhage or large territory   infarct. Stable exam since 3/31/2018.    2.  Stable severe chronic microvascular and chronic infarcts.    3.  Stable ventricular enlargement.      MEDICATIONS  (STANDING):  amLODIPine   Tablet 10 milliGRAM(s) Oral daily  atorvastatin 80 milliGRAM(s) Oral at bedtime  dipyridamole 200 mG/aspirin 25 mG 1 Capsule(s) Oral two times a day  enoxaparin Injectable 40 milliGRAM(s) SubCutaneous daily  finasteride 5 milliGRAM(s) Oral daily  hydrochlorothiazide 25 milliGRAM(s) Oral daily  levETIRAcetam 1000 milliGRAM(s) Oral two times a day  losartan 100 milliGRAM(s) Oral daily  pantoprazole    Tablet 40 milliGRAM(s) Oral before breakfast    MEDICATIONS  (PRN):  baclofen 10 milliGRAM(s) Oral every 8 hours PRN Muscle Spasm      Prescriptions:  Levaquin 750 mg oral tablet: 1 tab(s) orally once a day  (18 Oct 2018 15:34)  levETIRAcetam 1000 mg oral tablet: 1 tab(s) orally 2 times a day  seizures (18 Oct 2018 15:34)  Protonix 40 mg oral delayed release tablet: 1 tab(s) orally once a day   UGIB / Gastritis  (18 Oct 2018 15:34)  Tessalon Perles 100 mg oral capsule: 1 cap(s) orally every 8 hours  (18 Oct 2018 15:34)        PHYSICAL EXAM:  GENERAL: NAD, well-groomed,   HEAD:  Atraumatic, Normocephalic  EYES: EOMI, PERRLA, conjunctiva and sclera clear  ENMT: No tonsillar erythema, exudates, or enlargement; Moist mucous membranes, Good dentition, No lesions  NECK: Supple, No JVD, Normal thyroid  NERVOUS SYSTEM:  Alert & Oriented X3,  aphasic  CHEST/LUNG: Clear to percussion bilaterally; No rales, rhonchi, wheezing, or rubs  HEART: Regular rate and rhythm; No murmurs, rubs, or gallops  ABDOMEN: Soft, Nontender, Nondistended; Bowel sounds present  EXTREMITIES:   No clubbing, cyanosis, or edema, full ROM  LYMPH: No lymphadenopathy noted in cervical or supraclavicular area  SKIN: No rashes or lesions observed

## 2018-10-19 NOTE — CHART NOTE - NSCHARTNOTEFT_GEN_A_CORE
discussed case with ophthalmologist at bedside and as there is concern for temporal arteritis recommended solumedrol initiation.  Solumedrol 1000 mg/day will be admin today and for the next 3 days f/b 1 mg/kg starting later  mri brain nc , mri head NC ordered  MRa orbit , face, neck with/without contrast ordered    for the mri , patient will be transferred to [facility] tomorrow.

## 2018-10-19 NOTE — PHYSICAL THERAPY INITIAL EVALUATION ADULT - IMPAIRMENTS CONTRIBUTING TO GAIT DEVIATIONS, PT EVAL
impaired balance/narrow base of support/impaired postural control/decreased endurance/impaired motor control/decreased strength

## 2018-10-19 NOTE — PHYSICAL THERAPY INITIAL EVALUATION ADULT - GAIT DISTANCE, PT EVAL
2 small steps in place with complete assistance to advance RLE and prevent from bucking during stance

## 2018-10-19 NOTE — PHYSICAL THERAPY INITIAL EVALUATION ADULT - PATIENT/FAMILY/SIGNIFICANT OTHER GOALS STATEMENT, PT EVAL
Patient's wife would like for patient to continue to be able to stand and walk a little bit with help and cane

## 2018-10-19 NOTE — CONSULT NOTE ADULT - ASSESSMENT
IMPRESSION: Rehab of 77 y/o  m rehab  for CVA rt HP     PRECAUTIONS: [  ] Cardiac  [  ] Respiratory  [  ] Seizures [  ] Contact Isolation  [  ] Droplet Isolation  [  fall] Other    Weight Bearing Status:     RECOMMENDATION:    Out of Bed to Chair     DVT/Decubiti Prophylaxis    REHAB PLAN:     [xx  ] Bedside P/T 3-5 times a week   [   ]   Bedside O/T  2-3 times a week             [   ] No Rehab Therapy Indicated                   [   ]  Speech Therapy   Conditioning/ROM                                    ADL  Bed Mobility                                               Conditioning/ROM  Transfers                                                     Bed Mobility  Sitting /Standing Balance                         Transfers                                        Gait Training                                               Sitting/Standing Balance  Stair Training [   ]Applicable                    Home equipment Eval                                                                        Splinting  [   ] Only      GOALS:   ADL   [   ]   Independent                    Transfers  [   ] Independent                          Ambulation  [   ] Independent     [    ] With device                            [   ]  CG                                                         [   ]  CG                                                                  [   ] CG                            [    ] Min A                                                   [   ] Min A                                                              [   ] Min  A          DISCHARGE PLAN:   [   ]  Good candidate for Intensive Rehabilitation/Hospital based-4A [facility]                                             Will tolerate 3hrs Intensive Rehab Daily                                       [    ]  Short Term Rehab in Skilled Nursing Facility                                       [  xx  ]  Home with Outpatient or VN services pt/  ot  home care d/w  wife  who  refused  STR                                          [    ]  Possible Candidate for Intensive Hospital based Rehab

## 2018-10-19 NOTE — CONSULT NOTE ADULT - SUBJECTIVE AND OBJECTIVE BOX
HPI: 77 yo  Male with h/o hemorrhagic CVA w/ residual spastic RHP and expressive aphasia presented to ED for c/o Right eye vision loss  As per family , patient suddenly developed Right eye vision loss , painless this morning of presentation. He kept on pointing at the right eye. Denies fever, chills, headache, nausea, vomiting .  baseline bedbound, and constant right sided 0/5  since cva   ptn known  to  me  fro str in snf     PTN  REFERRED TO ACUTE  REHAB  FOR  EVAL AND  TX   PAST MEDICAL & SURGICAL HISTORY:  Right sided weakness  High cholesterol  Enlarged prostate  HTN (hypertension)  Hemorrhagic stroke  Bilateral cataracts      Hospital Course:    TODAY'S SUBJECTIVE & REVIEW OF SYMPTOMS:     Constitutional WNL   Cardio WNL   Resp WNL   GI WNL  Heme WNL  Endo WNL  Skin WNL  MSK WNL  Neuro WNL  Cognitive WNL  Psych WNL      MEDICATIONS  (STANDING):  amLODIPine   Tablet 10 milliGRAM(s) Oral daily  atorvastatin 80 milliGRAM(s) Oral at bedtime  dipyridamole 200 mG/aspirin 25 mG 1 Capsule(s) Oral two times a day  enoxaparin Injectable 40 milliGRAM(s) SubCutaneous daily  finasteride 5 milliGRAM(s) Oral daily  hydrochlorothiazide 25 milliGRAM(s) Oral daily  levETIRAcetam 1000 milliGRAM(s) Oral two times a day  losartan 100 milliGRAM(s) Oral daily  methylPREDNISolone sodium succinate IVPB 1000 milliGRAM(s) IV Intermittent daily  pantoprazole    Tablet 40 milliGRAM(s) Oral before breakfast    MEDICATIONS  (PRN):  baclofen 10 milliGRAM(s) Oral every 8 hours PRN Muscle Spasm      FAMILY HISTORY:  No pertinent family history in first degree relatives      Allergies    penicillins (Unknown)    Intolerances        SOCIAL HISTORY:    [  ] Etoh  [  ] Smoking  [  ] Substance abuse     Home Environment:  [  ] Home Alone  [ xx ] Lives with Family wife    [  ] Home Health Aid    Dwelling:  [  ] Apartment  [x  ] Private House  [  ] Adult Home  [  ] Skilled Nursing Facility      [  ] Short Term  [  ] Long Term  [x ] Stairs       Elevator [  ]    FUNCTIONAL STATUS PTA: (Check all that apply)  Ambulation: [   ]Independent    [  x] Dependent     [  ] Non-Ambulatory  Assistive Device: [  ] SA Cane  [  x]  Q Cane  [  ] Walker  [  x]  Wheelchair  ADL : [  ] Independent  [ x ]  Dependent       Vital Signs Last 24 Hrs  T(C): 35.1 (19 Oct 2018 15:25), Max: 36.4 (19 Oct 2018 07:01)  T(F): 95.1 (19 Oct 2018 15:25), Max: 97.6 (19 Oct 2018 07:01)  HR: 86 (19 Oct 2018 15:25) (70 - 86)  BP: 176/72 (19 Oct 2018 15:25) (134/63 - 176/72)  BP(mean): 82 (19 Oct 2018 07:03) (82 - 91)  RR: 20 (19 Oct 2018 15:25) (20 - 20)  SpO2: 93% (19 Oct 2018 07:03) (93% - 93%)      PHYSICAL EXAM: Alert & Oriented X2  GENERAL: NAD, well-groomed, well-developed  HEAD:  Atraumatic, Normocephalic  EYES: EOMI, PERRLA, conjunctiva and sclera clear  NECK: Supple, No JVD, Normal thyroid  CHEST/LUNG: Clear to percussion bilaterally; No rales, rhonchi, wheezing, or rubs  HEART: Regular rate and rhythm; No murmurs, rubs, or gallops  ABDOMEN: Soft, Nontender, Nondistended; Bowel sounds present  EXTREMITIES:  2+ Peripheral Pulses, No clubbing, cyanosis, or edema    NERVOUS SYSTEM:  Cranial Nerves 2-12 intact [-  ] Abnormal  [  ]  ROM: WFL all extremities [  ]  Abnormal [ x ]  Motor Strength: WFL all extremities  [  ]  Abnormal [x  ]  Sensation: intact to light touch [  ] Abnormal [ x ]  Reflexes: Symmetric [  ]  Abnormal [ x ]    FUNCTIONAL STATUS:  Bed Mobility: Independent [  ]  Supervision [  ]  Needs Assistance [x  ]  N/A [  ]  Transfers: Independent [  ]  Supervision [  ]  Needs Assistance [ x ]  N/A [  ]   Ambulation: Independent [  ]  Supervision [  ]  Needs Assistance [x ]  N/A [  ]  ADL: Independent [  ] Requires Assistance [x  ] N/A [  ]      LABS:                        12.8   9.90  )-----------( 348      ( 19 Oct 2018 05:22 )             39.1     10-19    140  |  100  |  26<H>  ----------------------------<  92  4.1   |  26  |  1.0    Ca    9.0      19 Oct 2018 05:22    TPro  6.1  /  Alb  3.7  /  TBili  0.4  /  DBili  x   /  AST  9   /  ALT  13  /  AlkPhos  92  10-19    PT/INR - ( 18 Oct 2018 12:52 )   PT: 12.80 sec;   INR: 1.18 ratio         PTT - ( 18 Oct 2018 12:52 )  PTT:34.4 sec      RADIOLOGY & ADDITIONAL STUDIES:    Assesment:

## 2018-10-19 NOTE — PROGRESS NOTE ADULT - ATTENDING COMMENTS
Patient seen and evaluated independently medical resident note reviewed, I agree with plan and management, except as I have noted. IV steroids as per Opthalmology recommendations. MRI in am.

## 2018-10-19 NOTE — CONSULT NOTE ADULT - SUBJECTIVE AND OBJECTIVE BOX
YAHAIRA MONTIEL  MRN-[id number]  78y Male      Patient is a 78y old  Male who presents with a chief complaint of stroke (19 Oct 2018 11:14)    HEALTH ISSUES - R/O PROBLEM Dx:    HPI:  78y Male with h/o hemorrhagic CVA w/ residual spastic RHP and expressive aphasia presented to ED for c/o Right eye vision loss  As per family , patient suddenly developed Right eye vision loss , painless this morning of presentation. He kept on pointing at the right eye. Denies fever, chills, headache, nausea, vomiting .  baseline bedbound, and constant right sided 0/5  since cva (18 Oct 2018 15:28)      Extended HPI: Pt c/o sudden vision loss OD. Pt with h/o expressive aphasia c/o having sudden, painless vision loss OD yesterday. Pt's wife reports yesterday afternoon, patient started pointing to his right eye; reports that eye was mildly watery with some swelling of the eye. Vision loss has been stable since then - no improvement. Pt denies other associated numbness, weakness, headache or double vision.  (-)burning, itching, redness, tearing OD/OS  (-)flashes, floaters, eye pain OD/OS    PAST MEDICAL & SURGICAL HISTORY:  Right sided weakness  High cholesterol  Enlarged prostate  HTN (hypertension)  Hemorrhagic stroke  Bilateral cataracts    MEDICATIONS  (STANDING):  amLODIPine   Tablet 10 milliGRAM(s) Oral daily  atorvastatin 80 milliGRAM(s) Oral at bedtime  dipyridamole 200 mG/aspirin 25 mG 1 Capsule(s) Oral two times a day  enoxaparin Injectable 40 milliGRAM(s) SubCutaneous daily  finasteride 5 milliGRAM(s) Oral daily  hydrochlorothiazide 25 milliGRAM(s) Oral daily  levETIRAcetam 1000 milliGRAM(s) Oral two times a day  losartan 100 milliGRAM(s) Oral daily  pantoprazole    Tablet 40 milliGRAM(s) Oral before breakfast    MEDICATIONS  (PRN):  baclofen 10 milliGRAM(s) Oral every 8 hours PRN Muscle Spasm    Allergies  penicillins (Unknown)    Intolerances  none listed     POHx:  CIELO: >5 years with Dr. Ordoñez  Pseudophakia OU  Refractive Error OU  (-)injuries    Ocular Medications: none    FOHx: Non-contributory    VISUAL ACUITY c near card   OD: sc NLP PH: NI  OS sc ~20/80 PH: 20/50    NEURO TESTING  Pupils: 	PERRL, OD/OS (+)RAPD   EOMS: 	FULL OD/OS  CVF: 	OD: NLP; OS: grossly wnl    ANTERIOR SEGMENT EVALUATION:   lids/lashes: 	clear OD/OS  conjunctiva: 	clear OD/OS  cornea: 	clear OD/OS  anterior chamber: deep & quiet OD/OS  iris: 	flat and even OD/OS    INTRAOCULAR PRESSURE  Tonopen  OD:10 mmHg  OS: 15 mmHg  @1: 40 pm    POSTERIOR SEGMENT EVALUATION  Dilated: Tropicamide 1%, Phenylephrine 2.5% OD/OS  Lens: 		PCIOL, clear OD/OS  Vitreous: 	clear OD/OS  Optic nerve:	distinct, pink and healthy OD/OS  Macula: 	flat, even OD/OS  Vessels: 	2:3 OD/OS  Periphery: 	flat, (-)holes/breaks/tears 360 OD/OS YAHAIRA MONTIEL  MRN-[id number]  78y Male      Patient is a 78y old  Male who presents with a chief complaint of stroke (19 Oct 2018 11:14)    HPI:  78y Male with h/o hemorrhagic CVA w/ residual spastic RHP and expressive aphasia presented to ED for c/o Right eye vision loss  As per family , patient suddenly developed Right eye vision loss , painless this morning of presentation. He kept on pointing at the right eye. Denies fever, chills, headache, nausea, vomiting .  baseline bedbound, and constant right sided 0/5  since cva (18 Oct 2018 15:28)      Extended HPI: Pt c/o sudden vision loss OD. Pt with h/o expressive aphasia c/o having sudden, painless vision loss OD yesterday. Pt's wife reports yesterday afternoon, patient started pointing to his right eye; Pt indicated that he could not see out of that eye. Pt's wife reports that right eye was mildly watery with some swelling. Pt reports vision loss has been stable since then - no improvement. Pt denies other associated numbness, weakness, headache or double vision.  (-)burning, itching, redness, tearing OD/OS  (-)flashes, floaters, eye pain OD/OS    PAST MEDICAL & SURGICAL HISTORY:  Right sided weakness  High cholesterol  Enlarged prostate  HTN (hypertension)  Hemorrhagic stroke  Bilateral cataracts    MEDICATIONS  (STANDING):  amLODIPine   Tablet 10 milliGRAM(s) Oral daily  atorvastatin 80 milliGRAM(s) Oral at bedtime  dipyridamole 200 mG/aspirin 25 mG 1 Capsule(s) Oral two times a day  enoxaparin Injectable 40 milliGRAM(s) SubCutaneous daily  finasteride 5 milliGRAM(s) Oral daily  hydrochlorothiazide 25 milliGRAM(s) Oral daily  levETIRAcetam 1000 milliGRAM(s) Oral two times a day  losartan 100 milliGRAM(s) Oral daily  pantoprazole    Tablet 40 milliGRAM(s) Oral before breakfast    MEDICATIONS  (PRN):  baclofen 10 milliGRAM(s) Oral every 8 hours PRN Muscle Spasm    Allergies  penicillins (Unknown)    Intolerances  none listed     POHx:  CIELO: >5 years with Dr. Ordoñez  Pseudophakia OU  Refractive Error OU  (-)injuries    Ocular Medications: none    FOHx: Non-contributory    VISUAL ACUITY c near card   OD: sc NLP PH: NI  OS sc ~20/80 PH: 20/50    NEURO TESTING  Pupils: 	PERRL, OD/OS (+)RAPD   EOMS: 	FULL OD/OS  CVF: 	OD: NLP; OS: grossly wnl    ANTERIOR SEGMENT EVALUATION:   lids/lashes: 	clear OD/OS  conjunctiva: 	clear OD/OS  cornea: 	clear OD/OS  anterior chamber: deep & quiet OD/OS  iris: 	flat and even OD/OS    INTRAOCULAR PRESSURE  Tonopen  OD:10 mmHg  OS: 15 mmHg  @1: 40 pm    POSTERIOR SEGMENT EVALUATION  Dilated: Tropicamide 1%, Phenylephrine 2.5% OD/OS  Lens: 		PCIOL, clear OD/OS  Vitreous: 	clear OD/OS  Optic nerve:	distinct, pink and healthy OD/OS  Macula: 	flat, even OD/OS  Vessels: 	2:3 OD/OS  Periphery: 	flat, (-)holes/breaks/tears 360 OD/OS YAHAIRA MONTIEL  [id number]  78y Male      Patient is a 78y old  Male who presents with a chief complaint of stroke (19 Oct 2018 11:14)    HPI:  78y Male with h/o hemorrhagic CVA w/ residual spastic RHP and expressive aphasia presented to ED for c/o Right eye vision loss  As per family , patient suddenly developed Right eye vision loss , painless this morning of presentation. He kept on pointing at the right eye. Denies fever, chills, headache, nausea, vomiting .  baseline bedbound, and constant right sided 0/5  since cva (18 Oct 2018 15:28)      Extended HPI: Pt c/o sudden vision loss OD. Pt with h/o expressive aphasia c/o having sudden, painless vision loss OD yesterday. Pt's wife reports yesterday afternoon, patient started pointing to his right eye; Pt indicated that he could not see out of that eye. Pt's wife reports that right eye was mildly watery with some swelling. Pt reports vision loss has been stable since then - no improvement. Pt denies other associated numbness, weakness, headache or double vision.  (-)burning, itching, redness, tearing OD/OS  (-)flashes, floaters, eye pain OD/OS    PAST MEDICAL & SURGICAL HISTORY:  Right sided weakness  High cholesterol  Enlarged prostate  HTN (hypertension)  Hemorrhagic stroke  Bilateral cataracts    MEDICATIONS  (STANDING):  amLODIPine   Tablet 10 milliGRAM(s) Oral daily  atorvastatin 80 milliGRAM(s) Oral at bedtime  dipyridamole 200 mG/aspirin 25 mG 1 Capsule(s) Oral two times a day  enoxaparin Injectable 40 milliGRAM(s) SubCutaneous daily  finasteride 5 milliGRAM(s) Oral daily  hydrochlorothiazide 25 milliGRAM(s) Oral daily  levETIRAcetam 1000 milliGRAM(s) Oral two times a day  losartan 100 milliGRAM(s) Oral daily  pantoprazole    Tablet 40 milliGRAM(s) Oral before breakfast    MEDICATIONS  (PRN):  baclofen 10 milliGRAM(s) Oral every 8 hours PRN Muscle Spasm    Allergies  penicillins (Unknown)    Intolerances  none listed     POHx:  CIELO: >5 years with Dr. Ordoñez  Pseudophakia OU  Refractive Error OU  (-)injuries    Ocular Medications: none    FOHx: Non-contributory    VISUAL ACUITY c near card   OD: sc NLP PH: NI  OS sc ~20/80 PH: 20/50    NEURO TESTING  Pupils: 	PERRL, miotic pupils OD/OS (+)RAPD   EOMS: 	FULL OD/OS  CVF: 	OD: NLP; OS: grossly wnl    ANTERIOR SEGMENT EVALUATION:   lids/lashes: 	clear OD/OS  conjunctiva: 	clear OD/OS  cornea: 	clear OD/OS  anterior chamber: deep & quiet OD/OS  iris: 	flat and even OD/OS    INTRAOCULAR PRESSURE  Tonopen  OD:10 mmHg  OS: 15 mmHg  @1: 40 pm    POSTERIOR SEGMENT EVALUATION  Dilated: Tropicamide 1%, Phenylephrine 2.5% OD/OS x 2 (poor dilation)  Lens: 		PCIOL, clear OD/OS  Vitreous: 	clear OD/OS  Optic nerve:	distinct, pink and healthy OD/OS  Macula: 	flat, even OD/OS  Vessels: 	2:3 OD/OS  Periphery: 	flat, (-)holes/breaks/tears 360 OD/OS YAHAIRA MONTIEL  MRN-[id number]  78y Male      Patient is a 78y old  Male who presents with a chief complaint of stroke (19 Oct 2018 11:14)    HPI:  78y Male with h/o hemorrhagic CVA w/ residual spastic RHP and expressive aphasia presented to ED for c/o Right eye vision loss  As per family , patient suddenly developed Right eye vision loss , painless this morning of presentation. He kept on pointing at the right eye. Denies fever, chills, headache, nausea, vomiting .  baseline bedbound, and constant right sided 0/5  since cva (18 Oct 2018 15:28)      Extended HPI: Pt c/o sudden vision loss OD. Pt with h/o expressive aphasia c/o having sudden, painless vision loss OD yesterday. Pt's wife reports yesterday afternoon, patient started pointing to his right eye; Pt indicated that he could not see out of that eye. Pt reports that Pt has been complaining of blurry vision OU for 1 month associated with his chronic headaches. Pt has h/o hemorrhagic stroke ~7 months ago. Pt's wife reports that right eye was mildly watery with some swelling. Pt reports vision loss has been stable since then - no improvement. Pt's wife reports patient has loss of appetite, some fatigue, joint pain but denies jaw pain or scalp pain . Pt denies other associated numbness, weakness or double vision.  (-)burning, itching, redness, tearing OD/OS  (-)flashes, floaters, eye pain OD/OS    PAST MEDICAL & SURGICAL HISTORY:  Right sided weakness  High cholesterol  Enlarged prostate  HTN (hypertension)  Hemorrhagic stroke  Bilateral cataracts    MEDICATIONS  (STANDING):  amLODIPine   Tablet 10 milliGRAM(s) Oral daily  atorvastatin 80 milliGRAM(s) Oral at bedtime  dipyridamole 200 mG/aspirin 25 mG 1 Capsule(s) Oral two times a day  enoxaparin Injectable 40 milliGRAM(s) SubCutaneous daily  finasteride 5 milliGRAM(s) Oral daily  hydrochlorothiazide 25 milliGRAM(s) Oral daily  levETIRAcetam 1000 milliGRAM(s) Oral two times a day  losartan 100 milliGRAM(s) Oral daily  pantoprazole    Tablet 40 milliGRAM(s) Oral before breakfast    MEDICATIONS  (PRN):  baclofen 10 milliGRAM(s) Oral every 8 hours PRN Muscle Spasm    Allergies  penicillins (Unknown)    Intolerances  none listed     POHx:  CIELO: >5 years with Dr. Ordoñez  Pseudophakia OU  Refractive Error OU  (-)injuries    Ocular Medications: none    FOHx: Non-contributory    VISUAL ACUITY c near card   OD: sc NLP PH: NI  OS sc ~20/80 PH: 20/50    NEURO TESTING  Pupils: 	PERRL, miotic pupils OD/OS (+)RAPD   EOMS: 	FULL OD/OS  CVF: 	OD: NLP; OS: grossly wnl    ANTERIOR SEGMENT EVALUATION:   lids/lashes: 	clear OD/OS  conjunctiva: 	clear OD/OS  cornea: 	clear OD/OS  anterior chamber: deep & quiet OD/OS  iris: 	flat and even OD/OS    INTRAOCULAR PRESSURE  Tonopen  OD:10 mmHg  OS: 15 mmHg  @1: 40 pm    POSTERIOR SEGMENT EVALUATION  Dilated: Tropicamide 1%, Phenylephrine 2.5% OD/OS x 2 (poor dilation)  Lens: 		PCIOL, clear OD/OS  Vitreous: 	clear OD/OS  Optic nerve:	OS: 3+ pallid edema, OS: distinct, pink and healthy OD/OS  Macula: 	flat, even OD/OS  Vessels: 	2:3 OD/OS  Periphery: 	flat, (-)holes/breaks/tears 360 OD/OS

## 2018-10-19 NOTE — PROGRESS NOTE ADULT - ASSESSMENT
78y Male with h/o hemorrhagic CVA w/ residual spastic RHP and expressive aphasia presented to ED for c/o Right eye vision loss    #Vision loss -   cth reported negative  ASA 81 , lipitor 80 mg   neuro recs for possible MRI ?  f/u tsh , lipid profile   Opthalmology eval   speech and swallow analysis   PT/rehab    #htn-stable   #hld-stable, f/u lipid profile  #dvt ppx -lovenox  #code status- full code, from home, lives with wife 78y Male with h/o hemorrhagic CVA w/ residual spastic RHP and expressive aphasia presented to ED for c/o Right eye vision loss    #Vision loss -   cth reported negative  c/w aggrenox  , lipitor 80 mg   neuro recs for possible MRI ?  f/u tsh , lipid profile   Opthalmology evaluation to r/o retinal causes of vision loss like retinal detachment   speech and swallow analysis   PT/rehab    #htn-stable   #hld-stable, f/u lipid profile  #dvt ppx -lovenox  #code status- dnr/dni , lives at home with wife 78y Male with h/o hemorrhagic CVA w/ residual spastic RHP and expressive aphasia presented to ED for c/o Right eye vision loss    #Vision loss -   cth reported negative  c/w aggrenox  , lipitor 80 mg   neuro recs for possible MRI ?  f/u tsh , lipid profile   Opthalmology evaluation to r/o retinal causes of vision loss like retinal detachment   speech and swallow analysis   PT/rehab    #htn-stable   #hld-stable, f/u lipid profile  #dvt ppx -lovenox  #code status- was dnr/dni in p[ast, now wife wants to think about it .MOLST form provided to wife ,she will d/w her family. Until then full code , lives at home with wife 78y Male with h/o hemorrhagic CVA w/ residual spastic RHP and expressive aphasia presented to ED for c/o Right eye vision loss    #Vision loss -   cth reported negative  c/w aggrenox  , lipitor 80 mg   neuro recs for possible MRI   f/u tsh , lipid profile   Opthalmology evaluation to r/o retinal causes of vision loss like retinal detachment   speech and swallow analysis   PT/rehab    #htn-stable   #hld-stable, f/u lipid profile  #dvt ppx -lovenox  #code status- was dnr/dni in p[ast, now wife wants to think about it .MOLST form provided to wife ,she will d/w her family. Until then full code , lives at home with wife

## 2018-10-19 NOTE — PROGRESS NOTE ADULT - SUBJECTIVE AND OBJECTIVE BOX
YAHAIRA MONTIEL  78y  Male    Complaints:  HPI and interval subjective history:    78y Male with h/o hemorrhagic CVA w/ residual spastic RHP and expressive aphasia presented to ED for c/o Right eye vision loss  As per family , patient suddenly developed Right eye vision loss , painless this morning of presentation. He kept on pointing at the right eye. Denies fever, chills, headache, nausea, vomiting .  baseline bedbound, and constant right sided 0/5  since cva        Vital Signs Last 24 Hrs  T(C): 36.4 (19 Oct 2018 07:01), Max: 36.4 (19 Oct 2018 07:01)  T(F): 97.6 (19 Oct 2018 07:01), Max: 97.6 (19 Oct 2018 07:01)  HR: 78 (19 Oct 2018 07:03) (62 - 84)  BP: 136/57 (19 Oct 2018 07:03) (134/63 - 153/74)  BP(mean): 82 (19 Oct 2018 07:03) (82 - 91)  RR: 20 (19 Oct 2018 07:01) (17 - 20)  SpO2: 93% (19 Oct 2018 07:03) (93% - 97%)    PHYSICAL EXAM:  HEENT: No icterus,. Moist mucous membranes , pinpoint b/l pupil but equal and reactive   	Neck: No JVD noted. Supple  	Cardio: S1, S2 noted, RRR. No murmurs, rubs or gallops  	Resp: Clear to auscultation b/l. No adventitious sounds  	Abdo: Soft, NT, bowel sounds present. No organomegaly  	Extremities: No edema noted. Pulses present b/l  	Neuro: awake and alert, follows simple commands . Decreased vision Right eye . RUE AND RLE 0/5 ; LUE AND LLE 5/5    SKIN: No rashes or lesions    Consultant(s) Notes Reviewed:  [x ] YES  [ ] NO  Care Discussed with Consultants/Other Providers [ x] YES  [ ] NO    LABS:  LABS:                        12.8   9.90  )-----------( 348      ( 19 Oct 2018 05:22 )             39.1     19 Oct 2018 05:22    140    |  100    |  26     ----------------------------<  92     4.1     |  26     |  1.0      Ca    9.0        19 Oct 2018 05:22    TPro  6.1    /  Alb  3.7    /  TBili  0.4    /  DBili  x      /  AST  9      /  ALT  13     /  AlkPhos  92     19 Oct 2018 05:22    PT/INR - ( 18 Oct 2018 12:52 )   PT: 12.80 sec;   INR: 1.18 ratio         PTT - ( 18 Oct 2018 12:52 )  PTT:34.4 sec      <  RADIOLOGY & ADDITIONAL TESTS:   from: CT Head No Cont (10.18.18 @ 13:00) >  1.  No evidence of acute intracranial hemorrhage or large territory   infarct. Stable exam since 3/31/2018.    2.  Stable severe chronic microvascular and chronic infarcts.    3.  Stable ventricular enlargement.    < end of copied text >      Imaging Personally Reviewed:  [ ] YES  [ ] NO  MedsMEDICATIONS  (STANDING):  amLODIPine   Tablet 10 milliGRAM(s) Oral daily  atorvastatin 80 milliGRAM(s) Oral at bedtime  dipyridamole 200 mG/aspirin 25 mG 1 Capsule(s) Oral two times a day  enoxaparin Injectable 40 milliGRAM(s) SubCutaneous daily  finasteride 5 milliGRAM(s) Oral daily  hydrochlorothiazide 25 milliGRAM(s) Oral daily  levETIRAcetam 1000 milliGRAM(s) Oral two times a day  losartan 100 milliGRAM(s) Oral daily  pantoprazole    Tablet 40 milliGRAM(s) Oral before breakfast    MEDICATIONS  (PRN):  baclofen 10 milliGRAM(s) Oral every 8 hours PRN Muscle Spasm YAHAIRA MONTIEL  78y  Male    Complaints:  HPI and interval subjective history:    78y Male with h/o hemorrhagic CVA w/ residual spastic RHP and expressive aphasia presented to ED for c/o Right eye vision loss  As per family , patient suddenly developed Right eye vision loss , painless this morning of presentation. He kept on pointing at the right eye. Denies fever, chills, headache, nausea, vomiting .  baseline bedbound, and constant right sided 0/5  since cva        Vital Signs Last 24 Hrs  T(C): 36.4 (19 Oct 2018 07:01), Max: 36.4 (19 Oct 2018 07:01)  T(F): 97.6 (19 Oct 2018 07:01), Max: 97.6 (19 Oct 2018 07:01)  HR: 78 (19 Oct 2018 07:03) (62 - 84)  BP: 136/57 (19 Oct 2018 07:03) (134/63 - 153/74)  BP(mean): 82 (19 Oct 2018 07:03) (82 - 91)  RR: 20 (19 Oct 2018 07:01) (17 - 20)  SpO2: 93% (19 Oct 2018 07:03) (93% - 97%)    PHYSICAL EXAM:  HEENT: No icterus,. Moist mucous membranes , pinpoint b/l pupil but equal and reactive   	Neck: No JVD noted. Supple  	Cardio: S1, S2 noted, RRR. No murmurs, rubs or gallops  	Resp: Clear to auscultation b/l. No adventitious sounds  	Abdo: Soft, NT, bowel sounds present. No organomegaly  	Extremities: No edema noted. Pulses present b/l  	Neuro: awake and alert, follows simple commands . Decreased vision Right eye . RUE AND RLE 0/5 ; LUE AND LLE 5/5    SKIN: No rashes or lesions    Consultant(s) Notes Reviewed:  [x ] YES  [ ] NO  Care Discussed with Consultants/Other Providers [ x] YES  [ ] NO    LABS:  LABS:                        12.8   9.90  )-----------( 348      ( 19 Oct 2018 05:22 )             39.1     19 Oct 2018 05:22    140    |  100    |  26     ----------------------------<  92     4.1     |  26     |  1.0      Ca    9.0        19 Oct 2018 05:22    TPro  6.1    /  Alb  3.7    /  TBili  0.4    /  DBili  x      /  AST  9      /  ALT  13     /  AlkPhos  92     19 Oct 2018 05:22    PT/INR - ( 18 Oct 2018 12:52 )   PT: 12.80 sec;   INR: 1.18 ratio         PTT - ( 18 Oct 2018 12:52 )  PTT:34.4 sec      <  RADIOLOGY & ADDITIONAL TESTS:   from: CT Head No Cont (10.18.18 @ 13:00) >  1.  No evidence of acute intracranial hemorrhage or large territory infarct. Stable exam since 3/31/2018.  2.  Stable severe chronic microvascular and chronic infarcts.  3.  Stable ventricular enlargement.    < end of copied text >    < from: Xray Chest 1 View- PORTABLE-Urgent (10.18.18 @ 14:50) >  No radiographic evidence of acute cardiopulmonary disease.    < end of copied text >  < from: 12 Lead ECG (10.18.18 @ 14:51) >   Normal sinus rhythm  Normal ECG    < end of copied text >    Imaging Personally Reviewed:  [ x] YES  [ ] NO  MedsMEDICATIONS  (STANDING):  amLODIPine   Tablet 10 milliGRAM(s) Oral daily  atorvastatin 80 milliGRAM(s) Oral at bedtime  dipyridamole 200 mG/aspirin 25 mG 1 Capsule(s) Oral two times a day  enoxaparin Injectable 40 milliGRAM(s) SubCutaneous daily  finasteride 5 milliGRAM(s) Oral daily  hydrochlorothiazide 25 milliGRAM(s) Oral daily  levETIRAcetam 1000 milliGRAM(s) Oral two times a day  losartan 100 milliGRAM(s) Oral daily  pantoprazole    Tablet 40 milliGRAM(s) Oral before breakfast    MEDICATIONS  (PRN):  baclofen 10 milliGRAM(s) Oral every 8 hours PRN Muscle Spasm

## 2018-10-19 NOTE — CONSULT NOTE ADULT - ASSESSMENT
Plan:  Neuro evaluation    enoxaparin   Keep SBP < 180.    Head CT at 24 hours  or sooner if acute neuro changes.    Rehab evaluation, PT, OT, and speech evals.  After 24 hours begin aspirin 81 mg/day.   MRI brain and MRA head w/o contrast and MRA neck with contrast after patient out of CCU.    Lipid panel and treat hyperlipidemia with statin.

## 2018-10-19 NOTE — PHYSICAL THERAPY INITIAL EVALUATION ADULT - GENERAL OBSERVATIONS, REHAB EVAL
11:05-11:25 Chart reviewed. Pt encountered reclined in chair,  may be seen by Physical Therapist as confirmed with Nurse. Patient denied pain at rest and would like to try to stand; +tele

## 2018-10-20 LAB
ANION GAP SERPL CALC-SCNC: 17 MMOL/L — HIGH (ref 7–14)
BASOPHILS # BLD AUTO: 0.01 K/UL — SIGNIFICANT CHANGE UP (ref 0–0.2)
BASOPHILS NFR BLD AUTO: 0.1 % — SIGNIFICANT CHANGE UP (ref 0–1)
BUN SERPL-MCNC: 30 MG/DL — HIGH (ref 10–20)
CALCIUM SERPL-MCNC: 9 MG/DL — SIGNIFICANT CHANGE UP (ref 8.5–10.1)
CHLORIDE SERPL-SCNC: 101 MMOL/L — SIGNIFICANT CHANGE UP (ref 98–110)
CO2 SERPL-SCNC: 22 MMOL/L — SIGNIFICANT CHANGE UP (ref 17–32)
CREAT SERPL-MCNC: 1.1 MG/DL — SIGNIFICANT CHANGE UP (ref 0.7–1.5)
CRP SERPL-MCNC: 2.26 MG/DL — HIGH (ref 0–0.4)
EOSINOPHIL # BLD AUTO: 0 K/UL — SIGNIFICANT CHANGE UP (ref 0–0.7)
EOSINOPHIL NFR BLD AUTO: 0 % — SIGNIFICANT CHANGE UP (ref 0–8)
ERYTHROCYTE [SEDIMENTATION RATE] IN BLOOD: 70 MM/HR — HIGH (ref 0–10)
GLUCOSE SERPL-MCNC: 144 MG/DL — HIGH (ref 70–99)
HCT VFR BLD CALC: 38.3 % — LOW (ref 42–52)
HGB BLD-MCNC: 12.8 G/DL — LOW (ref 14–18)
IMM GRANULOCYTES NFR BLD AUTO: 0.7 % — HIGH (ref 0.1–0.3)
LYMPHOCYTES # BLD AUTO: 0.81 K/UL — LOW (ref 1.2–3.4)
LYMPHOCYTES # BLD AUTO: 10.7 % — LOW (ref 20.5–51.1)
MCHC RBC-ENTMCNC: 29.4 PG — SIGNIFICANT CHANGE UP (ref 27–31)
MCHC RBC-ENTMCNC: 33.4 G/DL — SIGNIFICANT CHANGE UP (ref 32–37)
MCV RBC AUTO: 87.8 FL — SIGNIFICANT CHANGE UP (ref 80–94)
MONOCYTES # BLD AUTO: 0.12 K/UL — SIGNIFICANT CHANGE UP (ref 0.1–0.6)
MONOCYTES NFR BLD AUTO: 1.6 % — LOW (ref 1.7–9.3)
NEUTROPHILS # BLD AUTO: 6.6 K/UL — HIGH (ref 1.4–6.5)
NEUTROPHILS NFR BLD AUTO: 86.9 % — HIGH (ref 42.2–75.2)
PLATELET # BLD AUTO: 373 K/UL — SIGNIFICANT CHANGE UP (ref 130–400)
POTASSIUM SERPL-MCNC: 4.4 MMOL/L — SIGNIFICANT CHANGE UP (ref 3.5–5)
POTASSIUM SERPL-SCNC: 4.4 MMOL/L — SIGNIFICANT CHANGE UP (ref 3.5–5)
RBC # BLD: 4.36 M/UL — LOW (ref 4.7–6.1)
RBC # FLD: 13.2 % — SIGNIFICANT CHANGE UP (ref 11.5–14.5)
SODIUM SERPL-SCNC: 140 MMOL/L — SIGNIFICANT CHANGE UP (ref 135–146)
WBC # BLD: 7.59 K/UL — SIGNIFICANT CHANGE UP (ref 4.8–10.8)
WBC # FLD AUTO: 7.59 K/UL — SIGNIFICANT CHANGE UP (ref 4.8–10.8)

## 2018-10-20 RX ADMIN — LOSARTAN POTASSIUM 100 MILLIGRAM(S): 100 TABLET, FILM COATED ORAL at 05:30

## 2018-10-20 RX ADMIN — Medication 116 MILLIGRAM(S): at 05:32

## 2018-10-20 RX ADMIN — LEVETIRACETAM 1000 MILLIGRAM(S): 250 TABLET, FILM COATED ORAL at 17:25

## 2018-10-20 RX ADMIN — ASPIRIN AND DIPYRIDAMOLE 1 CAPSULE(S): 25; 200 CAPSULE, EXTENDED RELEASE ORAL at 17:25

## 2018-10-20 RX ADMIN — Medication 25 MILLIGRAM(S): at 05:30

## 2018-10-20 RX ADMIN — ASPIRIN AND DIPYRIDAMOLE 1 CAPSULE(S): 25; 200 CAPSULE, EXTENDED RELEASE ORAL at 05:30

## 2018-10-20 RX ADMIN — ENOXAPARIN SODIUM 40 MILLIGRAM(S): 100 INJECTION SUBCUTANEOUS at 11:58

## 2018-10-20 RX ADMIN — PANTOPRAZOLE SODIUM 40 MILLIGRAM(S): 20 TABLET, DELAYED RELEASE ORAL at 08:10

## 2018-10-20 RX ADMIN — FINASTERIDE 5 MILLIGRAM(S): 5 TABLET, FILM COATED ORAL at 11:58

## 2018-10-20 RX ADMIN — LEVETIRACETAM 1000 MILLIGRAM(S): 250 TABLET, FILM COATED ORAL at 05:32

## 2018-10-20 RX ADMIN — Medication 10 MILLIGRAM(S): at 05:32

## 2018-10-20 RX ADMIN — AMLODIPINE BESYLATE 10 MILLIGRAM(S): 2.5 TABLET ORAL at 05:30

## 2018-10-20 NOTE — PROGRESS NOTE ADULT - ASSESSMENT
ASS : r/o stroke , r/o temporal arteritis       Plan:  Neuro follow up for MRI MRA today as per neurology     enoxaparin  follow opthalmology on solumedrol    Keep SBP < 180.    Rehab evaluation, PT, OT, and speech evals.  After 24 hours begin aspirin 81 mg/day.    Lipid panel and treat hyperlipidemia with statin.  follow danielle

## 2018-10-20 NOTE — PROGRESS NOTE ADULT - SUBJECTIVE AND OBJECTIVE BOX
Neurology Follow up note      Name  YAHAIRA MONTIEL    HPI:  78y Male with h/o hemorrhagic CVA w/ residual spastic RHP and expressive aphasia presented to ED for c/o Right eye vision loss  As per family , patient suddenly developed Right eye vision loss , painless this morning of presentation. He kept on pointing at the right eye. Denies fever, chills, headache, nausea, vomiting .  baseline bedbound, and constant right sided 0/5  since cva (18 Oct 2018 15:28)  TPA WAS NOT ADMINISTERED ON ADM AS PATIENT WAS OUT OF WINDOW  PATIENT IS STABLE TODAY WITH A NEW R FIELD CUT  HE FOLLOWS WITH DR DAVID VARGAS AS AN OUTPATIENT FOR HIS PRIOR CVA AND IS ON AGGRENOX  PATIENT ALSO ON KEPPRA FOR LOCALIZATION RELATED EPILEPSY          Interval History discussed case with ophthalmologist at bedside and as there is concern for temporal arteritis recommended solumedrol initiation.  Solumedrol 1000 mg/day will be admin today and for the next 3 days f/b 1 mg/kg starting later  mri brain nc , mri head NC ordered and MRA orbit , face, neck with/without contrast ordered. Patient being transferred to [facility] for further evaluation by retinal specialist on monday for monoocular vision loss.  patient on high dose pulse steroid 1000 mg solumedrol for 3 days and from oct 22nd he will be on 1mg/kg steroid iv daily for suspected temporal arteritis related vision loss. Patient to get mri brain at [facility] to r/o CVA as a cause of his vision loss. Sign out given on phone to RESIDENT ON CALL FOR 3E NEUROLOGY FLOOR [facility], Dr. Luis Daniel Castillo.          -          Vital Signs Last 24 Hrs  T(C): 36.2 (20 Oct 2018 15:52), Max: 36.6 (20 Oct 2018 15:01)  T(F): 97.2 (20 Oct 2018 15:52), Max: 97.9 (20 Oct 2018 15:01)  HR: 72 (20 Oct 2018 15:52) (72 - 85)  BP: 119/57 (20 Oct 2018 15:52) (119/57 - 150/67)  BP(mean): 82 (20 Oct 2018 07:06) (82 - 92)  RR: 18 (20 Oct 2018 15:52) (16 - 18)  SpO2: 92% (20 Oct 2018 07:06) (90% - 95%)          Neurological Exam:                           Medications  amLODIPine   Tablet 10 milliGRAM(s) Oral daily  atorvastatin 80 milliGRAM(s) Oral at bedtime  baclofen 10 milliGRAM(s) Oral every 8 hours PRN  dipyridamole 200 mG/aspirin 25 mG 1 Capsule(s) Oral two times a day  enoxaparin Injectable 40 milliGRAM(s) SubCutaneous daily  finasteride 5 milliGRAM(s) Oral daily  hydrochlorothiazide 25 milliGRAM(s) Oral daily  levETIRAcetam 1000 milliGRAM(s) Oral two times a day  losartan 100 milliGRAM(s) Oral daily  methylPREDNISolone sodium succinate IVPB 1000 milliGRAM(s) IV Intermittent daily  pantoprazole    Tablet 40 milliGRAM(s) Oral before breakfast      Lab      Radiology Neurology Follow up note      Name  YAHAIRA MONTIEL    HPI:  78y Male with h/o hemorrhagic CVA w/ residual spastic RHP and expressive aphasia presented to ED for c/o Right eye vision loss  As per family , patient suddenly developed Right eye vision loss , painless this morning of presentation. He kept on pointing at the right eye. Denies fever, chills, headache, nausea, vomiting .  baseline bedbound, and constant right sided 0/5  since cva (18 Oct 2018 15:28)  TPA WAS NOT ADMINISTERED ON ADM AS PATIENT WAS OUT OF WINDOW  PATIENT IS STABLE TODAY WITH A NEW R FIELD CUT  HE FOLLOWS WITH DR DAVID VARGAS AS AN OUTPATIENT FOR HIS PRIOR CVA AND IS ON AGGRENOX  PATIENT ALSO ON KEPPRA FOR LOCALIZATION RELATED EPILEPSY          Interval History discussed case with ophthalmologist at bedside and as there is concern for temporal arteritis recommended solumedrol initiation.  Solumedrol 1000 mg/day will be admin today and for the next 3 days f/b 1 mg/kg starting later  mri brain nc , mri head NC ordered and MRA orbit , face, neck with/without contrast ordered. Patient being transferred to [facility] for further evaluation by retinal specialist on monday for monoocular vision loss.  patient on high dose pulse steroid 1000 mg solumedrol for 3 days and from oct 22nd he will be on 1mg/kg steroid iv daily for suspected temporal arteritis related vision loss. Patient to get mri brain at [facility] to r/o CVA as a cause of his vision loss. Sign out given on phone to RESIDENT ON CALL FOR 3E NEUROLOGY FLOOR [facility], Dr. Luis Daniel Castillo.      pt arrived to neuro floor intact. MRI/ MRA of brain obtain shortly after          -          Vital Signs Last 24 Hrs  T(C): 36.2 (20 Oct 2018 15:52), Max: 36.6 (20 Oct 2018 15:01)  T(F): 97.2 (20 Oct 2018 15:52), Max: 97.9 (20 Oct 2018 15:01)  HR: 72 (20 Oct 2018 15:52) (72 - 85)  BP: 119/57 (20 Oct 2018 15:52) (119/57 - 150/67)  BP(mean): 82 (20 Oct 2018 07:06) (82 - 92)  RR: 18 (20 Oct 2018 15:52) (16 - 18)  SpO2: 92% (20 Oct 2018 07:06) (90% - 95%)          Neurological Exam:                           Medications  amLODIPine   Tablet 10 milliGRAM(s) Oral daily  atorvastatin 80 milliGRAM(s) Oral at bedtime  baclofen 10 milliGRAM(s) Oral every 8 hours PRN  dipyridamole 200 mG/aspirin 25 mG 1 Capsule(s) Oral two times a day  enoxaparin Injectable 40 milliGRAM(s) SubCutaneous daily  finasteride 5 milliGRAM(s) Oral daily  hydrochlorothiazide 25 milliGRAM(s) Oral daily  levETIRAcetam 1000 milliGRAM(s) Oral two times a day  losartan 100 milliGRAM(s) Oral daily  methylPREDNISolone sodium succinate IVPB 1000 milliGRAM(s) IV Intermittent daily  pantoprazole    Tablet 40 milliGRAM(s) Oral before breakfast      Lab      Radiology: MRI No evidence of acute  infarct or hemmorhages. 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 He kept on pointing at the right eye. Denies fever, chills, headache, nausea, vomiting .  baseline bedbound, and constant right sided 0/5  since cva (18 Oct 2018 15:28)  TPA WAS NOT ADMINISTERED ON ADM AS PATIENT WAS OUT OF WINDOW  PATIENT IS STABLE TODAY WITH A NEW R FIELD CUT  HE FOLLOWS WITH DR DAVID VARGAS AS AN OUTPATIENT FOR HIS PRIOR CVA AND IS ON AGGRENOX  PATIENT ALSO ON KEPPRA FOR LOCALIZATION RELATED EPILEPSY          Interval History discussed case with ophthalmologist at bedside and as there is concern for temporal arteritis recommended solumedrol initiation.  Solumedrol 1000 mg/day will be admin today and for the next 3 days f/b 1 mg/kg starting later  mri brain nc , mri head NC ordered and MRA orbit , face, neck with/without contrast ordered. Patient being transferred to [facility] for further evaluation by retinal specialist on monday for monoocular vision loss.  patient on high dose pulse steroid 1000 mg solumedrol for 3 days and from oct 22nd he will be on 1mg/kg steroid iv daily for suspected temporal arteritis related vision loss. Patient to get mri brain at [facility] to r/o CVA as a cause of his vision loss. Sign out given on phone to RESIDENT ON CALL FOR 3E NEUROLOGY FLOOR [facility], Dr. Luis Daniel Castillo.      pt arrived to neuro floor intact. MRI/ MRA of brain obtain shortly after arrival          -          Vital Signs Last 24 Hrs  T(C): 36.2 (20 Oct 2018 15:52), Max: 36.6 (20 Oct 2018 15:01)  T(F): 97.2 (20 Oct 2018 15:52), Max: 97.9 (20 Oct 2018 15:01)  HR: 72 (20 Oct 2018 15:52) (72 - 85)  BP: 119/57 (20 Oct 2018 15:52) (119/57 - 150/67)  BP(mean): 82 (20 Oct 2018 07:06) (82 - 92)  RR: 18 (20 Oct 2018 15:52) (16 - 18)  SpO2: 92% (20 Oct 2018 07:06) (90% - 95%)          Neurological Exam:                           Medications  amLODIPine   Tablet 10 milliGRAM(s) Oral daily  atorvastatin 80 milliGRAM(s) Oral at bedtime  baclofen 10 milliGRAM(s) Oral every 8 hours PRN  dipyridamole 200 mG/aspirin 25 mG 1 Capsule(s) Oral two times a day  enoxaparin Injectable 40 milliGRAM(s) SubCutaneous daily  finasteride 5 milliGRAM(s) Oral daily  hydrochlorothiazide 25 milliGRAM(s) Oral daily  levETIRAcetam 1000 milliGRAM(s) Oral two times a day  losartan 100 milliGRAM(s) Oral daily  methylPREDNISolone sodium succinate IVPB 1000 milliGRAM(s) IV Intermittent daily  pantoprazole    Tablet 40 milliGRAM(s) Oral before breakfast      Lab      Radiology: MRI No evidence of acute  infarct or hemmorhages. 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

## 2018-10-20 NOTE — PROGRESS NOTE ADULT - ASSESSMENT
IMP: 86 y/o M with hx of  hemorrhagic CVA presents with Rt vision last    Plan: Please obtain MRI of brain with MRA of head and Neck.. tonight as per rec's from [facility] r/o CAV         Keep NPO at MN          Will follow along IMP: 86 y/o M with hx of  hemorrhagic CVA presents with Rt vision last    Plan: Please obtain MRI of brain with MRA of head and Neck.. tonight as per rec's from [facility] r/o CAV         Keep NPO at MN           Retinal eval in AM         Will follow along IMP: 86 y/o M with hx of  hemorrhagic CVA presents with Rt vision last    Plan: Please obtain MRI of brain with MRA of head and Neck.. tonight as per rec's from [facility] r/o CVA         Keep NPO at MN           Retinal eval in AM         Will follow along

## 2018-10-20 NOTE — PROGRESS NOTE ADULT - SUBJECTIVE AND OBJECTIVE BOX
Patient is a 78y old  Male who presents with a chief complaint of stroke (19 Oct 2018 17:26)      Over Night Events:  Patient seen and examined seen by opthalmology yesterday  started on solumedrol for ?? temporal arteritis   pending MRI today       ROS:  See HPI    PHYSICAL EXAM    ICU Vital Signs Last 24 Hrs  T(C): 35.8 (19 Oct 2018 23:01), Max: 36.4 (19 Oct 2018 07:01)  T(F): 96.5 (19 Oct 2018 23:01), Max: 97.6 (19 Oct 2018 07:01)  HR: 73 (20 Oct 2018 05:28) (73 - 86)  BP: 133/61 (20 Oct 2018 05:28) (121/59 - 176/72)  BP(mean): 87 (20 Oct 2018 05:28) (82 - 92)  ABP: --  ABP(mean): --  RR: 18 (20 Oct 2018 05:28) (18 - 20)  SpO2: 95% (20 Oct 2018 05:28) (90% - 95%)      General: Awake aphasia   HEENT:          loss vision right eye       Lymph Nodes: NO cervical LN   Lungs: Bilateral BS  Cardiovascular: Regular   Abdomen: Soft, Positive BS  Extremities: No clubbing   Skin:   Neurological: senation intact   Musculoskeletal: move all ext     I&O's Detail    18 Oct 2018 07:01  -  19 Oct 2018 07:00  --------------------------------------------------------  IN:    Oral Fluid: 120 mL  Total IN: 120 mL    OUT:  Total OUT: 0 mL    Total NET: 120 mL      19 Oct 2018 07:01  -  20 Oct 2018 06:45  --------------------------------------------------------  IN:    IV PiggyBack: 50 mL    Oral Fluid: 160 mL  Total IN: 210 mL    OUT:  Total OUT: 0 mL    Total NET: 210 mL          LABS:                          12.8   7.59  )-----------( 373      ( 20 Oct 2018 05:24 )             38.3         19 Oct 2018 05:22    140    |  100    |  26     ----------------------------<  92     4.1     |  26     |  1.0      Ca    9.0        19 Oct 2018 05:22                                               PT/INR - ( 18 Oct 2018 12:52 )   PT: 12.80 sec;   INR: 1.18 ratio         PTT - ( 18 Oct 2018 12:52 )  PTT:34.4 sec                                             CARDIAC MARKERS ( 18 Oct 2018 12:52 )  x     / <0.01 ng/mL / 63 U/L / x     / 1.2 ng/mL                                                                                                                                             MEDICATIONS  (STANDING):  amLODIPine   Tablet 10 milliGRAM(s) Oral daily  atorvastatin 80 milliGRAM(s) Oral at bedtime  dipyridamole 200 mG/aspirin 25 mG 1 Capsule(s) Oral two times a day  enoxaparin Injectable 40 milliGRAM(s) SubCutaneous daily  finasteride 5 milliGRAM(s) Oral daily  hydrochlorothiazide 25 milliGRAM(s) Oral daily  levETIRAcetam 1000 milliGRAM(s) Oral two times a day  losartan 100 milliGRAM(s) Oral daily  methylPREDNISolone sodium succinate IVPB 1000 milliGRAM(s) IV Intermittent daily  pantoprazole    Tablet 40 milliGRAM(s) Oral before breakfast    MEDICATIONS  (PRN):  baclofen 10 milliGRAM(s) Oral every 8 hours PRN Muscle Spasm          Xrays:  Srable   OG:  ET tube:                                                                                       ECHO:

## 2018-10-20 NOTE — CHART NOTE - NSCHARTNOTEFT_GEN_A_CORE
RESIDENT ON-CALL TRANSFER NOTE    patient being transferred to [facility] for further evaluation by retinal specialist on monday for monoocular vision loss.  patient on high dose pulse steroid 1000 mg solumedrol for 3 days and from oct 22nd he will be on 1mg/kg steroid iv daily for suspected temporal arteritis related vision loss    patient to get mri brain at [facility] to r/o CVA as a cause of his vision loss.    signout given on phone to RESIDENT ON CALL FOR 3E NEUROLOGY FLOOR [facility], Dr. Luis Daniel Castillo

## 2018-10-20 NOTE — PROGRESS NOTE ADULT - ATTENDING COMMENTS
patient seen and examined- wife by bedside- patient is aphasic but understands the conversation    chart reviewed-  acute rt vision loss- hist of headache two weeks prior- but also reports a lot of body ache  Eye exam showed rt papilledema-   ESR 70-   patient placed on steroids for temporal arteritis-    A/P:    ? temporal arteritis ? ischemic optic neuropathy    recommend temporal artery biopsy   to decide about continuation of long term steroid therapy  carotid doppler  retina exam tomorrow    explained all above to patient's wife- anish lee about temporal artery biopsy after retinal exam

## 2018-10-21 LAB
ANION GAP SERPL CALC-SCNC: 18 MMOL/L — HIGH (ref 7–14)
BUN SERPL-MCNC: 36 MG/DL — HIGH (ref 10–20)
CALCIUM SERPL-MCNC: 9.3 MG/DL — SIGNIFICANT CHANGE UP (ref 8.5–10.1)
CHLORIDE SERPL-SCNC: 101 MMOL/L — SIGNIFICANT CHANGE UP (ref 98–110)
CO2 SERPL-SCNC: 22 MMOL/L — SIGNIFICANT CHANGE UP (ref 17–32)
CREAT SERPL-MCNC: 1.2 MG/DL — SIGNIFICANT CHANGE UP (ref 0.7–1.5)
GLUCOSE SERPL-MCNC: 153 MG/DL — HIGH (ref 70–99)
HCT VFR BLD CALC: 41.4 % — LOW (ref 42–52)
HGB BLD-MCNC: 13.9 G/DL — LOW (ref 14–18)
MCHC RBC-ENTMCNC: 29.2 PG — SIGNIFICANT CHANGE UP (ref 27–31)
MCHC RBC-ENTMCNC: 33.6 G/DL — SIGNIFICANT CHANGE UP (ref 32–37)
MCV RBC AUTO: 87 FL — SIGNIFICANT CHANGE UP (ref 80–94)
NRBC # BLD: 0 /100 WBCS — SIGNIFICANT CHANGE UP (ref 0–0)
PLATELET # BLD AUTO: 490 K/UL — HIGH (ref 130–400)
POTASSIUM SERPL-MCNC: 3.9 MMOL/L — SIGNIFICANT CHANGE UP (ref 3.5–5)
POTASSIUM SERPL-SCNC: 3.9 MMOL/L — SIGNIFICANT CHANGE UP (ref 3.5–5)
RBC # BLD: 4.76 M/UL — SIGNIFICANT CHANGE UP (ref 4.7–6.1)
RBC # FLD: 13.7 % — SIGNIFICANT CHANGE UP (ref 11.5–14.5)
SODIUM SERPL-SCNC: 141 MMOL/L — SIGNIFICANT CHANGE UP (ref 135–146)
WBC # BLD: 23.4 K/UL — HIGH (ref 4.8–10.8)
WBC # FLD AUTO: 23.4 K/UL — HIGH (ref 4.8–10.8)

## 2018-10-21 RX ADMIN — PANTOPRAZOLE SODIUM 40 MILLIGRAM(S): 20 TABLET, DELAYED RELEASE ORAL at 06:21

## 2018-10-21 RX ADMIN — FINASTERIDE 5 MILLIGRAM(S): 5 TABLET, FILM COATED ORAL at 11:41

## 2018-10-21 RX ADMIN — ASPIRIN AND DIPYRIDAMOLE 1 CAPSULE(S): 25; 200 CAPSULE, EXTENDED RELEASE ORAL at 17:35

## 2018-10-21 RX ADMIN — Medication 10 MILLIGRAM(S): at 06:20

## 2018-10-21 RX ADMIN — AMLODIPINE BESYLATE 10 MILLIGRAM(S): 2.5 TABLET ORAL at 06:20

## 2018-10-21 RX ADMIN — LOSARTAN POTASSIUM 100 MILLIGRAM(S): 100 TABLET, FILM COATED ORAL at 06:21

## 2018-10-21 RX ADMIN — Medication 10 MILLIGRAM(S): at 21:18

## 2018-10-21 RX ADMIN — ATORVASTATIN CALCIUM 80 MILLIGRAM(S): 80 TABLET, FILM COATED ORAL at 21:18

## 2018-10-21 RX ADMIN — ENOXAPARIN SODIUM 40 MILLIGRAM(S): 100 INJECTION SUBCUTANEOUS at 11:41

## 2018-10-21 RX ADMIN — ASPIRIN AND DIPYRIDAMOLE 1 CAPSULE(S): 25; 200 CAPSULE, EXTENDED RELEASE ORAL at 06:22

## 2018-10-21 RX ADMIN — Medication 25 MILLIGRAM(S): at 06:21

## 2018-10-21 RX ADMIN — LEVETIRACETAM 1000 MILLIGRAM(S): 250 TABLET, FILM COATED ORAL at 06:22

## 2018-10-21 RX ADMIN — Medication 116 MILLIGRAM(S): at 06:23

## 2018-10-21 RX ADMIN — LEVETIRACETAM 1000 MILLIGRAM(S): 250 TABLET, FILM COATED ORAL at 17:35

## 2018-10-21 NOTE — CONSULT NOTE ADULT - ASSESSMENT
77 yo M with acute R eye vision loss    - continue solumedrol    - att to see      - monitor temperature.     - neuro checks

## 2018-10-21 NOTE — PROGRESS NOTE ADULT - ASSESSMENT
78y Male with h/o hemorrhagic CVA w/ residual spastic RHP and expressive aphasia presented to ED for c/o Right eye vision loss    #Vision loss - DDx CVA vs TA vs intraocular problem  MRI negative for acute CVA  c/w aggrenox  , lipitor 80 mg   d/w neuro - recommend vascular for temp artery biopsy  Opthalmology evaluation to r/o retinal causes of vision loss like retinal detachment   speech and swallow analysis   PT/rehab    #htn-stable   #hld-stable, f/u lipid profile  #dvt ppx -lovenox  #code status- was dnr/dni in p[ast, now wife wants to think about it .MOLST form provided to wife ,she will d/w her family. Until then full code , lives at home with wife 78y Male with h/o CVAs w/ residual spastic RHP and expressive aphasia presented to ED for c/o Right eye vision loss    #Vision loss - DDx CVA vs TA vs intraocular problem  MRI negative for acute CVA  c/w aggrenox  , lipitor 80 mg   d/w neuro - recommend vascular for temp artery biopsy  Opthalmology evaluation to r/o retinal causes of vision loss like retinal detachment   speech and swallow analysis   PT/rehab    #htn-stable   #hld-stable, f/u lipid profile  #dvt ppx -lovenox  #code status- was dnr/dni in p[ast, now wife wants to think about it .MOLST form provided to wife ,she will d/w her family. Until then full code , lives at home with wife

## 2018-10-21 NOTE — PROGRESS NOTE ADULT - SUBJECTIVE AND OBJECTIVE BOX
SUBJECTIVE:    Patient is a 78y old Male who presents with a chief complaint of stroke (21 Oct 2018 14:46)    Currently admitted to medicine with the primary diagnosis of Vision changes     Today is hospital day 3d. This morning he is resting comfortably in bed and reports no new issues or overnight events.     PAST MEDICAL & SURGICAL HISTORY  Right sided weakness  High cholesterol  Enlarged prostate  HTN (hypertension)  Hemorrhagic stroke  Bilateral cataracts      ALLERGIES:  penicillins (Unknown)    MEDICATIONS:  STANDING MEDICATIONS  amLODIPine   Tablet 10 milliGRAM(s) Oral daily  atorvastatin 80 milliGRAM(s) Oral at bedtime  dipyridamole 200 mG/aspirin 25 mG 1 Capsule(s) Oral two times a day  enoxaparin Injectable 40 milliGRAM(s) SubCutaneous daily  finasteride 5 milliGRAM(s) Oral daily  hydrochlorothiazide 25 milliGRAM(s) Oral daily  levETIRAcetam 1000 milliGRAM(s) Oral two times a day  losartan 100 milliGRAM(s) Oral daily  methylPREDNISolone sodium succinate IVPB 1000 milliGRAM(s) IV Intermittent daily  pantoprazole    Tablet 40 milliGRAM(s) Oral before breakfast    PRN MEDICATIONS  baclofen 10 milliGRAM(s) Oral every 8 hours PRN    VITALS:   T(F): 97.4  HR: 71  BP: 168/66  RR: 20  SpO2: 94%    LABS:                        13.9   23.40 )-----------( 490      ( 21 Oct 2018 07:21 )             41.4     10-21    141  |  101  |  36<H>  ----------------------------<  153<H>  3.9   |  22  |  1.2    Ca    9.3      21 Oct 2018 07:21          PHYSICAL EXAM:  GEN: No acute distress  LUNGS: Clear to auscultation bilaterally   HEART: Regular  ABD: Soft, non-tender, non-distended.  EXT: NC/NC/NE/2+PP/BAIRD/Skin Intact.   NEURO: AAOX3

## 2018-10-21 NOTE — CONSULT NOTE ADULT - SUBJECTIVE AND OBJECTIVE BOX
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 Vascular consulted for possible temporal artery biopsy. History provided by wife and patient (poor historians). Patient states he saw a flash of light and agrees that it felt as a curtain falling over his eye. HA and minimal pain in eye were associated with this though both resolved that day. Denies claudication, floaters, f/c, and redness in the eye. Wife says she noticed the issue due to the fact that patient kept on pointing at the right eye. Patient was seen by neurology and opthalmology. Optho recommended ERS/CRP to r/o temporal arteritis. CRP is 2.26, ESR 70, wbc is 23 from 7.5 yesterday. Patient has been afebrile since admission. 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       PAST MEDICAL & SURGICAL HISTORY:  Right sided weakness  High cholesterol  Enlarged prostate  HTN (hypertension)  Hemorrhagic stroke  Bilateral cataracts        MEDICATIONS  (STANDING):  amLODIPine   Tablet 10 milliGRAM(s) Oral daily  atorvastatin 80 milliGRAM(s) Oral at bedtime  dipyridamole 200 mG/aspirin 25 mG 1 Capsule(s) Oral two times a day  enoxaparin Injectable 40 milliGRAM(s) SubCutaneous daily  finasteride 5 milliGRAM(s) Oral daily  hydrochlorothiazide 25 milliGRAM(s) Oral daily  levETIRAcetam 1000 milliGRAM(s) Oral two times a day  losartan 100 milliGRAM(s) Oral daily  methylPREDNISolone sodium succinate IVPB 1000 milliGRAM(s) IV Intermittent daily  pantoprazole    Tablet 40 milliGRAM(s) Oral before breakfast    MEDICATIONS  (PRN):  baclofen 10 milliGRAM(s) Oral every 8 hours PRN Muscle Spasm      Allergies    penicillins (Unknown)    Intolerances    Vital Signs Last 24 Hrs  T(C): 36.1 (21 Oct 2018 16:18), Max: 36.3 (21 Oct 2018 07:57)  T(F): 96.9 (21 Oct 2018 16:18), Max: 97.4 (21 Oct 2018 07:57)  HR: 75 (21 Oct 2018 16:18) (71 - 75)  BP: 158/69 (21 Oct 2018 16:18) (131/61 - 168/66)  BP(mean): --  RR: 18 (21 Oct 2018 16:18) (18 - 20)  SpO2: 94% (21 Oct 2018 08:41) (94% - 94%)    PHYSICAL EXAM:  GENERAL: NAD, well-appearing  HEENT: b/l mitotic pupils, equal and reactive to light   CHEST/LUNG: no obvious increased wob   NEURO: L side weakness  VASC: b/l palpable radial and brachial pulses, pink and well perfused. brisk capillary refill   EXTREMITIES:  No clubbing, cyanosis, or edema      LABS:  Labs:  CAPILLARY BLOOD GLUCOSE                              13.9   23.40 )-----------( 490      ( 21 Oct 2018 07:21 )             41.4         10-21    141  |  101  |  36<H>  ----------------------------<  153<H>  3.9   |  22  |  1.2      Calcium, Total Serum: 9.3 mg/dL (10-21-18 @ 07:21)      LFTs:         Coags:                RADIOLOGY & ADDITIONAL STUDIES:  < from: MR Head w/wo IV Cont (10.20.18 @ 14:55) >  IMPRESSION:      1.  Prominent ventricles with ex vacuo dilatation of left lateral   ventricle due to left frontal, parietal and temporal lobe cortical   infarcts.    2.  Extensive periventricular and subcortical chronic small vessel   ischemic changes/white matter infarcts.    3.  Wallerian degeneration with of signal abnormality in the left mid   brain, cerebral peduncle extending back to the ino and medulla.    4.  No acute infarcts, intracranial hemorrhage or abnormal enhancement.     < end of copied text >

## 2018-10-21 NOTE — PROGRESS NOTE ADULT - ASSESSMENT
78y Male with h/o CVAs w/ residual spastic RHP and expressive aphasia presented to ED for c/o Right eye vision loss    #Vision loss - DDx CVA vs TA vs intraocular problem  MRI negative for acute CVA  c/w aggrenox  , lipitor 80 mg   d/w neuro - recommend vascular for temp artery biopsy  Opthalmology evaluation to r/o retinal causes of vision loss like retinal detachment   speech and swallow analysis   PT/rehab    #htn-stable   #hld-stable, f/u lipid profile  #dvt ppx -lovenox  #code status- was dnr/dni in p[ast, now wife wants to think about it .MOLST form provided to wife ,she will d/w her family. Until then full code , lives at home with wife 78y Male with h/o CVAs w/ residual spastic RHP and expressive aphasia presented to ED for c/o Right eye vision loss    #Vision loss - DDx CVA vs TA vs intraocular problem  MRI negative for acute CVA  c/w aggrenox  , lipitor 80 mg   d/w neuro - recommend vascular for temp artery biopsy  vascular consult placed and team informed  speech and swallow analysis       #htn-stable   #hld-stable, f/u lipid profile  #dvt ppx -lovenox  #code status- was dnr/dni in p[ast, now wife wants to think about it .MOLST form provided to wife ,she will d/w her family. Until then full code , lives at home with wife

## 2018-10-21 NOTE — PROGRESS NOTE ADULT - SUBJECTIVE AND OBJECTIVE BOX
Patient is a 78y old  Male who presents with a chief complaint of  rt eye vison loss (20 Oct 2018 20:29)      INTERVAL HPI/OVERNIGHT EVENTS: no new complaints  HPI:  78y Male with h/o hemorrhagic CVA w/ residual spastic RHP and expressive aphasia presented to ED for c/o Right eye vision loss  As per family , patient suddenly developed Right eye vision loss , painless this morning of presentation. He kept on pointing at the right eye. Denies fever, chills, headache, nausea, vomiting .  baseline bedbound, and constant right sided 0/5  since cva (18 Oct 2018 15:28)        MEDICATIONS  (STANDING):  amLODIPine   Tablet 10 milliGRAM(s) Oral daily  atorvastatin 80 milliGRAM(s) Oral at bedtime  dipyridamole 200 mG/aspirin 25 mG 1 Capsule(s) Oral two times a day  enoxaparin Injectable 40 milliGRAM(s) SubCutaneous daily  finasteride 5 milliGRAM(s) Oral daily  hydrochlorothiazide 25 milliGRAM(s) Oral daily  levETIRAcetam 1000 milliGRAM(s) Oral two times a day  losartan 100 milliGRAM(s) Oral daily  methylPREDNISolone sodium succinate IVPB 1000 milliGRAM(s) IV Intermittent daily  pantoprazole    Tablet 40 milliGRAM(s) Oral before breakfast    MEDICATIONS  (PRN):  baclofen 10 milliGRAM(s) Oral every 8 hours PRN Muscle Spasm    Home Medications:  Aggrenox 25 mg-200 mg oral capsule, extended release: 1 cap(s) orally 2 times a day (18 Oct 2018 15:34)  amLODIPine 10 mg oral tablet: 1 tab(s) orally once a day (18 Oct 2018 15:34)  atorvastatin 40 mg oral tablet: 1 tab(s) orally once a day (18 Oct 2018 15:34)  baclofen 10 mg oral tablet: 1 tab(s) orally once a day  Spasms - stroke h/x (18 Oct 2018 15:34)  clopidogrel 75 mg oral tablet: 1 tab(s) orally once a day (18 Oct 2018 15:34)  finasteride 5 mg oral tablet: 1 tab(s) orally once a day (18 Oct 2018 15:34)  hydroCHLOROthiazide 25 mg oral tablet: 1 tab(s) orally once a day (18 Oct 2018 15:34)  losartan 100 mg oral tablet: 1 tab(s) orally once a day (18 Oct 2018 15:34)      Vital Signs Last 24 Hrs  T(C): 36.3 (21 Oct 2018 07:57), Max: 36.6 (20 Oct 2018 15:01)  T(F): 97.4 (21 Oct 2018 07:57), Max: 97.9 (20 Oct 2018 15:01)  HR: 71 (21 Oct 2018 07:57) (71 - 77)  BP: 168/66 (21 Oct 2018 07:57) (119/57 - 168/66)  BP(mean): --  RR: 20 (21 Oct 2018 07:57) (18 - 20)  SpO2: 94% (21 Oct 2018 08:41) (94% - 94%)  CAPILLARY BLOOD GLUCOSE          General: NAD, alert, aphasic  CV: S1 S2  Resp: decreased breath sounds at base  GI: NT/ND/S +BS  MS: no clubbing/cyanosis/edema  Neuro: Rt 0/5, Rt vision loss    LABS:                        13.9   23.40 )-----------( 490      ( 21 Oct 2018 07:21 )             41.4     10-21    141  |  101  |  36<H>  ----------------------------<  153<H>  3.9   |  22  |  1.2    Ca    9.3      21 Oct 2018 07:21        Consultant(s) Notes Reviewed:  [x ] YES  [ ] NO    Care Discussed with Consultants/Other Providers/ Housestaff [ x] YES  [ ] NO    Radiology personally reviewed. Patient is a 78y old  Male who presents with a chief complaint of  rt eye vison loss (20 Oct 2018 20:29)      INTERVAL HPI/OVERNIGHT EVENTS: no new complaints  HPI:  78y Male with h/o CVA w/ residual spastic RHP and expressive aphasia presented to ED for c/o Right eye vision loss  As per family , patient suddenly developed Right eye vision loss , painless this morning of presentation. He kept on pointing at the right eye. Denies fever, chills, headache, nausea, vomiting .  baseline bedbound, and constant right sided 0/5  since cva (18 Oct 2018 15:28)        MEDICATIONS  (STANDING):  amLODIPine   Tablet 10 milliGRAM(s) Oral daily  atorvastatin 80 milliGRAM(s) Oral at bedtime  dipyridamole 200 mG/aspirin 25 mG 1 Capsule(s) Oral two times a day  enoxaparin Injectable 40 milliGRAM(s) SubCutaneous daily  finasteride 5 milliGRAM(s) Oral daily  hydrochlorothiazide 25 milliGRAM(s) Oral daily  levETIRAcetam 1000 milliGRAM(s) Oral two times a day  losartan 100 milliGRAM(s) Oral daily  methylPREDNISolone sodium succinate IVPB 1000 milliGRAM(s) IV Intermittent daily  pantoprazole    Tablet 40 milliGRAM(s) Oral before breakfast    MEDICATIONS  (PRN):  baclofen 10 milliGRAM(s) Oral every 8 hours PRN Muscle Spasm    Home Medications:  Aggrenox 25 mg-200 mg oral capsule, extended release: 1 cap(s) orally 2 times a day (18 Oct 2018 15:34)  amLODIPine 10 mg oral tablet: 1 tab(s) orally once a day (18 Oct 2018 15:34)  atorvastatin 40 mg oral tablet: 1 tab(s) orally once a day (18 Oct 2018 15:34)  baclofen 10 mg oral tablet: 1 tab(s) orally once a day  Spasms - stroke h/x (18 Oct 2018 15:34)  clopidogrel 75 mg oral tablet: 1 tab(s) orally once a day (18 Oct 2018 15:34)  finasteride 5 mg oral tablet: 1 tab(s) orally once a day (18 Oct 2018 15:34)  hydroCHLOROthiazide 25 mg oral tablet: 1 tab(s) orally once a day (18 Oct 2018 15:34)  losartan 100 mg oral tablet: 1 tab(s) orally once a day (18 Oct 2018 15:34)      Vital Signs Last 24 Hrs  T(C): 36.3 (21 Oct 2018 07:57), Max: 36.6 (20 Oct 2018 15:01)  T(F): 97.4 (21 Oct 2018 07:57), Max: 97.9 (20 Oct 2018 15:01)  HR: 71 (21 Oct 2018 07:57) (71 - 77)  BP: 168/66 (21 Oct 2018 07:57) (119/57 - 168/66)  BP(mean): --  RR: 20 (21 Oct 2018 07:57) (18 - 20)  SpO2: 94% (21 Oct 2018 08:41) (94% - 94%)  CAPILLARY BLOOD GLUCOSE          General: NAD, alert, aphasic  CV: S1 S2  Resp: decreased breath sounds at base  GI: NT/ND/S +BS  MS: no clubbing/cyanosis/edema  Neuro: Rt 0/5, Rt vision loss    LABS:                        13.9   23.40 )-----------( 490      ( 21 Oct 2018 07:21 )             41.4     10-21    141  |  101  |  36<H>  ----------------------------<  153<H>  3.9   |  22  |  1.2    Ca    9.3      21 Oct 2018 07:21        Consultant(s) Notes Reviewed:  [x ] YES  [ ] NO    Care Discussed with Consultants/Other Providers/ Housestaff [ x] YES  [ ] NO    Radiology personally reviewed.

## 2018-10-22 LAB — GLUCOSE BLDC GLUCOMTR-MCNC: 129 MG/DL — HIGH (ref 70–99)

## 2018-10-22 PROCEDURE — 99222 1ST HOSP IP/OBS MODERATE 55: CPT

## 2018-10-22 PROCEDURE — 93880 EXTRACRANIAL BILAT STUDY: CPT | Mod: 26

## 2018-10-22 RX ADMIN — Medication 116 MILLIGRAM(S): at 05:19

## 2018-10-22 RX ADMIN — ENOXAPARIN SODIUM 40 MILLIGRAM(S): 100 INJECTION SUBCUTANEOUS at 11:31

## 2018-10-22 RX ADMIN — FINASTERIDE 5 MILLIGRAM(S): 5 TABLET, FILM COATED ORAL at 11:31

## 2018-10-22 RX ADMIN — LEVETIRACETAM 1000 MILLIGRAM(S): 250 TABLET, FILM COATED ORAL at 17:29

## 2018-10-22 RX ADMIN — LEVETIRACETAM 1000 MILLIGRAM(S): 250 TABLET, FILM COATED ORAL at 05:11

## 2018-10-22 RX ADMIN — Medication 10 MILLIGRAM(S): at 05:19

## 2018-10-22 RX ADMIN — Medication 10 MILLIGRAM(S): at 17:30

## 2018-10-22 RX ADMIN — LOSARTAN POTASSIUM 100 MILLIGRAM(S): 100 TABLET, FILM COATED ORAL at 05:11

## 2018-10-22 RX ADMIN — ASPIRIN AND DIPYRIDAMOLE 1 CAPSULE(S): 25; 200 CAPSULE, EXTENDED RELEASE ORAL at 17:29

## 2018-10-22 RX ADMIN — PANTOPRAZOLE SODIUM 40 MILLIGRAM(S): 20 TABLET, DELAYED RELEASE ORAL at 05:11

## 2018-10-22 RX ADMIN — ASPIRIN AND DIPYRIDAMOLE 1 CAPSULE(S): 25; 200 CAPSULE, EXTENDED RELEASE ORAL at 05:11

## 2018-10-22 RX ADMIN — ATORVASTATIN CALCIUM 80 MILLIGRAM(S): 80 TABLET, FILM COATED ORAL at 21:35

## 2018-10-22 RX ADMIN — Medication 25 MILLIGRAM(S): at 05:11

## 2018-10-22 RX ADMIN — AMLODIPINE BESYLATE 10 MILLIGRAM(S): 2.5 TABLET ORAL at 05:11

## 2018-10-22 NOTE — PROGRESS NOTE ADULT - SUBJECTIVE AND OBJECTIVE BOX
SUBJECTIVE:    Patient is a 78y old Male who presents with a chief complaint of stroke (22 Oct 2018 17:22)      HPI:  78y Male with h/o hemorrhagic CVA w/ residual spastic RHP and expressive aphasia presented to ED for c/o Right eye vision loss  As per family , patient suddenly developed Right eye vision loss , painless this morning of presentation. He kept on pointing at the right eye. Denies fever, chills, headache, nausea, vomiting .  baseline bedbound, and constant right sided 0/5  since cva (18 Oct 2018 15:28)      Currently admitted to medicine with the primary diagnosis of Vision changes       Besides the pertinent positives and negatives described above, the ROS was within normal limits.    PAST MEDICAL & SURGICAL HISTORY  Right sided weakness  High cholesterol  Enlarged prostate  HTN (hypertension)  Hemorrhagic stroke  Bilateral cataracts    SOCIAL HISTORY:    ALLERGIES:  penicillins (Unknown)    MEDICATIONS:  STANDING MEDICATIONS  amLODIPine   Tablet 10 milliGRAM(s) Oral daily  atorvastatin 80 milliGRAM(s) Oral at bedtime  dipyridamole 200 mG/aspirin 25 mG 1 Capsule(s) Oral two times a day  enoxaparin Injectable 40 milliGRAM(s) SubCutaneous daily  finasteride 5 milliGRAM(s) Oral daily  hydrochlorothiazide 25 milliGRAM(s) Oral daily  levETIRAcetam 1000 milliGRAM(s) Oral two times a day  losartan 100 milliGRAM(s) Oral daily  methylPREDNISolone sodium succinate Injectable 70 milliGRAM(s) IV Push daily  pantoprazole    Tablet 40 milliGRAM(s) Oral before breakfast    PRN MEDICATIONS  baclofen 10 milliGRAM(s) Oral every 8 hours PRN    VITALS:   T(F): 96.5  HR: 77  BP: 147/65  RR: 18  SpO2: --    LABS:                        13.9   23.40 )-----------( 490      ( 21 Oct 2018 07:21 )             41.4     10-21    141  |  101  |  36<H>  ----------------------------<  153<H>  3.9   |  22  |  1.2    Ca    9.3      21 Oct 2018 07:21                    RADIOLOGY:    PHYSICAL EXAM:  GEN: No acute distress  LUNGS: Clear to auscultation bilaterally   HEART: Regular  ABD: Soft, non-tender, non-distended.  EXT: NC/NC/NE/2+PP/BAIRD/Skin Intact.   NEURO: AAOX3, no vision in right eye    Intravenous access:   NG tube:   [place] Catheter:

## 2018-10-22 NOTE — PROGRESS NOTE ADULT - SUBJECTIVE AND OBJECTIVE BOX
ROS: Gautamies CP, SOB, AP    MEDICATIONS  (STANDING):  amLODIPine   Tablet 10 milliGRAM(s) Oral daily  atorvastatin 80 milliGRAM(s) Oral at bedtime  dipyridamole 200 mG/aspirin 25 mG 1 Capsule(s) Oral two times a day  enoxaparin Injectable 40 milliGRAM(s) SubCutaneous daily  finasteride 5 milliGRAM(s) Oral daily  hydrochlorothiazide 25 milliGRAM(s) Oral daily  levETIRAcetam 1000 milliGRAM(s) Oral two times a day  losartan 100 milliGRAM(s) Oral daily  methylPREDNISolone sodium succinate Injectable 70 milliGRAM(s) IV Push daily  pantoprazole    Tablet 40 milliGRAM(s) Oral before breakfast    MEDICATIONS  (PRN):  baclofen 10 milliGRAM(s) Oral every 8 hours PRN Muscle Spasm    Home Medications:  Aggrenox 25 mg-200 mg oral capsule, extended release: 1 cap(s) orally 2 times a day (18 Oct 2018 15:34)  amLODIPine 10 mg oral tablet: 1 tab(s) orally once a day (18 Oct 2018 15:34)  atorvastatin 40 mg oral tablet: 1 tab(s) orally once a day (18 Oct 2018 15:34)  baclofen 10 mg oral tablet: 1 tab(s) orally once a day  Spasms - stroke h/x (18 Oct 2018 15:34)  clopidogrel 75 mg oral tablet: 1 tab(s) orally once a day (18 Oct 2018 15:34)  finasteride 5 mg oral tablet: 1 tab(s) orally once a day (18 Oct 2018 15:34)  hydroCHLOROthiazide 25 mg oral tablet: 1 tab(s) orally once a day (18 Oct 2018 15:34)  losartan 100 mg oral tablet: 1 tab(s) orally once a day (18 Oct 2018 15:34)    Vital Signs Last 24 Hrs  T(C): 35.8 (22 Oct 2018 15:49), Max: 35.8 (22 Oct 2018 07:36)  T(F): 96.5 (22 Oct 2018 15:49), Max: 96.5 (22 Oct 2018 15:49)  HR: 77 (22 Oct 2018 15:49) (68 - 77)  BP: 147/65 (22 Oct 2018 15:49) (143/65 - 155/71)  BP(mean): --  RR: 18 (22 Oct 2018 15:49) (18 - 20)  SpO2: --  CAPILLARY BLOOD GLUCOSE      POCT Blood Glucose.: 129 mg/dL (22 Oct 2018 06:35)    LABS:                        13.9   23.40 )-----------( 490      ( 21 Oct 2018 07:21 )             41.4     10-21    141  |  101  |  36<H>  ----------------------------<  153<H>  3.9   |  22  |  1.2    Ca    9.3      21 Oct 2018 07:21                        Consultant(s) Notes Reviewed:  [x ] YES  [ ] NO  Care Discussed with Consultants/Other Providers/ Housestaff [ x] YES  [ ] NO  Radiology personally reviewed.                        Patient is a 78y old  Male who presents with a chief complaint of  rt eye vison loss (20 Oct 2018 20:29)      INTERVAL HPI/OVERNIGHT EVENTS: no new complaints  HPI:  78y Male with h/o CVA w/ residual spastic RHP and expressive aphasia presented to ED for c/o Right eye vision loss  As per family , patient suddenly developed Right eye vision loss , painless this morning of presentation. He kept on pointing at the right eye. Denies fever, chills, headache, nausea, vomiting .baseline bedbound, and constant right sided 0/5  since cva (18 Oct 2018 15:28)      General: NAD, alert, aphasic  CV: S1 S2  Resp: decreased breath sounds at base  GI: NT/ND/S +BS  MS: no clubbing/cyanosis/edema  Neuro: Rt 0/5, Rt vision loss

## 2018-10-22 NOTE — PROGRESS NOTE ADULT - ASSESSMENT
78 yoM with history of CVA with residual right sided spasticity and expressive aphasia presents with right sided vision loss and mild left sided vision loss that resolved before solumedrol    -CT/MRI negative for acute changes  -ESR/CRP elevated  -Ophtho consulted and likely temporal arteritis due to bilateral optic nerve edema  -recommending temporal artery biopsy    # Carotid stenosis  -bilateral stenosis with 80% left carotid and 40% right carotid    # HTN  -amlodipine 10/hctz 25/losartan 100    # BPH  -finasteride 5    # HLD  -atorvastatin 80    DVT GI PPX

## 2018-10-22 NOTE — PROGRESS NOTE ADULT - ASSESSMENT
78y Male with h/o CVAs w/ residual spastic RHP and expressive aphasia presented to ED for c/o Right eye vision loss    #Vision loss - DDx TA vs intraocular problem  MRI negative for acute CVA  c/w aggrenox  , lipitor 80 mg   d/w neuro - recommend vascular for temp artery biopsy  Opthalmology evaluation to r/o retinal causes of vision loss like retinal detachment   speech and swallow analysis   PT/rehab    #LICA stenosis - f/u carotid u/s  #htn-stable   #hld-stable, f/u lipid profile  #dvt ppx -lovenox  #code status- was dnr/dni in p[ast, now wife wants to think about it .MOLST form provided to wife ,she will d/w her family. Until then full code , lives at home with wife

## 2018-10-22 NOTE — SWALLOW BEDSIDE ASSESSMENT ADULT - SWALLOW EVAL: CURRENT DIET
Chief Complaint   Patient presents with     Lung Transplant     Patient is being seen for post lung tx follow up      Clari Fortune CMA at 9:39 AM on 5/22/2017     Regular with thin liquids

## 2018-10-22 NOTE — CONSULT NOTE ADULT - SUBJECTIVE AND OBJECTIVE BOX
YAHIARA MONTIEL  [id number]  78y Male      Patient is a 78y old  Male who presents with a chief complaint of vision loss (19 Oct 2018 11:14)    HPI:  78y Male with h/o hemorrhagic CVA w/ residual spastic RHP and expressive aphasia presented to ED for c/o Right eye vision loss  As per family , patient suddenly developed Right eye vision loss , painless this morning of presentation. He kept on pointing at the right eye. Denies fever, chills, headache, nausea, vomiting .  baseline bedbound, and constant right sided 0/5  since cva (18 Oct 2018 15:28)      Extended HPI: Pt c h/o vision loss OD, suspected GCA here for a f/u. Pt reports that headaches have improved but continues to c/o vision loss OD. Pt reports some blurry vision OS.     PAST MEDICAL & SURGICAL HISTORY:  Right sided weakness  High cholesterol  Enlarged prostate  HTN (hypertension)  Hemorrhagic stroke  Bilateral cataracts    MEDICATIONS  (STANDING):  amLODIPine   Tablet 10 milliGRAM(s) Oral daily  atorvastatin 80 milliGRAM(s) Oral at bedtime  dipyridamole 200 mG/aspirin 25 mG 1 Capsule(s) Oral two times a day  enoxaparin Injectable 40 milliGRAM(s) SubCutaneous daily  finasteride 5 milliGRAM(s) Oral daily  hydrochlorothiazide 25 milliGRAM(s) Oral daily  levETIRAcetam 1000 milliGRAM(s) Oral two times a day  losartan 100 milliGRAM(s) Oral daily  pantoprazole    Tablet 40 milliGRAM(s) Oral before breakfast    MEDICATIONS  (PRN):  baclofen 10 milliGRAM(s) Oral every 8 hours PRN Muscle Spasm    Allergies  penicillins (Unknown)    Intolerances  none listed     POHx:  CIELO: >5 years with Dr. Ordoñez  Pseudophakia OU  Refractive Error OU  (-)injuries    Ocular Medications: none    FOHx: Non-contributory    VISUAL ACUITY c near card   OD: sc NLP PH: NI  OS sc 10/16 PH: NI    NEURO TESTING  Pupils: 	PERRL, miotic pupils OD/OS (+)RAPD   EOMS: 	FULL OD/OS  CVF: 	OD: NLP; OS: grossly wnl    ANTERIOR SEGMENT EVALUATION:   lids/lashes: 	clear OD/OS  conjunctiva: 	clear OD/OS  cornea: 	clear OD/OS  anterior chamber: deep & quiet OD/OS  iris: 	flat and even OD/OS    INTRAOCULAR PRESSURE  Tonopen  OD: 16 mmHg  OS:  16 mmHg  @1:14pm    POSTERIOR SEGMENT EVALUATION  Dilated: Tropicamide 1%, Phenylephrine 2.5% OD/OS x 2 (poor dilation)  Lens: 		PCIOL, clear OD/OS  Vitreous: 	clear OD/OS  Optic nerve:	OS: 3+ pallid edema, OS: distinct, pink and healthy OD/OS  Macula: 	flat, even OD/OS  Vessels: 	2:3 OD/OS  Periphery: 	flat, (-)holes/breaks/tears 360 OD/OS YAHAIRA MONTIEL  [id number]  78y Male      Patient is a 78y old  Male who presents with a chief complaint of vision loss (19 Oct 2018 11:14)    HPI:  78y Male with h/o hemorrhagic CVA w/ residual spastic RHP and expressive aphasia presented to ED for c/o Right eye vision loss  As per family , patient suddenly developed Right eye vision loss , painless this morning of presentation. He kept on pointing at the right eye. Denies fever, chills, headache, nausea, vomiting .  baseline bedbound, and constant right sided 0/5  since cva (18 Oct 2018 15:28)      Extended HPI: Pt c h/o vision loss OD, suspected GCA here for a f/u. Pt reports that headaches have improved but continues to c/o vision loss OD. Pt reports some blurry vision OS starting today, although Pt reports it has improved throughout the day. Pt denies other visual/ocular symptoms    PAST MEDICAL & SURGICAL HISTORY:  Right sided weakness  High cholesterol  Enlarged prostate  HTN (hypertension)  Hemorrhagic stroke  Bilateral cataracts    MEDICATIONS  (STANDING):  amLODIPine   Tablet 10 milliGRAM(s) Oral daily  atorvastatin 80 milliGRAM(s) Oral at bedtime  dipyridamole 200 mG/aspirin 25 mG 1 Capsule(s) Oral two times a day  enoxaparin Injectable 40 milliGRAM(s) SubCutaneous daily  finasteride 5 milliGRAM(s) Oral daily  hydrochlorothiazide 25 milliGRAM(s) Oral daily  levETIRAcetam 1000 milliGRAM(s) Oral two times a day  losartan 100 milliGRAM(s) Oral daily  pantoprazole    Tablet 40 milliGRAM(s) Oral before breakfast    MEDICATIONS  (PRN):  baclofen 10 milliGRAM(s) Oral every 8 hours PRN Muscle Spasm    Allergies  penicillins (Unknown)    Intolerances  none listed     POHx:  CIELO: >5 years with Dr. Ordoñez  Pseudophakia OU  Refractive Error OU  (-)injuries    Ocular Medications: none    FOHx: Non-contributory    VISUAL ACUITY c near card   OD: sc NLP PH: NI  OS sc 10/16 PH: NI    NEURO TESTING  Pupils: 	PERRL, miotic pupils OD/OS (+)RAPD   EOMS: 	FULL OD/OS  CVF: 	OD: NLP; OS: grossly wnl    ANTERIOR SEGMENT EVALUATION:   lids/lashes: 	clear OD/OS  conjunctiva: 	clear OD/OS  cornea: 	clear OD/OS  anterior chamber: deep & quiet OD/OS  iris: 	flat and even OD/OS    INTRAOCULAR PRESSURE  Tonopen  OD: 16 mmHg  OS:  16 mmHg  @1:14pm    POSTERIOR SEGMENT EVALUATION  Dilated: Tropicamide 1%, Phenylephrine 2.5% OD/OS x 2 (poor dilation)  Lens: 		PCIOL, clear OD/OS  Vitreous: 	clear OD/OS  Optic nerve:	OS: 3+ pallid edema, OS: small crowded disc, 1+nasal edema OD/OS  Macula: 	flat, even OD/OS  Vessels: 	1+ tortuosity OD/OS  Periphery: 	flat, (-)holes/breaks/tears 360 OD/OS    SUPPLEMENTARY TESTING  Fluorescein angiogram   OD: disc leakage  OS: disc staining, lesions staining superior and temporal

## 2018-10-22 NOTE — CONSULT NOTE ADULT - ASSESSMENT
1) Optic Neuropathy OD  - r/o temporal arteritis  - ESR, CRP elevated 1)  Bilateral Optic Nerve Edema, High suspicion for temporal arteritis   - Pt seen be retinal specialist Dr. Mcknight today, optic nerve edema confirmed with fluorescein angiogram  - bloodwork shows elevated ESR, CRP  - continue steroids  - Temporal artery biopsy   - Rheumatology consult  - f/u with patient in 1 week; can be done at outpatient to be scheduled for Dr. Mcknight's private practice

## 2018-10-22 NOTE — SWALLOW BEDSIDE ASSESSMENT ADULT - SLP PERTINENT HISTORY OF CURRENT PROBLEM
Pt admitted with vision changes. MRI (-) for acute changes. Wallerian degeneration signal abnormality L midbrain, cerebral peduncle extending back to ino and medulla. PMHx: hemorrhagic CVA 2/2017; VFSS 2/2017 recs for regular with thin liquids via consecutive swallows

## 2018-10-22 NOTE — SWALLOW BEDSIDE ASSESSMENT ADULT - SWALLOW EVAL: DIAGNOSIS
Pts wife at bedside refusing all SLP services stating "He eats fine". Max education provided, wife continues to refuse services, MD made aware

## 2018-10-23 LAB
ALBUMIN SERPL ELPH-MCNC: 3.6 G/DL — SIGNIFICANT CHANGE UP (ref 3.5–5.2)
ALP SERPL-CCNC: 82 U/L — SIGNIFICANT CHANGE UP (ref 30–115)
ALT FLD-CCNC: 14 U/L — SIGNIFICANT CHANGE UP (ref 0–41)
ANION GAP SERPL CALC-SCNC: 16 MMOL/L — HIGH (ref 7–14)
ANION GAP SERPL CALC-SCNC: 19 MMOL/L — HIGH (ref 7–14)
APTT BLD: 36.6 SEC — SIGNIFICANT CHANGE UP (ref 27–39.2)
AST SERPL-CCNC: 8 U/L — SIGNIFICANT CHANGE UP (ref 0–41)
BASOPHILS # BLD AUTO: 0.02 K/UL — SIGNIFICANT CHANGE UP (ref 0–0.2)
BASOPHILS NFR BLD AUTO: 0.1 % — SIGNIFICANT CHANGE UP (ref 0–1)
BILIRUB SERPL-MCNC: 0.3 MG/DL — SIGNIFICANT CHANGE UP (ref 0.2–1.2)
BLD GP AB SCN SERPL QL: SIGNIFICANT CHANGE UP
BUN SERPL-MCNC: 47 MG/DL — HIGH (ref 10–20)
BUN SERPL-MCNC: 48 MG/DL — HIGH (ref 10–20)
CALCIUM SERPL-MCNC: 8.4 MG/DL — LOW (ref 8.5–10.1)
CALCIUM SERPL-MCNC: 8.6 MG/DL — SIGNIFICANT CHANGE UP (ref 8.5–10.1)
CHLORIDE SERPL-SCNC: 104 MMOL/L — SIGNIFICANT CHANGE UP (ref 98–110)
CHLORIDE SERPL-SCNC: 97 MMOL/L — LOW (ref 98–110)
CO2 SERPL-SCNC: 23 MMOL/L — SIGNIFICANT CHANGE UP (ref 17–32)
CO2 SERPL-SCNC: 25 MMOL/L — SIGNIFICANT CHANGE UP (ref 17–32)
CREAT SERPL-MCNC: 1.2 MG/DL — SIGNIFICANT CHANGE UP (ref 0.7–1.5)
CREAT SERPL-MCNC: 1.3 MG/DL — SIGNIFICANT CHANGE UP (ref 0.7–1.5)
EOSINOPHIL # BLD AUTO: 0 K/UL — SIGNIFICANT CHANGE UP (ref 0–0.7)
EOSINOPHIL NFR BLD AUTO: 0 % — SIGNIFICANT CHANGE UP (ref 0–8)
GLUCOSE SERPL-MCNC: 130 MG/DL — HIGH (ref 70–99)
GLUCOSE SERPL-MCNC: 164 MG/DL — HIGH (ref 70–99)
HCT VFR BLD CALC: 40.2 % — LOW (ref 42–52)
HGB BLD-MCNC: 13.5 G/DL — LOW (ref 14–18)
IMM GRANULOCYTES NFR BLD AUTO: 1.5 % — HIGH (ref 0.1–0.3)
INR BLD: 1.05 RATIO — SIGNIFICANT CHANGE UP (ref 0.65–1.3)
LYMPHOCYTES # BLD AUTO: 1.2 K/UL — SIGNIFICANT CHANGE UP (ref 1.2–3.4)
LYMPHOCYTES # BLD AUTO: 6.7 % — LOW (ref 20.5–51.1)
MAGNESIUM SERPL-MCNC: 2.3 MG/DL — SIGNIFICANT CHANGE UP (ref 1.8–2.4)
MCHC RBC-ENTMCNC: 29.4 PG — SIGNIFICANT CHANGE UP (ref 27–31)
MCHC RBC-ENTMCNC: 33.6 G/DL — SIGNIFICANT CHANGE UP (ref 32–37)
MCV RBC AUTO: 87.6 FL — SIGNIFICANT CHANGE UP (ref 80–94)
MONOCYTES # BLD AUTO: 1.88 K/UL — HIGH (ref 0.1–0.6)
MONOCYTES NFR BLD AUTO: 10.5 % — HIGH (ref 1.7–9.3)
NEUTROPHILS # BLD AUTO: 14.5 K/UL — HIGH (ref 1.4–6.5)
NEUTROPHILS NFR BLD AUTO: 81.2 % — HIGH (ref 42.2–75.2)
NRBC # BLD: 0 /100 WBCS — SIGNIFICANT CHANGE UP (ref 0–0)
PLATELET # BLD AUTO: 422 K/UL — HIGH (ref 130–400)
POTASSIUM SERPL-MCNC: 3.3 MMOL/L — LOW (ref 3.5–5)
POTASSIUM SERPL-MCNC: 4.1 MMOL/L — SIGNIFICANT CHANGE UP (ref 3.5–5)
POTASSIUM SERPL-SCNC: 3.3 MMOL/L — LOW (ref 3.5–5)
POTASSIUM SERPL-SCNC: 4.1 MMOL/L — SIGNIFICANT CHANGE UP (ref 3.5–5)
PROT SERPL-MCNC: 6.1 G/DL — SIGNIFICANT CHANGE UP (ref 6–8)
PROTHROM AB SERPL-ACNC: 12.1 SEC — SIGNIFICANT CHANGE UP (ref 9.95–12.87)
RBC # BLD: 4.59 M/UL — LOW (ref 4.7–6.1)
RBC # FLD: 13.8 % — SIGNIFICANT CHANGE UP (ref 11.5–14.5)
SODIUM SERPL-SCNC: 141 MMOL/L — SIGNIFICANT CHANGE UP (ref 135–146)
SODIUM SERPL-SCNC: 143 MMOL/L — SIGNIFICANT CHANGE UP (ref 135–146)
TYPE + AB SCN PNL BLD: SIGNIFICANT CHANGE UP
WBC # BLD: 17.86 K/UL — HIGH (ref 4.8–10.8)
WBC # FLD AUTO: 17.86 K/UL — HIGH (ref 4.8–10.8)

## 2018-10-23 RX ORDER — SODIUM CHLORIDE 9 MG/ML
1000 INJECTION INTRAMUSCULAR; INTRAVENOUS; SUBCUTANEOUS
Qty: 0 | Refills: 0 | Status: DISCONTINUED | OUTPATIENT
Start: 2018-10-23 | End: 2018-10-23

## 2018-10-23 RX ORDER — SODIUM CHLORIDE 9 MG/ML
1000 INJECTION INTRAMUSCULAR; INTRAVENOUS; SUBCUTANEOUS
Qty: 0 | Refills: 0 | Status: DISCONTINUED | OUTPATIENT
Start: 2018-10-24 | End: 2018-10-24

## 2018-10-23 RX ORDER — ACETAMINOPHEN 500 MG
650 TABLET ORAL EVERY 6 HOURS
Qty: 0 | Refills: 0 | Status: DISCONTINUED | OUTPATIENT
Start: 2018-10-23 | End: 2018-10-24

## 2018-10-23 RX ORDER — SENNA PLUS 8.6 MG/1
2 TABLET ORAL AT BEDTIME
Qty: 0 | Refills: 0 | Status: DISCONTINUED | OUTPATIENT
Start: 2018-10-23 | End: 2018-10-24

## 2018-10-23 RX ORDER — DOCUSATE SODIUM 100 MG
100 CAPSULE ORAL DAILY
Qty: 0 | Refills: 0 | Status: DISCONTINUED | OUTPATIENT
Start: 2018-10-23 | End: 2018-10-24

## 2018-10-23 RX ORDER — POTASSIUM CHLORIDE 20 MEQ
40 PACKET (EA) ORAL ONCE
Qty: 0 | Refills: 0 | Status: COMPLETED | OUTPATIENT
Start: 2018-10-23 | End: 2018-10-23

## 2018-10-23 RX ADMIN — Medication 10 MILLIGRAM(S): at 17:21

## 2018-10-23 RX ADMIN — ENOXAPARIN SODIUM 40 MILLIGRAM(S): 100 INJECTION SUBCUTANEOUS at 11:10

## 2018-10-23 RX ADMIN — FINASTERIDE 5 MILLIGRAM(S): 5 TABLET, FILM COATED ORAL at 11:10

## 2018-10-23 RX ADMIN — Medication 40 MILLIEQUIVALENT(S): at 09:25

## 2018-10-23 RX ADMIN — Medication 25 MILLIGRAM(S): at 06:37

## 2018-10-23 RX ADMIN — Medication 70 MILLIGRAM(S): at 06:38

## 2018-10-23 RX ADMIN — ATORVASTATIN CALCIUM 80 MILLIGRAM(S): 80 TABLET, FILM COATED ORAL at 21:21

## 2018-10-23 RX ADMIN — LEVETIRACETAM 1000 MILLIGRAM(S): 250 TABLET, FILM COATED ORAL at 17:05

## 2018-10-23 RX ADMIN — AMLODIPINE BESYLATE 10 MILLIGRAM(S): 2.5 TABLET ORAL at 06:37

## 2018-10-23 RX ADMIN — SENNA PLUS 2 TABLET(S): 8.6 TABLET ORAL at 21:21

## 2018-10-23 RX ADMIN — PANTOPRAZOLE SODIUM 40 MILLIGRAM(S): 20 TABLET, DELAYED RELEASE ORAL at 06:36

## 2018-10-23 RX ADMIN — ASPIRIN AND DIPYRIDAMOLE 1 CAPSULE(S): 25; 200 CAPSULE, EXTENDED RELEASE ORAL at 06:37

## 2018-10-23 RX ADMIN — Medication 10 MILLIGRAM(S): at 06:48

## 2018-10-23 RX ADMIN — Medication 100 MILLIGRAM(S): at 17:05

## 2018-10-23 RX ADMIN — Medication 650 MILLIGRAM(S): at 08:47

## 2018-10-23 RX ADMIN — Medication 650 MILLIGRAM(S): at 09:21

## 2018-10-23 RX ADMIN — LEVETIRACETAM 1000 MILLIGRAM(S): 250 TABLET, FILM COATED ORAL at 06:37

## 2018-10-23 RX ADMIN — LOSARTAN POTASSIUM 100 MILLIGRAM(S): 100 TABLET, FILM COATED ORAL at 06:36

## 2018-10-23 NOTE — CHART NOTE - NSCHARTNOTEFT_GEN_A_CORE
Preop Dx: Temporal Arteritis  Surgeon: Dr. Soni/Dr. Contreras/Dr. Delong/Dr. Hill  Procedure: Right temporal artery Biopsy    Vital Signs Last 24 Hrs  T(C): 35.9 (23 Oct 2018 07:38), Max: 36.6 (22 Oct 2018 17:22)  T(F): 96.7 (23 Oct 2018 07:38), Max: 97.9 (22 Oct 2018 17:22)  HR: 73 (23 Oct 2018 07:38) (67 - 77)  BP: 125/59 (23 Oct 2018 07:38) (123/57 - 159/70)  RR: 18 (23 Oct 2018 07:38) (18 - 18)  SpO2: 95% (23 Oct 2018 07:27) (95% - 95%)                        13.5   17.86 )-----------( 422      ( 23 Oct 2018 06:11 )             40.2     10-23    143  |  104  |  48<H>  ----------------------------<  130<H>  3.3<L>   |  23  |  1.2    Ca    8.4<L>      23 Oct 2018 06:11  Mg     2.3     10-23    TPro  6.1  /  Alb  3.6  /  TBili  0.3  /  DBili  x   /  AST  8   /  ALT  14  /  AlkPhos  82  10-23      Daily Height in cm: 162.56 (22 Oct 2018 17:22)      EKG: Done  CXR: Done  Type and Screen: Ordered for 1600 today  Coags: Ordered for 1600 today    Plan:  - OR 10/24/2018 for R Temopral Artery biopsy with Dr. Soni/Dr. Contreras/Dr. Delong/Dr. Hill  - NPO after midnight  - IVF: NS at 75  - Hold lovenox for tomorrow  - Do not give dipyridamole today and tomorrow (keep holding off)  - Consent done/pending Preop Dx: Temporal Arteritis  Surgeon: Dr. Soni/Dr. Contreras/Dr. Delong/Dr. Hill  Procedure: Right temporal artery Biopsy    Vital Signs Last 24 Hrs  T(C): 35.9 (23 Oct 2018 07:38), Max: 36.6 (22 Oct 2018 17:22)  T(F): 96.7 (23 Oct 2018 07:38), Max: 97.9 (22 Oct 2018 17:22)  HR: 73 (23 Oct 2018 07:38) (67 - 77)  BP: 125/59 (23 Oct 2018 07:38) (123/57 - 159/70)  RR: 18 (23 Oct 2018 07:38) (18 - 18)  SpO2: 95% (23 Oct 2018 07:27) (95% - 95%)                        13.5   17.86 )-----------( 422      ( 23 Oct 2018 06:11 )             40.2     10-23    143  |  104  |  48<H>  ----------------------------<  130<H>  3.3<L>   |  23  |  1.2    Ca    8.4<L>      23 Oct 2018 06:11  Mg     2.3     10-23    TPro  6.1  /  Alb  3.6  /  TBili  0.3  /  DBili  x   /  AST  8   /  ALT  14  /  AlkPhos  82  10-23      Daily Height in cm: 162.56 (22 Oct 2018 17:22)      EKG: Done  CXR: Done  Type and Screen: Ordered for 1600 today  Coags: Ordered for 1600 today    Plan:  - OR 10/24/2018 for R Temopral Artery biopsy with Dr. Soni/Dr. Contreras/Dr. Delong/Dr. Hill  - NPO after midnight  - IVF: NS at 75  - Hold lovenox for tomorrow  - Do not give dipyridamole today and tomorrow (keep holding off)  - Consent done/pending  - Replete K and other BMP as needed. Preop Dx: Temporal Arteritis  Surgeon: Dr. Soni/Dr. Contreras/Dr. Delong/Dr. Hill  Procedure: Right temporal artery Biopsy    Vital Signs Last 24 Hrs  T(C): 35.9 (23 Oct 2018 07:38), Max: 36.6 (22 Oct 2018 17:22)  T(F): 96.7 (23 Oct 2018 07:38), Max: 97.9 (22 Oct 2018 17:22)  HR: 73 (23 Oct 2018 07:38) (67 - 77)  BP: 125/59 (23 Oct 2018 07:38) (123/57 - 159/70)  RR: 18 (23 Oct 2018 07:38) (18 - 18)  SpO2: 95% (23 Oct 2018 07:27) (95% - 95%)                        13.5   17.86 )-----------( 422      ( 23 Oct 2018 06:11 )             40.2     10-23    143  |  104  |  48<H>  ----------------------------<  130<H>  3.3<L>   |  23  |  1.2    Ca    8.4<L>      23 Oct 2018 06:11  Mg     2.3     10-23    TPro  6.1  /  Alb  3.6  /  TBili  0.3  /  DBili  x   /  AST  8   /  ALT  14  /  AlkPhos  82  10-23      Daily Height in cm: 162.56 (22 Oct 2018 17:22)      EKG: Done  CXR: Done  Type and Screen: Ordered for 1600 today  Coags: Ordered for 1600 today    Plan:  - OR 10/24/2018 for R Temopral Artery biopsy with Dr. Soni/Dr. Contreras/Dr. Delong/Dr. Hill  - NPO after midnight  - IVF: NS at 75  - Hold lovenox for tomorrow  - Do not give dipyridamole today and tomorrow (keep holding off)  - Consent done and in the chart  - Replete K and other BMP as needed.

## 2018-10-23 NOTE — PROGRESS NOTE ADULT - SUBJECTIVE AND OBJECTIVE BOX
Patient is a 78y old  Male who presents with a chief complaint of  rt eye vison loss (20 Oct 2018 20:29)      INTERVAL HPI/OVERNIGHT EVENTS: no new complaints  HPI:  78y Male with h/o CVA w/ residual spastic RHP and expressive aphasia presented to ED for c/o Right eye vision loss  As per family , patient suddenly developed Right eye vision loss , painless this morning of presentation. He kept on pointing at the right eye. Denies fever, chills, headache, nausea, vomiting .baseline bedbound, and constant right sided 0/5  since cva (18 Oct 2018 15:28)      General: NAD, alert, aphasic  CV: S1 S2  Resp: decreased breath sounds at base  GI: NT/ND/S +BS  MS: no clubbing/cyanosis/edema  Neuro: Rt 0/5, Rt vision loss        ROS: Denies CP, SOB, AP    MEDICATIONS  (STANDING):  amLODIPine   Tablet 10 milliGRAM(s) Oral daily  atorvastatin 80 milliGRAM(s) Oral at bedtime  docusate sodium 100 milliGRAM(s) Oral daily  finasteride 5 milliGRAM(s) Oral daily  hydrochlorothiazide 25 milliGRAM(s) Oral daily  levETIRAcetam 1000 milliGRAM(s) Oral two times a day  losartan 100 milliGRAM(s) Oral daily  methylPREDNISolone sodium succinate Injectable 70 milliGRAM(s) IV Push daily  pantoprazole    Tablet 40 milliGRAM(s) Oral before breakfast  senna 2 Tablet(s) Oral at bedtime    MEDICATIONS  (PRN):  acetaminophen   Tablet .. 650 milliGRAM(s) Oral every 6 hours PRN Mild Pain (1 - 3)  baclofen 10 milliGRAM(s) Oral every 8 hours PRN Muscle Spasm    Home Medications:  Aggrenox 25 mg-200 mg oral capsule, extended release: 1 cap(s) orally 2 times a day (18 Oct 2018 15:34)  amLODIPine 10 mg oral tablet: 1 tab(s) orally once a day (18 Oct 2018 15:34)  atorvastatin 40 mg oral tablet: 1 tab(s) orally once a day (18 Oct 2018 15:34)  baclofen 10 mg oral tablet: 1 tab(s) orally once a day  Spasms - stroke h/x (18 Oct 2018 15:34)  clopidogrel 75 mg oral tablet: 1 tab(s) orally once a day (18 Oct 2018 15:34)  finasteride 5 mg oral tablet: 1 tab(s) orally once a day (18 Oct 2018 15:34)  hydroCHLOROthiazide 25 mg oral tablet: 1 tab(s) orally once a day (18 Oct 2018 15:34)  losartan 100 mg oral tablet: 1 tab(s) orally once a day (18 Oct 2018 15:34)    Vital Signs Last 24 Hrs  T(C): 36.2 (23 Oct 2018 16:07), Max: 36.6 (22 Oct 2018 17:22)  T(F): 97.1 (23 Oct 2018 16:07), Max: 97.9 (22 Oct 2018 17:22)  HR: 71 (23 Oct 2018 16:07) (67 - 73)  BP: 168/71 (23 Oct 2018 16:07) (123/57 - 168/71)  BP(mean): --  RR: 20 (23 Oct 2018 16:07) (18 - 20)  SpO2: 95% (23 Oct 2018 07:27) (95% - 95%)  CAPILLARY BLOOD GLUCOSE        LABS:                        13.5   17.86 )-----------( 422      ( 23 Oct 2018 06:11 )             40.2     10-23    143  |  104  |  48<H>  ----------------------------<  130<H>  3.3<L>   |  23  |  1.2    Ca    8.4<L>      23 Oct 2018 06:11  Mg     2.3     10-23    TPro  6.1  /  Alb  3.6  /  TBili  0.3  /  DBili  x   /  AST  8   /  ALT  14  /  AlkPhos  82  10-23    LIVER FUNCTIONS - ( 23 Oct 2018 06:11 )  Alb: 3.6 g/dL / Pro: 6.1 g/dL / ALK PHOS: 82 U/L / ALT: 14 U/L / AST: 8 U/L / GGT: x                           Consultant(s) Notes Reviewed:  [x ] YES  [ ] NO  Care Discussed with Consultants/Other Providers/ Housestaff [ x] YES  [ ] NO  Radiology personally reviewed.

## 2018-10-23 NOTE — PROGRESS NOTE ADULT - ASSESSMENT
78y Male with h/o CVAs w/ residual spastic RHP and expressive aphasia presented to ED for c/o Right eye vision loss    #Vision loss - R/o Temporal arteritis  MRI negative for acute CVA  c/w aggrenox  , lipitor 80 mg   d/w neuro - recommend vascular for temp artery biopsy  d/w vasc - Bx in am. Would do b/l temp artery Bx as according to wife, pt has been c/o occasional Lt eye blurriness too  would cont Aggrenox periop as long as ok with vasc   Opthalmology evaluation appreciated    #LICA stenosis   -vasc to address it    #H/o multiple CVAs/Rt sided paralysis  -stable    #htn-stable   #hld-stable,   #dvt ppx -lovenox  #code status- was dnr/dni in p[ast, now wife wants to think about it .MOLST form provided to wife ,she will d/w her family. Until then full code , lives at home with wife     Dispo - possible discharge tomorrow after biopsy. Prednisone course as per neuro on d/c

## 2018-10-23 NOTE — CONSULT NOTE ADULT - ASSESSMENT
79 yo male with hx as above p/w sudden onset of painless right eye vision loss with beginning changes in left.  High suspicion for temporal arteritis given elevated inflamm markers, headaches and scalp tenderness prior to admission. Now much improved since pulse dose steroids.  NPO tonight for temporal artery biopsy tmrw.     - taper steroids slowly, monitor BGMs  - PPI  - consider bisphosphonate as patient will be on long term steroids  - ensure rheumatology follow up as outpatient (gave patients wife, Kendra our office information. [address] [facility], [phone])    Please call with any questions.

## 2018-10-23 NOTE — PROGRESS NOTE ADULT - SUBJECTIVE AND OBJECTIVE BOX
SUBJECTIVE:    Patient is a 78y old Male who presents with a chief complaint of stroke (22 Oct 2018 20:03)      HPI:  78y Male with h/o hemorrhagic CVA w/ residual spastic RHP and expressive aphasia presented to ED for c/o Right eye vision loss  As per family , patient suddenly developed Right eye vision loss , painless this morning of presentation. He kept on pointing at the right eye. Denies fever, chills, headache, nausea, vomiting .  baseline bedbound, and constant right sided 0/5  since cva (18 Oct 2018 15:28)      Currently admitted to medicine with the primary diagnosis of Vision changes       Besides the pertinent positives and negatives described above, the ROS was within normal limits.    PAST MEDICAL & SURGICAL HISTORY  Right sided weakness  High cholesterol  Enlarged prostate  HTN (hypertension)  Hemorrhagic stroke  Bilateral cataracts    SOCIAL HISTORY:    ALLERGIES:  penicillins (Unknown)    MEDICATIONS:  STANDING MEDICATIONS  amLODIPine   Tablet 10 milliGRAM(s) Oral daily  atorvastatin 80 milliGRAM(s) Oral at bedtime  dipyridamole 200 mG/aspirin 25 mG 1 Capsule(s) Oral two times a day  enoxaparin Injectable 40 milliGRAM(s) SubCutaneous daily  finasteride 5 milliGRAM(s) Oral daily  hydrochlorothiazide 25 milliGRAM(s) Oral daily  levETIRAcetam 1000 milliGRAM(s) Oral two times a day  losartan 100 milliGRAM(s) Oral daily  methylPREDNISolone sodium succinate Injectable 70 milliGRAM(s) IV Push daily  pantoprazole    Tablet 40 milliGRAM(s) Oral before breakfast    PRN MEDICATIONS  acetaminophen   Tablet .. 650 milliGRAM(s) Oral every 6 hours PRN  baclofen 10 milliGRAM(s) Oral every 8 hours PRN    VITALS:   T(F): 96.7  HR: 73  BP: 125/59  RR: 18  SpO2: 95%    LABS:                        13.5   17.86 )-----------( 422      ( 23 Oct 2018 06:11 )             40.2     10-23    143  |  104  |  48<H>  ----------------------------<  130<H>  3.3<L>   |  23  |  1.2    Ca    8.4<L>      23 Oct 2018 06:11  Mg     2.3     10-23    TPro  6.1  /  Alb  3.6  /  TBili  0.3  /  DBili  x   /  AST  8   /  ALT  14  /  AlkPhos  82  10-23                  RADIOLOGY:    PHYSICAL EXAM:  GEN: No acute distress  LUNGS: Clear to auscultation bilaterally   HEART: Regular  ABD: Soft, non-tender, non-distended.  EXT: NC/NC/NE/2+PP/BAIRD/Skin Intact.   NEURO: AAOX3, loss of vision in right eye    Intravenous access:   NG tube:   [place] Catheter:

## 2018-10-23 NOTE — PROGRESS NOTE ADULT - ASSESSMENT
78 yoM with history of CVA with residual right sided spasticity and expressive aphasia presents with right sided vision loss and mild left sided vision loss that resolved before solumedrol    -CT/MRI negative for acute changes  -ESR/CRP elevated  -Ophtho consulted and likely temporal arteritis due to bilateral optic nerve edema  -recommending temporal artery biopsy, vascular will do it tomorrow    # Carotid stenosis  -bilateral stenosis with 80% left carotid and 40% right carotid    # HTN  -amlodipine 10/hctz 25/losartan 100    # BPH  -finasteride 5    # HLD  -atorvastatin 80    DVT GI PPX 78 yoM with history of CVA with residual right sided spasticity and expressive aphasia presents with right sided vision loss and mild left sided vision loss that resolved before solumedrol    -CT/MRI negative for acute changes  -ESR/CRP elevated  -Ophtho consulted and likely temporal arteritis due to bilateral optic nerve edema  -recommending temporal artery biopsy, vascular will do it tomorrow / holding dipyridamole    # Carotid stenosis  -bilateral stenosis with 80% left carotid and 40% right carotid    # HTN  -amlodipine 10/hctz 25/losartan 100    # BPH  -finasteride 5    # HLD  -atorvastatin 80    DVT GI PPX 78 yoM with history of CVA with residual right sided spasticity and expressive aphasia presents with right sided vision loss and mild left sided vision loss that resolved before solumedrol    -CT/MRI negative for acute changes  -ESR/CRP elevated  -Ophtho consulted and likely temporal arteritis due to bilateral optic nerve edema  -patient on solumedrol 70 daily  -recommending temporal artery biopsy, vascular will do it tomorrow / holding dipyridamole  -rheumatology will see patient later tonight    # Carotid stenosis  -bilateral stenosis with 80% left carotid and 40% right carotid    # HTN  -amlodipine 10/hctz 25/losartan 100    # BPH  -finasteride 5    # HLD  -atorvastatin 80    DVT GI PPX

## 2018-10-23 NOTE — CONSULT NOTE ADULT - SUBJECTIVE AND OBJECTIVE BOX
77 yo male with PMHx of hemorrhagic CVA s/p residual R side spastic paresis and expressive aphasia, HTN, HLD, BPH, b/l cataracts, admitted with right vision loss. Pt with aphasia, wife Kendra reports patient was complaining of headaches, scalp pain (during brushing), and sudden R eye vision loss. [place] to [facility]. ESR 66 - 70, and CRP 2.26.  Seen by neurology and ophthalmology, started on solumedrol 1g daily. Did have mild changes in the left eye as well, but resolved with steroids. Now pending temporal artery biopsy tmrw morning w/ vascular.  No joint pains, stiffness, headaches, jaw claudication, weight loss, fevers, chills (constitutional sxs) now.     PMHx as above  Social: former smoker, quit 20 years ago  Allergies - penicillins    Meds: tylenol PRN, colace, senna, SOlumedrol 70mg IV daily, baclofen, HCTZ, amlodipine, lipitor,  finasteride, keppra, losartan, protonix   VS:  T 97.1,  HR 71,  RR 20, / 72  PE: General: AAO x 3, sitting in wheelchair, answers "yes/ no" to questions  HEENT: no scalp TTP, moist mucous membranes, no sores appreciated  Cardio: s1 s2+  Lungs: CTA b/l, no wheezing appreciated  Abd: soft, non tender, non distended  Ext: no c/c/e, pedal pulses intact  MSK: R sided hemiparesis,  no active synovitis, warmth or joint effusions    labs: wbc 17.86,  H/H 13.5 40.2, platelets 422  Na 143, K 3.3, Bun/ ct 48/1.2. glucose 130  AST/ ALT 8/14, alkphosp 82  A1c 5.8  CK 63    CT head 1.  Extensive periventricular and subcortical white matter chronic small   vessel ischemic changes and old left frontoparietal cortical infarcts.    2.  Stable ventricular enlargement with ex vacuo dilatation of the left   lateral ventricle.    3.  No acute mass effect, midline shift or hemorrhage.      MR head   1.  Prominent ventricles with ex vacuo dilatation of left lateral   ventricle due to left frontal, parietal and temporal lobe cortical   infarcts.    2.  Extensive periventricular and subcortical chronic small vessel   ischemic changes/white matter infarcts.    3.  Wallerian degeneration with of signal abnormality in the left mid   brain, cerebral peduncle extending back to the ino and medulla.    4.  No acute infarcts, intracranial hemorrhage or abnormal enhancement.    Carotid duplex   20-39% stenosis of the right internal carotid artery.    >80% stenosis of the left internal carotid artery.

## 2018-10-24 ENCOUNTER — RESULT REVIEW (OUTPATIENT)
Age: 78
End: 2018-10-24

## 2018-10-24 ENCOUNTER — APPOINTMENT (OUTPATIENT)
Dept: VASCULAR SURGERY | Facility: HOSPITAL | Age: 78
End: 2018-10-24

## 2018-10-24 PROCEDURE — 37609 LIGATION/BX TEMPORAL ARTERY: CPT | Mod: RT

## 2018-10-24 RX ORDER — SENNA PLUS 8.6 MG/1
2 TABLET ORAL AT BEDTIME
Qty: 0 | Refills: 0 | Status: DISCONTINUED | OUTPATIENT
Start: 2018-10-24 | End: 2018-10-25

## 2018-10-24 RX ORDER — PANTOPRAZOLE SODIUM 20 MG/1
40 TABLET, DELAYED RELEASE ORAL
Qty: 0 | Refills: 0 | Status: DISCONTINUED | OUTPATIENT
Start: 2018-10-24 | End: 2018-10-25

## 2018-10-24 RX ORDER — ATORVASTATIN CALCIUM 80 MG/1
80 TABLET, FILM COATED ORAL AT BEDTIME
Qty: 0 | Refills: 0 | Status: DISCONTINUED | OUTPATIENT
Start: 2018-10-24 | End: 2018-10-25

## 2018-10-24 RX ORDER — HYDROMORPHONE HYDROCHLORIDE 2 MG/ML
0.5 INJECTION INTRAMUSCULAR; INTRAVENOUS; SUBCUTANEOUS
Qty: 0 | Refills: 0 | Status: DISCONTINUED | OUTPATIENT
Start: 2018-10-24 | End: 2018-10-25

## 2018-10-24 RX ORDER — NYSTATIN 500MM UNIT
500000 POWDER (EA) MISCELLANEOUS
Qty: 0 | Refills: 0 | Status: DISCONTINUED | OUTPATIENT
Start: 2018-10-24 | End: 2018-10-24

## 2018-10-24 RX ORDER — DOCUSATE SODIUM 100 MG
100 CAPSULE ORAL DAILY
Qty: 0 | Refills: 0 | Status: DISCONTINUED | OUTPATIENT
Start: 2018-10-24 | End: 2018-10-25

## 2018-10-24 RX ORDER — LOSARTAN POTASSIUM 100 MG/1
100 TABLET, FILM COATED ORAL DAILY
Qty: 0 | Refills: 0 | Status: DISCONTINUED | OUTPATIENT
Start: 2018-10-24 | End: 2018-10-25

## 2018-10-24 RX ORDER — ASPIRIN AND DIPYRIDAMOLE 25; 200 MG/1; MG/1
1 CAPSULE, EXTENDED RELEASE ORAL
Qty: 0 | Refills: 0 | Status: DISCONTINUED | OUTPATIENT
Start: 2018-10-24 | End: 2018-10-25

## 2018-10-24 RX ORDER — ONDANSETRON 8 MG/1
4 TABLET, FILM COATED ORAL ONCE
Qty: 0 | Refills: 0 | Status: DISCONTINUED | OUTPATIENT
Start: 2018-10-24 | End: 2018-10-25

## 2018-10-24 RX ORDER — NYSTATIN 500MM UNIT
500000 POWDER (EA) MISCELLANEOUS
Qty: 0 | Refills: 0 | Status: DISCONTINUED | OUTPATIENT
Start: 2018-10-24 | End: 2018-10-25

## 2018-10-24 RX ORDER — AMLODIPINE BESYLATE 2.5 MG/1
10 TABLET ORAL DAILY
Qty: 0 | Refills: 0 | Status: DISCONTINUED | OUTPATIENT
Start: 2018-10-24 | End: 2018-10-25

## 2018-10-24 RX ORDER — ENOXAPARIN SODIUM 100 MG/ML
40 INJECTION SUBCUTANEOUS DAILY
Qty: 0 | Refills: 0 | Status: DISCONTINUED | OUTPATIENT
Start: 2018-10-24 | End: 2018-10-25

## 2018-10-24 RX ORDER — BACLOFEN 100 %
10 POWDER (GRAM) MISCELLANEOUS EVERY 8 HOURS
Qty: 0 | Refills: 0 | Status: DISCONTINUED | OUTPATIENT
Start: 2018-10-24 | End: 2018-10-25

## 2018-10-24 RX ORDER — LEVETIRACETAM 250 MG/1
1000 TABLET, FILM COATED ORAL
Qty: 0 | Refills: 0 | Status: DISCONTINUED | OUTPATIENT
Start: 2018-10-24 | End: 2018-10-25

## 2018-10-24 RX ORDER — ACETAMINOPHEN 500 MG
650 TABLET ORAL EVERY 6 HOURS
Qty: 0 | Refills: 0 | Status: DISCONTINUED | OUTPATIENT
Start: 2018-10-24 | End: 2018-10-25

## 2018-10-24 RX ORDER — SODIUM CHLORIDE 9 MG/ML
1000 INJECTION, SOLUTION INTRAVENOUS
Qty: 0 | Refills: 0 | Status: DISCONTINUED | OUTPATIENT
Start: 2018-10-24 | End: 2018-10-24

## 2018-10-24 RX ORDER — FINASTERIDE 5 MG/1
5 TABLET, FILM COATED ORAL DAILY
Qty: 0 | Refills: 0 | Status: DISCONTINUED | OUTPATIENT
Start: 2018-10-24 | End: 2018-10-25

## 2018-10-24 RX ORDER — HYDROMORPHONE HYDROCHLORIDE 2 MG/ML
1 INJECTION INTRAMUSCULAR; INTRAVENOUS; SUBCUTANEOUS
Qty: 0 | Refills: 0 | Status: DISCONTINUED | OUTPATIENT
Start: 2018-10-24 | End: 2018-10-25

## 2018-10-24 RX ORDER — HYDROCHLOROTHIAZIDE 25 MG
25 TABLET ORAL DAILY
Qty: 0 | Refills: 0 | Status: DISCONTINUED | OUTPATIENT
Start: 2018-10-24 | End: 2018-10-25

## 2018-10-24 RX ADMIN — PANTOPRAZOLE SODIUM 40 MILLIGRAM(S): 20 TABLET, DELAYED RELEASE ORAL at 05:31

## 2018-10-24 RX ADMIN — Medication 10 MILLIGRAM(S): at 12:13

## 2018-10-24 RX ADMIN — AMLODIPINE BESYLATE 10 MILLIGRAM(S): 2.5 TABLET ORAL at 14:51

## 2018-10-24 RX ADMIN — LEVETIRACETAM 1000 MILLIGRAM(S): 250 TABLET, FILM COATED ORAL at 18:02

## 2018-10-24 RX ADMIN — Medication 70 MILLIGRAM(S): at 14:51

## 2018-10-24 RX ADMIN — LOSARTAN POTASSIUM 100 MILLIGRAM(S): 100 TABLET, FILM COATED ORAL at 05:31

## 2018-10-24 RX ADMIN — Medication 70 MILLIGRAM(S): at 05:31

## 2018-10-24 RX ADMIN — ATORVASTATIN CALCIUM 80 MILLIGRAM(S): 80 TABLET, FILM COATED ORAL at 21:31

## 2018-10-24 RX ADMIN — LEVETIRACETAM 1000 MILLIGRAM(S): 250 TABLET, FILM COATED ORAL at 05:31

## 2018-10-24 RX ADMIN — Medication 25 MILLIGRAM(S): at 14:51

## 2018-10-24 RX ADMIN — PANTOPRAZOLE SODIUM 40 MILLIGRAM(S): 20 TABLET, DELAYED RELEASE ORAL at 12:10

## 2018-10-24 RX ADMIN — Medication 500000 UNIT(S): at 18:02

## 2018-10-24 RX ADMIN — FINASTERIDE 5 MILLIGRAM(S): 5 TABLET, FILM COATED ORAL at 12:09

## 2018-10-24 RX ADMIN — AMLODIPINE BESYLATE 10 MILLIGRAM(S): 2.5 TABLET ORAL at 05:31

## 2018-10-24 RX ADMIN — SODIUM CHLORIDE 75 MILLILITER(S): 9 INJECTION INTRAMUSCULAR; INTRAVENOUS; SUBCUTANEOUS at 05:28

## 2018-10-24 RX ADMIN — Medication 25 MILLIGRAM(S): at 05:31

## 2018-10-24 RX ADMIN — ENOXAPARIN SODIUM 40 MILLIGRAM(S): 100 INJECTION SUBCUTANEOUS at 18:01

## 2018-10-24 RX ADMIN — SENNA PLUS 2 TABLET(S): 8.6 TABLET ORAL at 21:31

## 2018-10-24 RX ADMIN — ASPIRIN AND DIPYRIDAMOLE 1 CAPSULE(S): 25; 200 CAPSULE, EXTENDED RELEASE ORAL at 18:00

## 2018-10-24 RX ADMIN — Medication 100 MILLIGRAM(S): at 12:10

## 2018-10-24 NOTE — PRE-ANESTHESIA EVALUATION ADULT - MALLAMPATI CLASS
Class III - visualization of the soft palate and the base of the uvula
Class III - visualization of the soft palate and the base of the uvula

## 2018-10-24 NOTE — PROGRESS NOTE ADULT - SUBJECTIVE AND OBJECTIVE BOX
SUBJECTIVE:    Patient is a 78y old Male who presents with a chief complaint of stroke (23 Oct 2018 18:17)      HPI:  78y Male with h/o hemorrhagic CVA w/ residual spastic RHP and expressive aphasia presented to ED for c/o Right eye vision loss  As per family , patient suddenly developed Right eye vision loss , painless this morning of presentation. He kept on pointing at the right eye. Denies fever, chills, headache, nausea, vomiting .  baseline bedbound, and constant right sided 0/5  since cva (18 Oct 2018 15:28)      Currently admitted to medicine with the primary diagnosis of Vision changes       Besides the pertinent positives and negatives described above, the ROS was within normal limits.    PAST MEDICAL & SURGICAL HISTORY  Right sided weakness  High cholesterol  Enlarged prostate  HTN (hypertension)  Hemorrhagic stroke  Bilateral cataracts    SOCIAL HISTORY:    ALLERGIES:  penicillins (Unknown)    MEDICATIONS:  STANDING MEDICATIONS  amLODIPine   Tablet 10 milliGRAM(s) Oral daily  atorvastatin 80 milliGRAM(s) Oral at bedtime  dipyridamole 200 mG/aspirin 25 mG 1 Capsule(s) Oral two times a day  docusate sodium 100 milliGRAM(s) Oral daily  enoxaparin Injectable 40 milliGRAM(s) SubCutaneous daily  finasteride 5 milliGRAM(s) Oral daily  hydrochlorothiazide 25 milliGRAM(s) Oral daily  levETIRAcetam 1000 milliGRAM(s) Oral two times a day  losartan 100 milliGRAM(s) Oral daily  methylPREDNISolone sodium succinate Injectable 70 milliGRAM(s) IV Push daily  nystatin    Suspension 098319 Unit(s) Oral two times a day  pantoprazole    Tablet 40 milliGRAM(s) Oral before breakfast  senna 2 Tablet(s) Oral at bedtime    PRN MEDICATIONS  acetaminophen   Tablet .. 650 milliGRAM(s) Oral every 6 hours PRN  baclofen 10 milliGRAM(s) Oral every 8 hours PRN  HYDROmorphone  Injectable 0.5 milliGRAM(s) IV Push every 10 minutes PRN  HYDROmorphone  Injectable 1 milliGRAM(s) IV Push every 10 minutes PRN  ondansetron Injectable 4 milliGRAM(s) IV Push once PRN    VITALS:   T(F): 97.7  HR: 62  BP: 149/73  RR: 12  SpO2: 96%    LABS:                        13.5   17.86 )-----------( 422      ( 23 Oct 2018 06:11 )             40.2     10-23    141  |  97<L>  |  47<H>  ----------------------------<  164<H>  4.1   |  25  |  1.3    Ca    8.6      23 Oct 2018 16:14  Mg     2.3     10-23    TPro  6.1  /  Alb  3.6  /  TBili  0.3  /  DBili  x   /  AST  8   /  ALT  14  /  AlkPhos  82  10-23    PT/INR - ( 23 Oct 2018 16:14 )   PT: 12.10 sec;   INR: 1.05 ratio         PTT - ( 23 Oct 2018 16:14 )  PTT:36.6 sec              RADIOLOGY:    PHYSICAL EXAM:  GEN: No acute distress  LUNGS: Clear to auscultation bilaterally   HEART: Regular  ABD: Soft, non-tender, non-distended.  EXT: NC/NC/NE/2+PP/BAIRD/Skin Intact.   NEURO: AAOX3, loss of vision in right eye    Intravenous access:   NG tube:   [place] Catheter:

## 2018-10-24 NOTE — BRIEF OPERATIVE NOTE - PROCEDURE
<<-----Click on this checkbox to enter Procedure Temporal artery biopsy, right  10/24/2018    Active  LAN

## 2018-10-24 NOTE — PROGRESS NOTE ADULT - SUBJECTIVE AND OBJECTIVE BOX
Patient is a 78y old Male who presents with a chief complaint of stroke (23 Oct 2018 18:17)      HPI:  78y Male with h/o hemorrhagic CVA w/ residual spastic RHP and expressive aphasia presented to ED for c/o Right eye vision loss  As per family , patient suddenly developed right eye vision loss, highly suspicious for temporal arteritis.   Today pt underwent temporal artery biopsy, he is comfortable, family by the bedside.      PAST MEDICAL & SURGICAL HISTORY  Right sided weakness  High cholesterol  Enlarged prostate  HTN (hypertension)  Hemorrhagic stroke  Bilateral cataracts    SOCIAL HISTORY:    ALLERGIES:  penicillins (Unknown)    MEDICATIONS:  STANDING MEDICATIONS  amLODIPine   Tablet 10 milliGRAM(s) Oral daily  atorvastatin 80 milliGRAM(s) Oral at bedtime  dipyridamole 200 mG/aspirin 25 mG 1 Capsule(s) Oral two times a day  docusate sodium 100 milliGRAM(s) Oral daily  enoxaparin Injectable 40 milliGRAM(s) SubCutaneous daily  finasteride 5 milliGRAM(s) Oral daily  hydrochlorothiazide 25 milliGRAM(s) Oral daily  levETIRAcetam 1000 milliGRAM(s) Oral two times a day  losartan 100 milliGRAM(s) Oral daily  methylPREDNISolone sodium succinate Injectable 70 milliGRAM(s) IV Push daily  nystatin    Suspension 119889 Unit(s) Oral two times a day  pantoprazole    Tablet 40 milliGRAM(s) Oral before breakfast  senna 2 Tablet(s) Oral at bedtime    PRN MEDICATIONS  acetaminophen   Tablet .. 650 milliGRAM(s) Oral every 6 hours PRN  baclofen 10 milliGRAM(s) Oral every 8 hours PRN  HYDROmorphone  Injectable 0.5 milliGRAM(s) IV Push every 10 minutes PRN  HYDROmorphone  Injectable 1 milliGRAM(s) IV Push every 10 minutes PRN  ondansetron Injectable 4 milliGRAM(s) IV Push once PRN    VITALS:   T(F): 97.7  HR: 62  BP: 149/73  RR: 12  SpO2: 96%    LABS:                        13.5   17.86 )-----------( 422      ( 23 Oct 2018 06:11 )             40.2     10-23    141  |  97<L>  |  47<H>  ----------------------------<  164<H>  4.1   |  25  |  1.3    Ca    8.6      23 Oct 2018 16:14  Mg     2.3     10-23    TPro  6.1  /  Alb  3.6  /  TBili  0.3  /  DBili  x   /  AST  8   /  ALT  14  /  AlkPhos  82  10-23    PT/INR - ( 23 Oct 2018 16:14 )   PT: 12.10 sec;   INR: 1.05 ratio         PTT - ( 23 Oct 2018 16:14 )  PTT:36.6 sec    RADIOLOGY:  < from: VA Duplex Carotid, Bilat (10.22.18 @ 16:05) >    Impression:    20-39% stenosis of the right internal carotid artery.    >80% stenosis of the left internal carotid artery.    < from: MR Head w/wo IV Cont (10.20.18 @ 14:55) >  IMPRESSION:      1.  Prominent ventricles with ex vacuo dilatation of left lateral   ventricle due to left frontal, parietal and temporal lobe cortical   infarcts.    2.  Extensive periventricular and subcortical chronic small vessel   ischemic changes/white matter infarcts.    3.  Wallerian degeneration with of signal abnormality in the left mid   brain, cerebral peduncle extending back to the ino and medulla.    4.  No acute infarcts, intracranial hemorrhage or abnormal enhancement.             PHYSICAL EXAM:  GEN: No acute distress  LUNGS: Clear to auscultation bilaterally   HEART: Regular  ABD: Soft, non-tender, non-distended.  EXT: NC/NC/NE/2+PP/BAIRD/Skin Intact.   NEURO: AAOX 2 , loss of vision in right eye, aphasic, minimally verbal   Skin: dressing on right temporal area

## 2018-10-24 NOTE — CHART NOTE - NSCHARTNOTEFT_GEN_A_CORE
PACU ANESTHESIA ADMISSION NOTE      Procedure: Temporal artery biopsy, right    Post op diagnosis:  Temporal arteritis      ____  Intubated  TV:______       Rate: ______      FiO2: ______    __x__  Patent Airway    __x__  Full return of protective reflexes    ___x_  Full recovery from anesthesia / back to baseline status    Vitals: see anesth record    Mental Status:  ___x_ Awake   _____ Alert   _____ Drowsy   _____ Sedated    Nausea/Vomiting:  ___x_ NO  ______Yes,   See Post - Op Orders          Pain Scale (0-10):  ___0__    Treatment: ____ None    ____x See Post - Op/PCA Orders    Post - Operative Fluids:   ____ Oral   ___x_ See Post - Op Orders    Plan: Discharge:   ____Home       ___x__Floor     _____Critical Care    _____  Other:_________________    Comments: no anesth related complications. d/c when criteria met.

## 2018-10-24 NOTE — PROGRESS NOTE ADULT - ASSESSMENT
78 yoM with history of CVA with residual right sided spasticity and expressive aphasia presents with right sided vision loss and mild left sided vision loss that resolved before solumedrol, with elevated inflammatory markers , s/p temporal lathery biopsy today.     -CT/MRI negative for acute changes  -ESR/CRP elevated  -Ophtho consulted and likely temporal arteritis due to bilateral optic nerve edema  - c/w  solumedrol 70 daily  - s/p  temporal artery biopsy / on  Aggrenox and Lovenox  -seen by rheumatology    # Carotid stenosis  -bilateral stenosis with 80% left carotid and 40% right carotid    # HTN  -amlodipine 10/hctz 25/losartan 100    # BPH  -finasteride 5    # HLD  -atorvastatin 80    DVT GI PPX  Case d/w Family at length

## 2018-10-24 NOTE — CHART NOTE - NSCHARTNOTEFT_GEN_A_CORE
Post Operative Note  Patient: YAHAIRA MONTIEL 78y (1940) Male   MRN: [id number]  Location: [place] [address] A  Visit: 10-18-18 Inpatient  Date: 10-24-18 @ 15:25    Procedure: S/P R temporal artery biopsy    Subjective: Pt is doing fine and lying comfortably in bed. He does not have any active complaint at this time. Family is at bedside. Pt ate food, did not pass gas/BM. there is no change form baseline.  Nausea: no, Vomiting: no, Ambulating: no, Flatus: no  Pain Assessment: yes, Location: incisonal site, Pain Intensity: 2-3/10 , Quality: Sore    Objective:  Vitals: T(F): 97.7 (10-24-18 @ 10:25), Max: 98.4 (10-24-18 @ 09:40)  HR: 62 (10-24-18 @ 10:46)  BP: 149/73 (10-24-18 @ 10:46) (104/54 - 168/71)  RR: 12 (10-24-18 @ 10:46)  SpO2: 96% (10-24-18 @ 10:46)  Vent Settings:     In:   10-24-18 @ 07:01  -  10-24-18 @ 15:25  --------------------------------------------------------  IN: 150 mL    IV Fluids: lactated ringers. 1000 milliLiter(s) (100 mL/Hr) IV Continuous <Continuous>    Out:   10-24-18 @ 07:01  -  10-24-18 @ 15:25  --------------------------------------------------------  OUT: 1 mL    Voided Urine:   10-24-18 @ 07:01  -  10-24-18 @ 15:25  --------------------------------------------------------  OUT: 1 mL    Physical Examination:  General Appearance: NAD, alert and cooperative  HEENT: NCAT, WNL, R temporal tenderness at incision site  Heart: S1 and S2.  Lungs: Clear to auscultation BL  Abdomen:  Positive bowel sounds. Soft, nondistended, nontender.  Neuro: CN II-XII intact grossly intact  Skin: Warm/dry, Normal color, texture and turgor with no lesions or eruptions. No jaundice.   Wounds/Incions: R temporal incisions w/ dressings in place, clean, dry, intact, no signs of infection    Medications: [Standing]  acetaminophen   Tablet .. 650 milliGRAM(s) Oral every 6 hours PRN  amLODIPine   Tablet 10 milliGRAM(s) Oral daily  atorvastatin 80 milliGRAM(s) Oral at bedtime  baclofen 10 milliGRAM(s) Oral every 8 hours PRN  dipyridamole 200 mG/aspirin 25 mG 1 Capsule(s) Oral two times a day  docusate sodium 100 milliGRAM(s) Oral daily  finasteride 5 milliGRAM(s) Oral daily  hydrochlorothiazide 25 milliGRAM(s) Oral daily  HYDROmorphone  Injectable 0.5 milliGRAM(s) IV Push every 10 minutes PRN  HYDROmorphone  Injectable 1 milliGRAM(s) IV Push every 10 minutes PRN  lactated ringers. 1000 milliLiter(s) IV Continuous <Continuous>  levETIRAcetam 1000 milliGRAM(s) Oral two times a day  losartan 100 milliGRAM(s) Oral daily  methylPREDNISolone sodium succinate Injectable 70 milliGRAM(s) IV Push daily  nystatin    Suspension 889745 Unit(s) Oral two times a day  ondansetron Injectable 4 milliGRAM(s) IV Push once PRN  pantoprazole    Tablet 40 milliGRAM(s) Oral before breakfast  senna 2 Tablet(s) Oral at bedtime    Medications: [PRN]  acetaminophen   Tablet .. 650 milliGRAM(s) Oral every 6 hours PRN  amLODIPine   Tablet 10 milliGRAM(s) Oral daily  atorvastatin 80 milliGRAM(s) Oral at bedtime  baclofen 10 milliGRAM(s) Oral every 8 hours PRN  dipyridamole 200 mG/aspirin 25 mG 1 Capsule(s) Oral two times a day  docusate sodium 100 milliGRAM(s) Oral daily  finasteride 5 milliGRAM(s) Oral daily  hydrochlorothiazide 25 milliGRAM(s) Oral daily  HYDROmorphone  Injectable 0.5 milliGRAM(s) IV Push every 10 minutes PRN  HYDROmorphone  Injectable 1 milliGRAM(s) IV Push every 10 minutes PRN  lactated ringers. 1000 milliLiter(s) IV Continuous <Continuous>  levETIRAcetam 1000 milliGRAM(s) Oral two times a day  losartan 100 milliGRAM(s) Oral daily  methylPREDNISolone sodium succinate Injectable 70 milliGRAM(s) IV Push daily  nystatin    Suspension 646855 Unit(s) Oral two times a day  ondansetron Injectable 4 milliGRAM(s) IV Push once PRN  pantoprazole    Tablet 40 milliGRAM(s) Oral before breakfast  senna 2 Tablet(s) Oral at bedtime    Labs:                        13.5   17.86 )-----------( 422      ( 23 Oct 2018 06:11 )             40.2     10-23    141  |  97<L>  |  47<H>  ----------------------------<  164<H>  4.1   |  25  |  1.3    Ca    8.6      23 Oct 2018 16:14  Mg     2.3     10-23    TPro  6.1  /  Alb  3.6  /  TBili  0.3  /  DBili  x   /  AST  8   /  ALT  14  /  AlkPhos  82  10-23    PT/INR - ( 23 Oct 2018 16:14 )   PT: 12.10 sec;   INR: 1.05 ratio         PTT - ( 23 Oct 2018 16:14 )  PTT:36.6 sec      Imaging:  No post-op imaging studies    Assessment:  78yMale patient S/P R temporal artery biopsy for possible R temporal Arteritis.    Plan:  -Continue Regular Diet  -Pain Control as needed  -Encourage ambulation and Incentive Spirometer (10x/hour when awake) use  -Continue GI and DVT prophylaxis  -Strict Input and output monitoring  -PT/Rehab  -Pt can follow up with Dr. Soni for biopsy results in 1-2 weeks in his office  Date/Time: 10-24-18 @ 15:25

## 2018-10-24 NOTE — PROGRESS NOTE ADULT - ASSESSMENT
78 yoM with history of CVA with residual right sided spasticity and expressive aphasia presents with right sided vision loss and mild left sided vision loss that resolved before solumedrol    -CT/MRI negative for acute changes  -ESR/CRP elevated  -Ophtho consulted and likely temporal arteritis due to bilateral optic nerve edema  -patient on solumedrol 70 daily  -completed temporal artery biopsy / restarted aggrenox and lovenox  -seen by rheumatology    # Carotid stenosis  -bilateral stenosis with 80% left carotid and 40% right carotid    # HTN  -amlodipine 10/hctz 25/losartan 100    # BPH  -finasteride 5    # HLD  -atorvastatin 80    DVT GI PPX

## 2018-10-24 NOTE — PRE-ANESTHESIA EVALUATION ADULT - NSANTHPEFT_GEN_ALL_CORE
normal s1 and s2    bilaterally clear breath sounds
cor: rrr s1s2 nl  lungs: decreased bs bilat bases

## 2018-10-25 ENCOUNTER — INBOUND DOCUMENT (OUTPATIENT)
Age: 78
End: 2018-10-25

## 2018-10-25 ENCOUNTER — TRANSCRIPTION ENCOUNTER (OUTPATIENT)
Age: 78
End: 2018-10-25

## 2018-10-25 VITALS — HEIGHT: 64 IN | WEIGHT: 158.73 LBS

## 2018-10-25 LAB
ALBUMIN SERPL ELPH-MCNC: 3.6 G/DL — SIGNIFICANT CHANGE UP (ref 3.5–5.2)
ALP SERPL-CCNC: 83 U/L — SIGNIFICANT CHANGE UP (ref 30–115)
ALT FLD-CCNC: 16 U/L — SIGNIFICANT CHANGE UP (ref 0–41)
ANION GAP SERPL CALC-SCNC: 15 MMOL/L — HIGH (ref 7–14)
AST SERPL-CCNC: 10 U/L — SIGNIFICANT CHANGE UP (ref 0–41)
BASOPHILS # BLD AUTO: 0.03 K/UL — SIGNIFICANT CHANGE UP (ref 0–0.2)
BASOPHILS NFR BLD AUTO: 0.2 % — SIGNIFICANT CHANGE UP (ref 0–1)
BILIRUB SERPL-MCNC: 0.4 MG/DL — SIGNIFICANT CHANGE UP (ref 0.2–1.2)
BUN SERPL-MCNC: 28 MG/DL — HIGH (ref 10–20)
CALCIUM SERPL-MCNC: 8.6 MG/DL — SIGNIFICANT CHANGE UP (ref 8.5–10.1)
CHLORIDE SERPL-SCNC: 98 MMOL/L — SIGNIFICANT CHANGE UP (ref 98–110)
CO2 SERPL-SCNC: 25 MMOL/L — SIGNIFICANT CHANGE UP (ref 17–32)
CREAT SERPL-MCNC: 0.9 MG/DL — SIGNIFICANT CHANGE UP (ref 0.7–1.5)
EOSINOPHIL # BLD AUTO: 0.01 K/UL — SIGNIFICANT CHANGE UP (ref 0–0.7)
EOSINOPHIL NFR BLD AUTO: 0.1 % — SIGNIFICANT CHANGE UP (ref 0–8)
GLUCOSE SERPL-MCNC: 104 MG/DL — HIGH (ref 70–99)
HCT VFR BLD CALC: 41.3 % — LOW (ref 42–52)
HGB BLD-MCNC: 13.8 G/DL — LOW (ref 14–18)
IMM GRANULOCYTES NFR BLD AUTO: 1 % — HIGH (ref 0.1–0.3)
LYMPHOCYTES # BLD AUTO: 12.3 % — LOW (ref 20.5–51.1)
LYMPHOCYTES # BLD AUTO: 2.19 K/UL — SIGNIFICANT CHANGE UP (ref 1.2–3.4)
MAGNESIUM SERPL-MCNC: 2.3 MG/DL — SIGNIFICANT CHANGE UP (ref 1.8–2.4)
MCHC RBC-ENTMCNC: 29.2 PG — SIGNIFICANT CHANGE UP (ref 27–31)
MCHC RBC-ENTMCNC: 33.4 G/DL — SIGNIFICANT CHANGE UP (ref 32–37)
MCV RBC AUTO: 87.3 FL — SIGNIFICANT CHANGE UP (ref 80–94)
MONOCYTES # BLD AUTO: 1.57 K/UL — HIGH (ref 0.1–0.6)
MONOCYTES NFR BLD AUTO: 8.8 % — SIGNIFICANT CHANGE UP (ref 1.7–9.3)
NEUTROPHILS # BLD AUTO: 13.85 K/UL — HIGH (ref 1.4–6.5)
NEUTROPHILS NFR BLD AUTO: 77.6 % — HIGH (ref 42.2–75.2)
NRBC # BLD: 0 /100 WBCS — SIGNIFICANT CHANGE UP (ref 0–0)
PLATELET # BLD AUTO: 424 K/UL — HIGH (ref 130–400)
POTASSIUM SERPL-MCNC: 3.9 MMOL/L — SIGNIFICANT CHANGE UP (ref 3.5–5)
POTASSIUM SERPL-SCNC: 3.9 MMOL/L — SIGNIFICANT CHANGE UP (ref 3.5–5)
PROT SERPL-MCNC: 6.1 G/DL — SIGNIFICANT CHANGE UP (ref 6–8)
RBC # BLD: 4.73 M/UL — SIGNIFICANT CHANGE UP (ref 4.7–6.1)
RBC # FLD: 13.4 % — SIGNIFICANT CHANGE UP (ref 11.5–14.5)
SODIUM SERPL-SCNC: 138 MMOL/L — SIGNIFICANT CHANGE UP (ref 135–146)
SURGICAL PATHOLOGY STUDY: SIGNIFICANT CHANGE UP
WBC # BLD: 17.83 K/UL — HIGH (ref 4.8–10.8)
WBC # FLD AUTO: 17.83 K/UL — HIGH (ref 4.8–10.8)

## 2018-10-25 PROCEDURE — 99221 1ST HOSP IP/OBS SF/LOW 40: CPT

## 2018-10-25 RX ORDER — CLOPIDOGREL BISULFATE 75 MG/1
1 TABLET, FILM COATED ORAL
Qty: 0 | Refills: 0 | COMMUNITY

## 2018-10-25 RX ADMIN — Medication 70 MILLIGRAM(S): at 05:50

## 2018-10-25 RX ADMIN — Medication 100 MILLIGRAM(S): at 12:44

## 2018-10-25 RX ADMIN — AMLODIPINE BESYLATE 10 MILLIGRAM(S): 2.5 TABLET ORAL at 05:50

## 2018-10-25 RX ADMIN — ASPIRIN AND DIPYRIDAMOLE 1 CAPSULE(S): 25; 200 CAPSULE, EXTENDED RELEASE ORAL at 05:50

## 2018-10-25 RX ADMIN — LEVETIRACETAM 1000 MILLIGRAM(S): 250 TABLET, FILM COATED ORAL at 05:51

## 2018-10-25 RX ADMIN — PANTOPRAZOLE SODIUM 40 MILLIGRAM(S): 20 TABLET, DELAYED RELEASE ORAL at 05:50

## 2018-10-25 RX ADMIN — ENOXAPARIN SODIUM 40 MILLIGRAM(S): 100 INJECTION SUBCUTANEOUS at 12:45

## 2018-10-25 RX ADMIN — Medication 500000 UNIT(S): at 05:51

## 2018-10-25 RX ADMIN — Medication 25 MILLIGRAM(S): at 05:50

## 2018-10-25 RX ADMIN — Medication 10 MILLIGRAM(S): at 06:00

## 2018-10-25 RX ADMIN — FINASTERIDE 5 MILLIGRAM(S): 5 TABLET, FILM COATED ORAL at 12:44

## 2018-10-25 RX ADMIN — LOSARTAN POTASSIUM 100 MILLIGRAM(S): 100 TABLET, FILM COATED ORAL at 05:50

## 2018-10-25 NOTE — PROGRESS NOTE ADULT - PROVIDER SPECIALTY LIST ADULT
Hospitalist
Internal Medicine
Neurology
Pulmonology
Vascular Surgery
Internal Medicine

## 2018-10-25 NOTE — PROGRESS NOTE ADULT - SUBJECTIVE AND OBJECTIVE BOX
Pt seen and examined independently. No new complaints. Feeling well. D/w vascular, neuro - outpt f/u.    Gen:NAD, alert follows commands, +severe expr aphasia  CV: S1 S2  Resp: Decreased BS b/l  GI: NT/ND/S +BS  MS: neg c/c/e. B/l extremities warm, +pulses (somewhat diminished)  Neuro: L side 1-/5, rest 5/5. Still decreased vision Rt eye;    Discharge instructions discussed and patient/spouse know when to seek immediate medical attention.  Patient has proper follow up.  All results discussed and patient/spouse aware they require further work up.  Stressed importance of proper follow up.  Medications prescribed and changes discussed.  All questions and concerns from patient and family addressed. Understanding of instructions verbalized.    Time spent in completing discharge process and coordinating care 45 minutes.

## 2018-10-25 NOTE — DISCHARGE NOTE ADULT - CARE PROVIDER_API CALL
Eber Mijares), Neurology; Neuromuscular Medicine  [address] [facility]  [address]  [place], [place] [address]  Phone: [phone]  Fax: [phone]    Surjit Castellano (MD), [address] [place] [address]  Phone: [phone]  Fax: [phone]    Noreen Richardson (), Internal Medicine  [address] [place] [address]  Phone: [phone]  Fax: [phone]    Matias Soni), Vascular Surgery  [address] [address]  Phone: [phone]  Fax: [phone] Noreen Richardson (), Internal Medicine  [address] [place] [address]  Phone: [phone]  Fax: [phone]    Matias Soni), Vascular Surgery  [address] [address]  Phone: [phone]  Fax: [phone]    Bebo Mcknight), Ophthalmology  [address] [place] [address]  Phone: [phone]  Fax: [phone]    Eric Francis), EEGEpilepsy; Neurology  [address] [facility]  [address]  [place] [address]  Phone: [phone]  Fax: [phone]

## 2018-10-25 NOTE — CONSULT NOTE ADULT - CONSULT REQUESTED DATE/TIME
19-Oct-2018 13:42
19-Oct-2018 11:15
19-Oct-2018 13:42
19-Oct-2018 14:34
19-Oct-2018 17:26
21-Oct-2018 17:33
23-Oct-2018 18:17
25-Oct-2018 08:18

## 2018-10-25 NOTE — DISCHARGE NOTE ADULT - PROVIDER TOKENS
TOKEN:'[id number]:'[id number]:'[id number]:'[id number]' TOKEN:'[id number]:'[id number]:'[id number]:'[id number]'

## 2018-10-25 NOTE — DISCHARGE NOTE ADULT - HOSPITAL COURSE
78y Male with h/o hemorrhagic CVA w/ residual spastic RHP and expressive aphasia presented to ED for c/o Right eye vision loss  As per family , patient suddenly developed Right eye vision loss , painless this morning of presentation. He kept on pointing at the right eye. Denies fever, chills, headache, nausea, vomiting .  baseline bedbound, and constant right sided 0/5  since cva.    During his course here, an ESR was taken and it was elevated at 70.  An MRI of the brain was done showing no acute changes.  he was started on steroids and has been diagnosed with temporal arteritis.  He was seen by ophthalmology and medicine, vascular surgery did a right sided temporal artery biopsy and he can follow up as an outpatient.  He is medically fit for discharge.

## 2018-10-25 NOTE — DISCHARGE NOTE ADULT - PLAN OF CARE
prevent progression Please continue with the steroids and pantoprazole and follow up with rheumatology prevent  complications please continue with amlodipine and losartan please  continue with your atorvastatin and follow a DASH diet Please continue with the Prednisone 60mg daily until follow up with rheumatologist/neurologist. Follow up with rheumatology/neurology/vascular surgery/ophthalmologist follow up with vascular Please continue with the Prednisone 60mg daily until follow up with rheumatologist/neurologist. Follow up with rheumatology/neurology/vascular surgery/ophthalmologist. Keep the biopsy site clean and remove dressing after 5 days. prevent complications

## 2018-10-25 NOTE — CONSULT NOTE ADULT - ASSESSMENT
Assessment:   - Onychomycosis  - b/l foot pain     Plan:  - pt seen/evaluated @ bedside w/ attending   - nails debrided w/o incident  - consider PT evaluation  - Vasc consult for non-palpable pulses  - Plan discussed w/ attending   - Pt can f/u in clinic as outpatient   - re-consult podiatry PRN Assessment:   - Onychomycosis  - b/l foot pain     Plan:  - pt seen/evaluated @ bedside w/ attending   - nails debrided w/o incident  - consider PT evaluation  - Patient evaluated w/ attending   - Pt can f/u in clinic as outpatient   - re-consult podiatry PRN  - See attending attestation

## 2018-10-25 NOTE — CONSULT NOTE ADULT - ATTENDING COMMENTS
[age] year old stroke with residual right hemiplegia and L ICA occlusion.  Now with new right eye blindness.  Pt seen and examined.  DW wife who was present.  Awaiting carotid doppler and ophtho examination prior to making final recommendations
patient has pain at right medial calcaneal tubercle and medial band of the plantar fascia. Patients pain is exacerbated with WB. Recommend Rehab/PT evaluation for plantar fasciitis and instructions on plantar fascia stretches. Patient can follow up as out patient at [address] [phone].    Thank you for allowing me to participate in your patient care  Franck Blue DPM

## 2018-10-25 NOTE — PROGRESS NOTE ADULT - SUBJECTIVE AND OBJECTIVE BOX
79 y/o male S/P R temporal artery biopsy    Subjective: Pt is doing fine and lying comfortably in bed. He does not have any active complaint at this time. He is confused. Per wife this is his baseline    YAHAIRA MONTIEL  78y Male   [id number]    Hospital Day:   Post Operative Day:    Procedure/dx:  Events of the Last 24h:    Patient is a 78y old  Male who presents with a chief complaint of stroke (24 Oct 2018 18:12)    PAST MEDICAL & SURGICAL HISTORY:  Right sided weakness  High cholesterol  Enlarged prostate  HTN (hypertension)  Hemorrhagic stroke  Bilateral cataracts      Vital Signs Last 24 Hrs  T(C): 36.6 (24 Oct 2018 23:55), Max: 36.9 (24 Oct 2018 09:40)  T(F): 97.8 (24 Oct 2018 23:55), Max: 98.4 (24 Oct 2018 09:40)  HR: 68 (25 Oct 2018 05:49) (58 - 73)  BP: 191/85 (25 Oct 2018 05:49) (104/54 - 191/85)  BP(mean): --  RR: 18 (24 Oct 2018 23:55) (10 - 18)  SpO2: 96% (24 Oct 2018 10:46) (91% - 98%)        Diet, DASH/TLC:   Sodium & Cholesterol Restricted (10-24-18 @ 09:48)      I&O's Detail    24 Oct 2018 07:01  -  25 Oct 2018 05:55  --------------------------------------------------------  IN:    lactated ringers.: 150 mL  Total IN: 150 mL    OUT:    Voided: 1 mL  Total OUT: 1 mL    Total NET: 149 mL          MEDICATIONS  (STANDING):  amLODIPine   Tablet 10 milliGRAM(s) Oral daily  atorvastatin 80 milliGRAM(s) Oral at bedtime  dipyridamole 200 mG/aspirin 25 mG 1 Capsule(s) Oral two times a day  docusate sodium 100 milliGRAM(s) Oral daily  enoxaparin Injectable 40 milliGRAM(s) SubCutaneous daily  finasteride 5 milliGRAM(s) Oral daily  hydrochlorothiazide 25 milliGRAM(s) Oral daily  levETIRAcetam 1000 milliGRAM(s) Oral two times a day  losartan 100 milliGRAM(s) Oral daily  methylPREDNISolone sodium succinate Injectable 70 milliGRAM(s) IV Push daily  nystatin    Suspension 582075 Unit(s) Oral two times a day  pantoprazole    Tablet 40 milliGRAM(s) Oral before breakfast  senna 2 Tablet(s) Oral at bedtime    MEDICATIONS  (PRN):  acetaminophen   Tablet .. 650 milliGRAM(s) Oral every 6 hours PRN Mild Pain (1 - 3)  baclofen 10 milliGRAM(s) Oral every 8 hours PRN Muscle Spasm  HYDROmorphone  Injectable 0.5 milliGRAM(s) IV Push every 10 minutes PRN Moderate Pain (4 - 6)  HYDROmorphone  Injectable 1 milliGRAM(s) IV Push every 10 minutes PRN Severe Pain (7 - 10)  ondansetron Injectable 4 milliGRAM(s) IV Push once PRN Nausea and/or Vomiting      Physical Examination:  General Appearance: NAD, AAOx0, baseline as per wife  HEENT:  R temporal tenderness at incision site. No bleeding or discharge noted  Heart: S1 and S2.  Lungs: Clear to auscultation BL  Abdomen:  Soft, nondistended, nontender.  Neuro: grosly intact   Wounds/Incions: R temporal incisions w/ dressings in place, clean, dry, intact, no signs of infection       LABS:              13.5   17.86 )-----------( 422      ( 23 Oct 2018 06:11 )             40.2        10-23    141  |  97<L>  |  47<H>  ----------------------------<  164<H>  4.1   |  25  |  1.3    Ca    8.6      23 Oct 2018 16:14  Mg     2.3     10-23    TPro  6.1  /  Alb  3.6  /  TBili  0.3  /  DBili  x   /  AST  8   /  ALT  14  /  AlkPhos  82  10-23    LIVER FUNCTIONS - ( 23 Oct 2018 06:11 )  Alb: 3.6 g/dL / Pro: 6.1 g/dL / ALK PHOS: 82 U/L / ALT: 14 U/L / AST: 8 U/L / GGT: x           PT/INR - ( 23 Oct 2018 16:14 )   PT: 12.10 sec;   INR: 1.05 ratio         PTT - ( 23 Oct 2018 16:14 )  PTT:36.6 sec

## 2018-10-25 NOTE — DISCHARGE NOTE ADULT - ADDITIONAL INSTRUCTIONS
Please follow up with rheumatology Dr. Richardson at [address] [facility], [phone] for the temporal arteritis.  Please also follow up with Dr. Castellano your PCP and Dr. Mijares for neurology.  Please follow up with Dr. Soni for your temporal artery biopsy as well as your blood flow in your lower legs as seen by podiatry.  Please continue with your steroids and follow up with rheumatology about the full course. Please follow up with rheumatology Dr. Richardson at [address] [facility], [phone] for the temporal arteritis.  Please also follow up with Dr. Castellaon your PCP and Dr. Guzman for neurology.  Please follow up with Dr. Soni for your temporal artery biopsy results, carotid stenosis, as well as your blood flow in your lower legs. Follow up with ophthalmology Dr. Mcknight  Please continue with your steroids and follow up with rheumatology about the full course. Please follow up with rheumatology Dr. Richardson at [address] [facility], [phone] for the temporal arteritis.  Please also follow up with Dr. Castellano your PCP and Dr. Guzman for neurology.  Please follow up with Dr. Soni for your temporal artery biopsy results, carotid stenosis, as well as your blood flow in your lower legs. Follow up with ophthalmology Dr. Mcknight  Please continue with your steroids and follow up with rheumatology about the full course.  Please follow up with your cardiologist about getting an echo for history of CHF

## 2018-10-25 NOTE — CONSULT NOTE ADULT - CONSULT REQUESTED BY NAME
DR MALCOLM
Dr. Quinonez
Franklin, Jaycee
Internal Medicine
Medicine
hospitalist
medicine
medical team

## 2018-10-25 NOTE — DISCHARGE NOTE ADULT - MEDICATION SUMMARY - MEDICATIONS TO TAKE
I will START or STAY ON the medications listed below when I get home from the hospital:    finasteride 5 mg oral tablet  -- 1 tab(s) by mouth once a day  -- Indication: For BPH    predniSONE 20 mg oral tablet  -- 2 tab(s) by mouth once a day   -- It is very important that you take or use this exactly as directed.  Do not skip doses or discontinue unless directed by your doctor.  Obtain medical advice before taking any non-prescription drugs as some may affect the action of this medication.  Take with food or milk.    -- Indication: For Temporal arteritis    losartan 100 mg oral tablet  -- 1 tab(s) by mouth once a day  -- Indication: For HTN (hypertension)    levETIRAcetam 1000 mg oral tablet  -- 1 tab(s) by mouth 2 times a day  seizures  -- Indication: For seizures    atorvastatin 40 mg oral tablet  -- 1 tab(s) by mouth once a day  -- Indication: For HLD    clopidogrel 75 mg oral tablet  -- 1 tab(s) by mouth once a day  -- Indication: For antiplatelet    Aggrenox 25 mg-200 mg oral capsule, extended release  -- 1 cap(s) by mouth 2 times a day  -- Indication: For antiplatelet    Tessalon Perles 100 mg oral capsule  -- 1 cap(s) by mouth every 8 hours   -- May cause drowsiness.  Alcohol may intensify this effect.  Use care when operating dangerous machinery.  Swallow whole.  Do not crush.    -- Indication: For cough    amLODIPine 10 mg oral tablet  -- 1 tab(s) by mouth once a day  -- Indication: For HTN (hypertension)    hydroCHLOROthiazide 25 mg oral tablet  -- 1 tab(s) by mouth once a day  -- Indication: For HTN (hypertension)    baclofen 10 mg oral tablet  -- 1 tab(s) by mouth once a day  Spasms - stroke h/x  -- Indication: For muscle spasms    Protonix 40 mg oral delayed release tablet  -- 1 tab(s) by mouth once a day   UGIB / Gastritis   -- It is very important that you take or use this exactly as directed.  Do not skip doses or discontinue unless directed by your doctor.  Obtain medical advice before taking any non-prescription drugs as some may affect the action of this medication.  Swallow whole.  Do not crush.    -- Indication: For GI PPX for steroids I will START or STAY ON the medications listed below when I get home from the hospital:    finasteride 5 mg oral tablet  -- 1 tab(s) by mouth once a day  -- Indication: For BPH    predniSONE 20 mg oral tablet  -- 3 tab(s) by mouth once a day   -- It is very important that you take or use this exactly as directed.  Do not skip doses or discontinue unless directed by your doctor.  Obtain medical advice before taking any non-prescription drugs as some may affect the action of this medication.  Take with food or milk.    -- Indication: For suspected temporal arteritis    losartan 100 mg oral tablet  -- 1 tab(s) by mouth once a day  -- Indication: For HTN (hypertension)    levETIRAcetam 1000 mg oral tablet  -- 1 tab(s) by mouth 2 times a day  seizures  -- Indication: For seizures    atorvastatin 40 mg oral tablet  -- 1 tab(s) by mouth once a day  -- Indication: For HLD    Aggrenox 25 mg-200 mg oral capsule, extended release  -- 1 cap(s) by mouth 2 times a day  -- Indication: For antiplatelet    Tessalon Perles 100 mg oral capsule  -- 1 cap(s) by mouth every 8 hours   -- May cause drowsiness.  Alcohol may intensify this effect.  Use care when operating dangerous machinery.  Swallow whole.  Do not crush.    -- Indication: For cough    amLODIPine 10 mg oral tablet  -- 1 tab(s) by mouth once a day  -- Indication: For HTN (hypertension)    hydroCHLOROthiazide 25 mg oral tablet  -- 1 tab(s) by mouth once a day  -- Indication: For HTN (hypertension)    baclofen 10 mg oral tablet  -- 1 tab(s) by mouth once a day  Spasms - stroke h/x  -- Indication: For muscle spasms    Protonix 40 mg oral delayed release tablet  -- 1 tab(s) by mouth once a day   UGIB / Gastritis   -- It is very important that you take or use this exactly as directed.  Do not skip doses or discontinue unless directed by your doctor.  Obtain medical advice before taking any non-prescription drugs as some may affect the action of this medication.  Swallow whole.  Do not crush.    -- Indication: For GI PPX for steroids

## 2018-10-25 NOTE — PROGRESS NOTE ADULT - REASON FOR ADMISSION
stroke
stroke / rt eye vison lost
stroke

## 2018-10-25 NOTE — DISCHARGE NOTE ADULT - MEDICATION SUMMARY - MEDICATIONS TO STOP TAKING
I will STOP taking the medications listed below when I get home from the hospital:    Levaquin 750 mg oral tablet  -- 1 tab(s) by mouth once a day   -- Avoid prolonged or excessive exposure to direct and/or artificial sunlight while taking this medication.  Do not take dairy products, antacids, or iron preparations within one hour of this medication.  Finish all this medication unless otherwise directed by prescriber.  May cause drowsiness or dizziness.  Medication should be taken with plenty of water. I will STOP taking the medications listed below when I get home from the hospital:    clopidogrel 75 mg oral tablet  -- 1 tab(s) by mouth once a day    Levaquin 750 mg oral tablet  -- 1 tab(s) by mouth once a day   -- Avoid prolonged or excessive exposure to direct and/or artificial sunlight while taking this medication.  Do not take dairy products, antacids, or iron preparations within one hour of this medication.  Finish all this medication unless otherwise directed by prescriber.  May cause drowsiness or dizziness.  Medication should be taken with plenty of water.

## 2018-10-25 NOTE — DISCHARGE NOTE ADULT - PATIENT PORTAL LINK FT
You can access the [facility] Patient Portal, offered by [facility], by registering with the following website: [email or web address]

## 2018-10-25 NOTE — DISCHARGE NOTE ADULT - CARE PLAN
Principal Discharge DX:	Temporal arteritis  Goal:	prevent progression  Assessment and plan of treatment:	Please continue with the steroids and pantoprazole and follow up with rheumatology  Secondary Diagnosis:	HTN (hypertension)  Goal:	prevent  complications  Assessment and plan of treatment:	please continue with amlodipine and losartan  Secondary Diagnosis:	High cholesterol  Goal:	prevent  complications  Assessment and plan of treatment:	please  continue with your atorvastatin and follow a DASH diet Principal Discharge DX:	Temporal arteritis  Goal:	prevent progression  Assessment and plan of treatment:	Please continue with the Prednisone 60mg daily until follow up with rheumatologist/neurologist. Follow up with rheumatology/neurology/vascular surgery/ophthalmologist  Secondary Diagnosis:	Stenosis of carotid artery, unspecified laterality  Assessment and plan of treatment:	follow up with vascular Principal Discharge DX:	Temporal arteritis  Goal:	prevent progression  Assessment and plan of treatment:	Please continue with the Prednisone 60mg daily until follow up with rheumatologist/neurologist. Follow up with rheumatology/neurology/vascular surgery/ophthalmologist. Keep the biopsy site clean and remove dressing after 5 days.  Secondary Diagnosis:	Stenosis of carotid artery, unspecified laterality  Assessment and plan of treatment:	follow up with vascular Principal Discharge DX:	Temporal arteritis  Goal:	prevent progression  Assessment and plan of treatment:	Please continue with the Prednisone 60mg daily until follow up with rheumatologist/neurologist. Follow up with rheumatology/neurology/vascular surgery/ophthalmologist. Keep the biopsy site clean and remove dressing after 5 days.  Secondary Diagnosis:	Stenosis of carotid artery, unspecified laterality  Goal:	prevent complications  Assessment and plan of treatment:	follow up with vascular

## 2018-10-25 NOTE — CONSULT NOTE ADULT - SUBJECTIVE AND OBJECTIVE BOX
PROGRESS NOTE   Patient is a 78y old  Male who presents with a chief complaint of stroke (25 Oct 2018 05:54)      HPI:  78y Male with h/o hemorrhagic CVA w/ residual spastic RHP and expressive aphasia presented to ED for c/o Right eye vision loss  As per family , patient suddenly developed Right eye vision loss , painless this morning of presentation. He kept on pointing at the right eye. Denies fever, chills, headache, nausea, vomiting .  baseline bedbound, and constant right sided 0/5  since cva (18 Oct 2018 15:28)      VISION CHANGES  ^VISION CHANGES  H/o or current diagnosis of HF- ACEI/ARB contraindication unknown  Yes  No pertinent family history in first degree relatives  Handoff  MEWS Score  Right sided weakness  High cholesterol  Enlarged prostate  HTN (hypertension)  Hemorrhagic stroke  Temporal arteritis  Temporal arteritis  Vision changes  Temporal artery biopsy, right  Bilateral cataracts  VISION LOSS/RIGHT EYE  35      VITALS:  Vital Signs Last 24 Hrs  T(C): 36 (25 Oct 2018 07:30), Max: 36.9 (24 Oct 2018 09:40)  T(F): 96.8 (25 Oct 2018 07:30), Max: 98.4 (24 Oct 2018 09:40)  HR: 69 (25 Oct 2018 07:30) (58 - 73)  BP: 132/73 (25 Oct 2018 07:30) (104/54 - 191/85)  BP(mean): --  RR: 16 (25 Oct 2018 07:30) (10 - 18)  SpO2: 96% (24 Oct 2018 10:46) (91% - 98%)    LABS:                        13.8   17.83 )-----------( 424      ( 25 Oct 2018 05:55 )             41.3     10-25    138  |  98  |  28<H>  ----------------------------<  104<H>  3.9   |  25  |  0.9    Ca    8.6      25 Oct 2018 05:55  Mg     2.3     10-25    TPro  6.1  /  Alb  3.6  /  TBili  0.4  /  DBili  x   /  AST  10  /  ALT  16  /  AlkPhos  83  10-25    PT/INR - ( 23 Oct 2018 16:14 )   PT: 12.10 sec;   INR: 1.05 ratio         PTT - ( 23 Oct 2018 16:14 )  PTT:36.6 sec  Hemoglobin A1C     PHYSICAL EXAM  GEN: YAHAIRA MONTIEL is a pleasant well-nourished, well developed 78y Male in no acute distress, alert awake, and oriented to person, place and time.     Medication(s):   acetaminophen   Tablet .. 650 milliGRAM(s) Oral every 6 hours PRN  amLODIPine   Tablet 10 milliGRAM(s) Oral daily  atorvastatin 80 milliGRAM(s) Oral at bedtime  baclofen 10 milliGRAM(s) Oral every 8 hours PRN  dipyridamole 200 mG/aspirin 25 mG 1 Capsule(s) Oral two times a day  docusate sodium 100 milliGRAM(s) Oral daily  enoxaparin Injectable 40 milliGRAM(s) SubCutaneous daily  finasteride 5 milliGRAM(s) Oral daily  hydrochlorothiazide 25 milliGRAM(s) Oral daily  levETIRAcetam 1000 milliGRAM(s) Oral two times a day  losartan 100 milliGRAM(s) Oral daily  methylPREDNISolone sodium succinate Injectable 70 milliGRAM(s) IV Push daily  nystatin    Suspension [id number] Unit(s) Oral two times a day  pantoprazole    Tablet 40 milliGRAM(s) Oral before breakfast  senna 2 Tablet(s) Oral at bedtime      LE Focused Exam:      Vasc:    - DP/PT pulses non-palpable B/L  - Skin temp warm to warm, proximal to distal B/L  - CFT < 3 sec B/L    Neuro:   - Gross sensation in tact to level of the digits B/L     Derm:   - No interdigital macerations B/L   - No lesions/wounds to LE B/L   - nails thick, elongated, dystrophic and discolored     MSK:   - Muscle strength 2/5 in all quadrants w/ no defects B/L

## 2018-10-25 NOTE — DISCHARGE NOTE ADULT - CARE PROVIDERS DIRECT ADDRESSES
,[email or web address] ,[email or web address],[email or web address]

## 2018-11-01 DIAGNOSIS — H26.9 UNSPECIFIED CATARACT: ICD-10-CM

## 2018-11-01 DIAGNOSIS — I65.23 OCCLUSION AND STENOSIS OF BILATERAL CAROTID ARTERIES: ICD-10-CM

## 2018-11-01 DIAGNOSIS — I10 ESSENTIAL (PRIMARY) HYPERTENSION: ICD-10-CM

## 2018-11-01 DIAGNOSIS — B35.1 TINEA UNGUIUM: ICD-10-CM

## 2018-11-01 DIAGNOSIS — N40.0 BENIGN PROSTATIC HYPERPLASIA WITHOUT LOWER URINARY TRACT SYMPTOMS: ICD-10-CM

## 2018-11-01 DIAGNOSIS — I69.320 APHASIA FOLLOWING CEREBRAL INFARCTION: ICD-10-CM

## 2018-11-01 DIAGNOSIS — M31.6 OTHER GIANT CELL ARTERITIS: ICD-10-CM

## 2018-11-01 DIAGNOSIS — R53.1 WEAKNESS: ICD-10-CM

## 2018-11-01 DIAGNOSIS — H53.9 UNSPECIFIED VISUAL DISTURBANCE: ICD-10-CM

## 2018-11-01 DIAGNOSIS — Z87.01 PERSONAL HISTORY OF PNEUMONIA (RECURRENT): ICD-10-CM

## 2018-11-01 DIAGNOSIS — H53.131 SUDDEN VISUAL LOSS, RIGHT EYE: ICD-10-CM

## 2018-11-01 DIAGNOSIS — R29.810 FACIAL WEAKNESS: ICD-10-CM

## 2018-11-01 DIAGNOSIS — G40.909 EPILEPSY, UNSPECIFIED, NOT INTRACTABLE, WITHOUT STATUS EPILEPTICUS: ICD-10-CM

## 2018-11-01 DIAGNOSIS — E78.5 HYPERLIPIDEMIA, UNSPECIFIED: ICD-10-CM

## 2018-11-01 DIAGNOSIS — H47.10 UNSPECIFIED PAPILLEDEMA: ICD-10-CM

## 2018-11-01 DIAGNOSIS — I69.351 HEMIPLEGIA AND HEMIPARESIS FOLLOWING CEREBRAL INFARCTION AFFECTING RIGHT DOMINANT SIDE: ICD-10-CM

## 2018-11-01 DIAGNOSIS — Z88.0 ALLERGY STATUS TO PENICILLIN: ICD-10-CM

## 2018-11-01 DIAGNOSIS — Z74.01 BED CONFINEMENT STATUS: ICD-10-CM

## 2018-11-20 ENCOUNTER — APPOINTMENT (OUTPATIENT)
Dept: VASCULAR SURGERY | Facility: CLINIC | Age: 78
End: 2018-11-20

## 2019-04-23 ENCOUNTER — OUTPATIENT (OUTPATIENT)
Dept: OUTPATIENT SERVICES | Facility: HOSPITAL | Age: 79
LOS: 1 days | Discharge: HOME | End: 2019-04-23

## 2019-04-23 DIAGNOSIS — H26.9 UNSPECIFIED CATARACT: Chronic | ICD-10-CM

## 2019-04-24 DIAGNOSIS — I63.9 CEREBRAL INFARCTION, UNSPECIFIED: ICD-10-CM

## 2019-04-24 DIAGNOSIS — Z00.00 ENCOUNTER FOR GENERAL ADULT MEDICAL EXAMINATION WITHOUT ABNORMAL FINDINGS: ICD-10-CM

## 2019-04-24 DIAGNOSIS — J44.9 CHRONIC OBSTRUCTIVE PULMONARY DISEASE, UNSPECIFIED: ICD-10-CM

## 2019-04-24 DIAGNOSIS — B35.6 TINEA CRURIS: ICD-10-CM

## 2019-04-24 DIAGNOSIS — G81.91 HEMIPLEGIA, UNSPECIFIED AFFECTING RIGHT DOMINANT SIDE: ICD-10-CM

## 2019-10-14 ENCOUNTER — APPOINTMENT (OUTPATIENT)
Dept: NEUROLOGY | Facility: CLINIC | Age: 79
End: 2019-10-14

## 2019-10-17 ENCOUNTER — RX RENEWAL (OUTPATIENT)
Age: 79
End: 2019-10-17

## 2019-10-17 DIAGNOSIS — G40.909 EPILEPSY, UNSPECIFIED, NOT INTRACTABLE, W/OUT STATUS EPILEPTICUS: ICD-10-CM

## 2019-10-17 RX ORDER — CLONAZEPAM 1 MG/1
1 TABLET, ORALLY DISINTEGRATING ORAL DAILY
Qty: 30 | Refills: 0 | Status: ACTIVE | COMMUNITY
Start: 2019-10-17 | End: 1900-01-01

## 2020-09-05 NOTE — PROGRESS NOTE ADULT - PROVIDER SPECIALTY LIST ADULT
Gastroenterology
Gastroenterology
Hospitalist
Internal Medicine
Neurology
no concerns

## 2022-05-04 NOTE — PROGRESS NOTE ADULT - PROBLEM SELECTOR PLAN 1
vomiting vomiting vomiting vomiting vomiting vomiting vomiting vomiting vomiting - eeg shows left hemispheric focal  slowing and left hemispheric sharp transients  - C/W keppra per neuro

## 2022-10-14 NOTE — STROKE CODE NOTE - NS AS ED PRESENTATION YN
Yes Complex Repair And Flap Additional Text (Will Appearing After The Standard Complex Repair Text): The complex repair was not sufficient to completely close the primary defect. The remaining additional defect was repaired with the flap mentioned below.

## 2023-04-04 NOTE — ED ADULT NURSE REASSESSMENT NOTE - NS ED NURSE REASSESS COMMENT FT1
Pt lying in bed at this time w/ spouse at bedside. Pt V/S WNL; pt on 3.5L O2 via NC. Shaking/tremors still present at this time; facial twitching to right side noted. Pt endorsed to ANGÉLICA baptiste. Opt out

## 2024-06-18 NOTE — PROGRESS NOTE ADULT - SUBJECTIVE AND OBJECTIVE BOX
Problem: Patient/Family Goals  Goal: Patient/Family Long Term Goal  Description: Patient's Long Term Goal: Discharge home    Interventions:  - See additional Care Plan goals for specific interventions  Outcome: Progressing  Goal: Patient/Family Short Term Goal  Description: Patient's Short Term Goal:   6/17 NOC: Control cough  6/18 Have better breathing  Interventions:   -PRN meds  - See additional Care Plan goals for specific interventions  Outcome: Progressing     Problem: Impaired Communication  Goal: Patient will achieve maximal communication potential  Description: Interventions:    Outcome: Progressing     Problem: Diabetes/Glucose Control  Goal: Glucose maintained within prescribed range  Description: INTERVENTIONS:  - Monitor Blood Glucose as ordered  - Assess for signs and symptoms of hyperglycemia and hypoglycemia  - Administer ordered medications to maintain glucose within target range  - Assess barriers to adequate nutritional intake and initiate nutrition consult as needed  - Instruct patient on self management of diabetes  Outcome: Progressing      YAHAIRA MONTIEL  78y  [facility] [address] [place] [address]      Patient is a 78y old  Male who presents with a chief complaint of Right side tremors (31 Mar 2018 14:42)      INTERVAL HPI/OVERNIGHT EVENTS:  no events overnight, agreeable to egd, increased keppra per neurology recommendation      REVIEW OF SYSTEMS:  CONSTITUTIONAL: No fever, weight loss, or fatigue  EYES: No eye pain, visual disturbances, or discharge  ENMT:  No difficulty hearing, tinnitus, vertigo; No sinus or throat pain  NECK: No pain or stiffness  BREASTS: No pain, masses, or nipple discharge  RESPIRATORY: No cough, wheezing, chills or hemoptysis; No shortness of breath  CARDIOVASCULAR: No chest pain, palpitations, dizziness, or leg swelling  GASTROINTESTINAL: No abdominal or epigastric pain. No nausea, vomiting, or hematemesis; No diarrhea or constipation. No melena or hematochezia.  GENITOURINARY: No dysuria, frequency, hematuria, or incontinence  NEUROLOGICAL: No headaches, memory loss, loss of strength, numbness, or tremors  SKIN: No itching, burning, rashes, or lesions   LYMPH NODES: No enlarged glands  ENDOCRINE: No heat or cold intolerance; No hair loss  MUSCULOSKELETAL: No joint pain or swelling; No muscle, back, or extremity pain  PSYCHIATRIC: No depression, anxiety, mood swings, or difficulty sleeping  HEME/LYMPH: No easy bruising, or bleeding gums  ALLERY AND IMMUNOLOGIC: No hives or eczema  FAMILY HISTORY:    T(C): 36.1 (04-02-18 @ 05:48), Max: 36.6 (04-01-18 @ 14:12)  HR: 76 (04-02-18 @ 05:48) (66 - 76)  BP: 160/74 (04-02-18 @ 05:48) (133/61 - 160/74)  RR: 16 (04-02-18 @ 05:48) (16 - 16)  SpO2: --  Wt(kg): --Vital Signs Last 24 Hrs  T(C): 36.1 (02 Apr 2018 05:48), Max: 36.6 (01 Apr 2018 14:12)  T(F): 96.9 (02 Apr 2018 05:48), Max: 97.8 (01 Apr 2018 14:12)  HR: 76 (02 Apr 2018 05:48) (66 - 76)  BP: 160/74 (02 Apr 2018 05:48) (133/61 - 160/74)  BP(mean): --  RR: 16 (02 Apr 2018 05:48) (16 - 16)  SpO2: --    PHYSICAL EXAM:  LUNGS: Clear to auscultation bilaterally   HEART: S1/S2 present. RRR.   ABD: Soft, non-tender, non-distended. Bowel sounds present  EXT: no p. edema  NEURO:  single word sentences.  follows commands.  severe aphasia.  Cranial nerves: EOMI w/o nystagmus.  PERRLA.  Right facial droop + . Facial sensation is normal with normal bite.  Motor examination:   spastic R hemiplegia.  LUE/LLE 5/5    Consultant(s) Notes Reviewed:  [x ] YES  [ ] NO  Care Discussed with Consultants/Other Providers [ x] YES  [ ] NO    LABS:                            15.7   11.24 )-----------( 285      ( 02 Apr 2018 07:07 )             46.8   04-02    138  |  95<L>  |  17  ----------------------------<  80  3.6   |  28  |  0.9    Ca    9.3      02 Apr 2018 07:07  Mg     1.9     04-01    TPro  5.7<L>  /  Alb  3.8  /  TBili  1.2  /  DBili  x   /  AST  13  /  ALT  25  /  AlkPhos  79  04-01            amLODIPine   Tablet 10 milliGRAM(s) Oral daily  aspirin  chewable 81 milliGRAM(s) Oral daily  atorvastatin 40 milliGRAM(s) Oral at bedtime  baclofen 10 milliGRAM(s) Oral every 12 hours  enoxaparin Injectable 40 milliGRAM(s) SubCutaneous at bedtime  finasteride 5 milliGRAM(s) Oral daily  hydrochlorothiazide 25 milliGRAM(s) Oral daily  levETIRAcetam 1000 milliGRAM(s) Oral two times a day  losartan 100 milliGRAM(s) Oral daily  magnesium oxide 400 milliGRAM(s) Oral once  pantoprazole  Injectable 40 milliGRAM(s) IV Push two times a day  sodium chloride 0.9%. 1000 milliLiter(s) IV Continuous <Continuous>  traMADol 25 milliGRAM(s) Oral two times a day PRN      HEALTH ISSUES - PROBLEM Dx:  Hematemesis/vomiting blood: Hematemesis/vomiting blood  Hematemesis, presence of nausea not specified: Hematemesis, presence of nausea not specified  HTN (hypertension): HTN (hypertension)  Enlarged prostate: Enlarged prostate  High cholesterol: High cholesterol  Seizure: Seizure          Case Discussed with House Staff   45 minutes spent on total encounter; more than 50% of the visit was spent counseling and/or coordinating care by the attending physician.

## 2024-08-15 NOTE — PROGRESS NOTE ADULT - ASSESSMENT
Specialty Pharmacy     Nathan  for IV and OBI scanned into media    Card: [id number]  Issued:08/12/2024  Rx GROUP: [id number]  Rx BIN: [id number]  Rx PCN: [id number]  Suf:01     Mari Huntley  Specialty Pharmacy Technician         Assessment:  78yMale patient S/P R temporal artery biopsy for possible R temporal Arteritis.    Plan:  -Continue Regular Diet  -Pain Control as needed  -Encourage ambulation and Incentive Spirometer   -Continue GI and DVT prophylaxis  -Strict Input and output monitoring  -PT/Rehab  -Pt needs to follow up with Dr. Soni for biopsy results in 1-2 weeks in his office Assessment:  78yMale patient S/P R temporal artery biopsy for possible R temporal Arteritis.    Plan:  -Continue Regular Diet  -Pain Control as needed  -Encourage ambulation and Incentive Spirometer   -Continue GI and DVT prophylaxis  -Strict Input and output monitoring  - May remove dressing 5 days post op  -PT/Rehab  -Pt needs to follow up with Dr. Soni for biopsy results as well as to address LICA findings on duplex in 2 weeks in his office [phone]      SPECTRA [id number]
